# Patient Record
Sex: MALE | Race: BLACK OR AFRICAN AMERICAN | NOT HISPANIC OR LATINO | Employment: OTHER | URBAN - METROPOLITAN AREA
[De-identification: names, ages, dates, MRNs, and addresses within clinical notes are randomized per-mention and may not be internally consistent; named-entity substitution may affect disease eponyms.]

---

## 2020-06-16 ENCOUNTER — HOSPITAL ENCOUNTER (OUTPATIENT)
Dept: URGENT CARE | Facility: CLINIC | Age: 85
Discharge: HOME | End: 2020-06-16

## 2022-05-09 ENCOUNTER — TELEPHONE (OUTPATIENT)
Dept: UROLOGY | Facility: CLINIC | Age: 87
End: 2022-05-09

## 2022-05-09 NOTE — TELEPHONE ENCOUNTER
Provider: DR. MOSHER  Caller: MADI BASURTO  Relationship to Patient: CAREGIVER  Reason for Call:   PT HAD TO RESCHEDULE APPT FOR 5/9/22 AT 1:00 W/ DR. MOSHER. MADI BASURTO CALLED TO RESCHEDULE AND IS REQUESTING THAT PT STILL SEE DR. MOSHER. RESCHEDULED APPT FOR 5/18/22 AT 1:00 W/ DR. MOSHER.

## 2022-05-13 ENCOUNTER — APPOINTMENT (OUTPATIENT)
Dept: GENERAL RADIOLOGY | Facility: HOSPITAL | Age: 87
End: 2022-05-13

## 2022-05-13 ENCOUNTER — HOSPITAL ENCOUNTER (INPATIENT)
Facility: HOSPITAL | Age: 87
LOS: 3 days | Discharge: NURSING, REHAB OR LONG-TERM CARE | End: 2022-05-17
Attending: EMERGENCY MEDICINE

## 2022-05-13 DIAGNOSIS — R26.2 DIFFICULTY WALKING: ICD-10-CM

## 2022-05-13 DIAGNOSIS — K59.00 CONSTIPATION, UNSPECIFIED CONSTIPATION TYPE: ICD-10-CM

## 2022-05-13 DIAGNOSIS — N19 UREMIA: ICD-10-CM

## 2022-05-13 DIAGNOSIS — N17.9 ACUTE KIDNEY INJURY: Primary | ICD-10-CM

## 2022-05-13 DIAGNOSIS — Z78.9 DECREASED ACTIVITIES OF DAILY LIVING (ADL): ICD-10-CM

## 2022-05-13 DIAGNOSIS — R77.8 ELEVATED TROPONIN: ICD-10-CM

## 2022-05-13 DIAGNOSIS — R33.8 ACUTE URINARY RETENTION: ICD-10-CM

## 2022-05-13 LAB
ALBUMIN SERPL-MCNC: 4.4 G/DL (ref 3.5–5.2)
ALBUMIN/GLOB SERPL: 1 G/DL
ALP SERPL-CCNC: 89 U/L (ref 39–117)
ALT SERPL W P-5'-P-CCNC: 13 U/L (ref 1–41)
ANION GAP SERPL CALCULATED.3IONS-SCNC: 18.9 MMOL/L (ref 5–15)
AST SERPL-CCNC: 17 U/L (ref 1–40)
BASOPHILS # BLD AUTO: 0.02 10*3/MM3 (ref 0–0.2)
BASOPHILS NFR BLD AUTO: 0.3 % (ref 0–1.5)
BILIRUB SERPL-MCNC: 1.1 MG/DL (ref 0–1.2)
BILIRUB UR QL STRIP: NEGATIVE
BUN SERPL-MCNC: 112 MG/DL (ref 8–23)
BUN/CREAT SERPL: 15.5 (ref 7–25)
CALCIUM SPEC-SCNC: 9.5 MG/DL (ref 8.6–10.5)
CHLORIDE SERPL-SCNC: 98 MMOL/L (ref 98–107)
CLARITY UR: CLEAR
CO2 SERPL-SCNC: 19.1 MMOL/L (ref 22–29)
COLOR UR: YELLOW
CREAT SERPL-MCNC: 7.22 MG/DL (ref 0.76–1.27)
DEPRECATED RDW RBC AUTO: 48 FL (ref 37–54)
EGFRCR SERPLBLD CKD-EPI 2021: 6.8 ML/MIN/1.73
EOSINOPHIL # BLD AUTO: 0.07 10*3/MM3 (ref 0–0.4)
EOSINOPHIL NFR BLD AUTO: 1 % (ref 0.3–6.2)
ERYTHROCYTE [DISTWIDTH] IN BLOOD BY AUTOMATED COUNT: 14.3 % (ref 12.3–15.4)
GLOBULIN UR ELPH-MCNC: 4.3 GM/DL
GLUCOSE SERPL-MCNC: 110 MG/DL (ref 65–99)
GLUCOSE UR STRIP-MCNC: NEGATIVE MG/DL
HCT VFR BLD AUTO: 32.2 % (ref 37.5–51)
HGB BLD-MCNC: 10.9 G/DL (ref 13–17.7)
HGB UR QL STRIP.AUTO: NEGATIVE
HOLD SPECIMEN: NORMAL
HOLD SPECIMEN: NORMAL
IMM GRANULOCYTES # BLD AUTO: 0.03 10*3/MM3 (ref 0–0.05)
IMM GRANULOCYTES NFR BLD AUTO: 0.4 % (ref 0–0.5)
KETONES UR QL STRIP: NEGATIVE
LEUKOCYTE ESTERASE UR QL STRIP.AUTO: NEGATIVE
LIPASE SERPL-CCNC: 75 U/L (ref 13–60)
LYMPHOCYTES # BLD AUTO: 1.45 10*3/MM3 (ref 0.7–3.1)
LYMPHOCYTES NFR BLD AUTO: 21.6 % (ref 19.6–45.3)
MCH RBC QN AUTO: 31.1 PG (ref 26.6–33)
MCHC RBC AUTO-ENTMCNC: 33.9 G/DL (ref 31.5–35.7)
MCV RBC AUTO: 91.7 FL (ref 79–97)
MONOCYTES # BLD AUTO: 1.16 10*3/MM3 (ref 0.1–0.9)
MONOCYTES NFR BLD AUTO: 17.3 % (ref 5–12)
NEUTROPHILS NFR BLD AUTO: 3.97 10*3/MM3 (ref 1.7–7)
NEUTROPHILS NFR BLD AUTO: 59.4 % (ref 42.7–76)
NITRITE UR QL STRIP: NEGATIVE
NRBC BLD AUTO-RTO: 0 /100 WBC (ref 0–0.2)
PH UR STRIP.AUTO: 5.5 [PH] (ref 5–8)
PLATELET # BLD AUTO: 167 10*3/MM3 (ref 140–450)
PMV BLD AUTO: 8.6 FL (ref 6–12)
POTASSIUM SERPL-SCNC: 4.2 MMOL/L (ref 3.5–5.2)
PROT SERPL-MCNC: 8.7 G/DL (ref 6–8.5)
PROT UR QL STRIP: NEGATIVE
RBC # BLD AUTO: 3.51 10*6/MM3 (ref 4.14–5.8)
SODIUM SERPL-SCNC: 136 MMOL/L (ref 136–145)
SP GR UR STRIP: 1.01 (ref 1–1.03)
TROPONIN T SERPL-MCNC: 0.03 NG/ML (ref 0–0.03)
UROBILINOGEN UR QL STRIP: NORMAL
WBC NRBC COR # BLD: 6.7 10*3/MM3 (ref 3.4–10.8)
WHOLE BLOOD HOLD COAG: NORMAL
WHOLE BLOOD HOLD SPECIMEN: NORMAL

## 2022-05-13 PROCEDURE — 93005 ELECTROCARDIOGRAM TRACING: CPT

## 2022-05-13 PROCEDURE — 85025 COMPLETE CBC W/AUTO DIFF WBC: CPT

## 2022-05-13 PROCEDURE — 51702 INSERT TEMP BLADDER CATH: CPT

## 2022-05-13 PROCEDURE — 80053 COMPREHEN METABOLIC PANEL: CPT

## 2022-05-13 PROCEDURE — 74022 RADEX COMPL AQT ABD SERIES: CPT

## 2022-05-13 PROCEDURE — 84484 ASSAY OF TROPONIN QUANT: CPT

## 2022-05-13 PROCEDURE — 81003 URINALYSIS AUTO W/O SCOPE: CPT | Performed by: EMERGENCY MEDICINE

## 2022-05-13 PROCEDURE — 93010 ELECTROCARDIOGRAM REPORT: CPT | Performed by: INTERNAL MEDICINE

## 2022-05-13 PROCEDURE — 83690 ASSAY OF LIPASE: CPT

## 2022-05-13 PROCEDURE — 99285 EMERGENCY DEPT VISIT HI MDM: CPT

## 2022-05-13 PROCEDURE — 93005 ELECTROCARDIOGRAM TRACING: CPT | Performed by: EMERGENCY MEDICINE

## 2022-05-13 PROCEDURE — 36415 COLL VENOUS BLD VENIPUNCTURE: CPT

## 2022-05-13 RX ORDER — FUROSEMIDE 40 MG/1
40 TABLET ORAL DAILY
Status: ON HOLD | COMMUNITY
End: 2022-05-15

## 2022-05-13 RX ORDER — NITROGLYCERIN 80 MG/1
0.4 PATCH TRANSDERMAL SEE ADMIN INSTRUCTIONS
Status: ON HOLD | COMMUNITY
End: 2022-05-15

## 2022-05-13 RX ORDER — METOPROLOL SUCCINATE 50 MG/1
25 TABLET, EXTENDED RELEASE ORAL DAILY
COMMUNITY
Start: 2022-05-03

## 2022-05-13 RX ORDER — LISINOPRIL 10 MG/1
5 TABLET ORAL EVERY 24 HOURS
Status: ON HOLD | COMMUNITY
End: 2022-05-15

## 2022-05-13 RX ORDER — GABAPENTIN 100 MG/1
100 CAPSULE ORAL EVERY 8 HOURS SCHEDULED
Status: ON HOLD | COMMUNITY
End: 2022-05-15

## 2022-05-13 RX ORDER — FEXOFENADINE HCL 180 MG/1
180 TABLET ORAL DAILY
COMMUNITY
Start: 2022-05-03

## 2022-05-13 RX ORDER — SODIUM CHLORIDE 0.9 % (FLUSH) 0.9 %
10 SYRINGE (ML) INJECTION AS NEEDED
Status: DISCONTINUED | OUTPATIENT
Start: 2022-05-13 | End: 2022-05-17 | Stop reason: HOSPADM

## 2022-05-13 RX ORDER — FEXOFENADINE HCL 180 MG/1
180 TABLET ORAL EVERY 24 HOURS
COMMUNITY
End: 2022-05-13

## 2022-05-13 RX ORDER — ESOMEPRAZOLE MAGNESIUM 40 MG/1
40 CAPSULE, DELAYED RELEASE ORAL DAILY
Status: ON HOLD | COMMUNITY
End: 2022-05-15

## 2022-05-13 RX ORDER — CLOPIDOGREL BISULFATE 75 MG/1
75 TABLET ORAL EVERY 24 HOURS
COMMUNITY

## 2022-05-14 PROBLEM — N17.9 ACUTE KIDNEY INJURY: Status: ACTIVE | Noted: 2022-05-14

## 2022-05-14 LAB
ALBUMIN SERPL-MCNC: 3.7 G/DL (ref 3.5–5.2)
ALBUMIN/GLOB SERPL: 0.9 G/DL
ALP SERPL-CCNC: 78 U/L (ref 39–117)
ALT SERPL W P-5'-P-CCNC: 10 U/L (ref 1–41)
ANION GAP SERPL CALCULATED.3IONS-SCNC: 15.8 MMOL/L (ref 5–15)
APTT PPP: 37.3 SECONDS (ref 24.2–34.2)
AST SERPL-CCNC: 17 U/L (ref 1–40)
BASOPHILS # BLD AUTO: 0.03 10*3/MM3 (ref 0–0.2)
BASOPHILS NFR BLD AUTO: 0.5 % (ref 0–1.5)
BILIRUB SERPL-MCNC: 1 MG/DL (ref 0–1.2)
BUN SERPL-MCNC: 95 MG/DL (ref 8–23)
BUN/CREAT SERPL: 17.4 (ref 7–25)
CALCIUM SPEC-SCNC: 8.9 MG/DL (ref 8.6–10.5)
CHLORIDE SERPL-SCNC: 107 MMOL/L (ref 98–107)
CO2 SERPL-SCNC: 18.2 MMOL/L (ref 22–29)
CREAT SERPL-MCNC: 5.45 MG/DL (ref 0.76–1.27)
DEPRECATED RDW RBC AUTO: 47.6 FL (ref 37–54)
EGFRCR SERPLBLD CKD-EPI 2021: 9.6 ML/MIN/1.73
EOSINOPHIL # BLD AUTO: 0.09 10*3/MM3 (ref 0–0.4)
EOSINOPHIL NFR BLD AUTO: 1.6 % (ref 0.3–6.2)
ERYTHROCYTE [DISTWIDTH] IN BLOOD BY AUTOMATED COUNT: 14.1 % (ref 12.3–15.4)
GLOBULIN UR ELPH-MCNC: 3.9 GM/DL
GLUCOSE SERPL-MCNC: 94 MG/DL (ref 65–99)
HCT VFR BLD AUTO: 32.6 % (ref 37.5–51)
HGB BLD-MCNC: 10.8 G/DL (ref 13–17.7)
IMM GRANULOCYTES # BLD AUTO: 0.02 10*3/MM3 (ref 0–0.05)
IMM GRANULOCYTES NFR BLD AUTO: 0.4 % (ref 0–0.5)
INR PPP: 1.22 (ref 0.86–1.15)
LYMPHOCYTES # BLD AUTO: 1.21 10*3/MM3 (ref 0.7–3.1)
LYMPHOCYTES NFR BLD AUTO: 21.8 % (ref 19.6–45.3)
MAGNESIUM SERPL-MCNC: 2.5 MG/DL (ref 1.6–2.4)
MCH RBC QN AUTO: 30.9 PG (ref 26.6–33)
MCHC RBC AUTO-ENTMCNC: 33.1 G/DL (ref 31.5–35.7)
MCV RBC AUTO: 93.1 FL (ref 79–97)
MONOCYTES # BLD AUTO: 0.79 10*3/MM3 (ref 0.1–0.9)
MONOCYTES NFR BLD AUTO: 14.2 % (ref 5–12)
NEUTROPHILS NFR BLD AUTO: 3.42 10*3/MM3 (ref 1.7–7)
NEUTROPHILS NFR BLD AUTO: 61.5 % (ref 42.7–76)
NRBC BLD AUTO-RTO: 0 /100 WBC (ref 0–0.2)
PLATELET # BLD AUTO: 162 10*3/MM3 (ref 140–450)
PMV BLD AUTO: 8.6 FL (ref 6–12)
POTASSIUM SERPL-SCNC: 3.9 MMOL/L (ref 3.5–5.2)
PROT SERPL-MCNC: 7.6 G/DL (ref 6–8.5)
PROTHROMBIN TIME: 15.6 SECONDS (ref 11.8–14.9)
RBC # BLD AUTO: 3.5 10*6/MM3 (ref 4.14–5.8)
SODIUM SERPL-SCNC: 141 MMOL/L (ref 136–145)
WBC NRBC COR # BLD: 5.56 10*3/MM3 (ref 3.4–10.8)

## 2022-05-14 PROCEDURE — 94799 UNLISTED PULMONARY SVC/PX: CPT

## 2022-05-14 PROCEDURE — 93010 ELECTROCARDIOGRAM REPORT: CPT | Performed by: INTERNAL MEDICINE

## 2022-05-14 PROCEDURE — 83735 ASSAY OF MAGNESIUM: CPT | Performed by: EMERGENCY MEDICINE

## 2022-05-14 PROCEDURE — 85730 THROMBOPLASTIN TIME PARTIAL: CPT | Performed by: EMERGENCY MEDICINE

## 2022-05-14 PROCEDURE — 80053 COMPREHEN METABOLIC PANEL: CPT | Performed by: INTERNAL MEDICINE

## 2022-05-14 PROCEDURE — 85025 COMPLETE CBC W/AUTO DIFF WBC: CPT | Performed by: INTERNAL MEDICINE

## 2022-05-14 PROCEDURE — 84153 ASSAY OF PSA TOTAL: CPT | Performed by: INTERNAL MEDICINE

## 2022-05-14 PROCEDURE — 93005 ELECTROCARDIOGRAM TRACING: CPT | Performed by: INTERNAL MEDICINE

## 2022-05-14 PROCEDURE — 85610 PROTHROMBIN TIME: CPT | Performed by: EMERGENCY MEDICINE

## 2022-05-14 RX ORDER — ACETAMINOPHEN 325 MG/1
650 TABLET ORAL EVERY 4 HOURS PRN
Status: DISCONTINUED | OUTPATIENT
Start: 2022-05-14 | End: 2022-05-17 | Stop reason: HOSPADM

## 2022-05-14 RX ORDER — ASPIRIN 81 MG/1
81 TABLET, CHEWABLE ORAL DAILY
Status: DISCONTINUED | OUTPATIENT
Start: 2022-05-14 | End: 2022-05-17 | Stop reason: HOSPADM

## 2022-05-14 RX ORDER — SODIUM CHLORIDE 0.9 % (FLUSH) 0.9 %
10 SYRINGE (ML) INJECTION EVERY 12 HOURS SCHEDULED
Status: DISCONTINUED | OUTPATIENT
Start: 2022-05-14 | End: 2022-05-17 | Stop reason: HOSPADM

## 2022-05-14 RX ORDER — SODIUM CHLORIDE 9 MG/ML
100 INJECTION, SOLUTION INTRAVENOUS CONTINUOUS
Status: DISCONTINUED | OUTPATIENT
Start: 2022-05-14 | End: 2022-05-14

## 2022-05-14 RX ORDER — SODIUM CHLORIDE 9 MG/ML
125 INJECTION, SOLUTION INTRAVENOUS CONTINUOUS
Status: DISCONTINUED | OUTPATIENT
Start: 2022-05-14 | End: 2022-05-17

## 2022-05-14 RX ORDER — NITROGLYCERIN 80 MG/1
1 PATCH TRANSDERMAL DAILY
Status: DISCONTINUED | OUTPATIENT
Start: 2022-05-14 | End: 2022-05-17 | Stop reason: HOSPADM

## 2022-05-14 RX ORDER — CLOPIDOGREL BISULFATE 75 MG/1
75 TABLET ORAL EVERY 24 HOURS
Status: DISCONTINUED | OUTPATIENT
Start: 2022-05-14 | End: 2022-05-17 | Stop reason: HOSPADM

## 2022-05-14 RX ORDER — METOPROLOL SUCCINATE 50 MG/1
50 TABLET, EXTENDED RELEASE ORAL DAILY
Status: DISCONTINUED | OUTPATIENT
Start: 2022-05-14 | End: 2022-05-17 | Stop reason: HOSPADM

## 2022-05-14 RX ORDER — NITROGLYCERIN 0.4 MG/1
0.4 TABLET SUBLINGUAL
Status: DISCONTINUED | OUTPATIENT
Start: 2022-05-14 | End: 2022-05-17 | Stop reason: HOSPADM

## 2022-05-14 RX ORDER — GABAPENTIN 100 MG/1
100 CAPSULE ORAL EVERY 8 HOURS SCHEDULED
Status: DISCONTINUED | OUTPATIENT
Start: 2022-05-14 | End: 2022-05-17 | Stop reason: HOSPADM

## 2022-05-14 RX ORDER — SODIUM CHLORIDE 0.9 % (FLUSH) 0.9 %
10 SYRINGE (ML) INJECTION AS NEEDED
Status: DISCONTINUED | OUTPATIENT
Start: 2022-05-14 | End: 2022-05-17 | Stop reason: HOSPADM

## 2022-05-14 RX ORDER — LISINOPRIL 5 MG/1
5 TABLET ORAL EVERY 24 HOURS
Status: DISCONTINUED | OUTPATIENT
Start: 2022-05-14 | End: 2022-05-17 | Stop reason: HOSPADM

## 2022-05-14 RX ORDER — PANTOPRAZOLE SODIUM 40 MG/1
40 TABLET, DELAYED RELEASE ORAL EVERY MORNING
Status: DISCONTINUED | OUTPATIENT
Start: 2022-05-14 | End: 2022-05-17 | Stop reason: HOSPADM

## 2022-05-14 RX ORDER — CETIRIZINE HYDROCHLORIDE 10 MG/1
10 TABLET ORAL DAILY
Status: DISCONTINUED | OUTPATIENT
Start: 2022-05-14 | End: 2022-05-17 | Stop reason: HOSPADM

## 2022-05-14 RX ADMIN — Medication 10 ML: at 21:13

## 2022-05-14 RX ADMIN — SODIUM CHLORIDE 500 ML: 9 INJECTION, SOLUTION INTRAVENOUS at 00:46

## 2022-05-14 RX ADMIN — SODIUM CHLORIDE 125 ML/HR: 9 INJECTION, SOLUTION INTRAVENOUS at 09:18

## 2022-05-14 RX ADMIN — METOPROLOL SUCCINATE 50 MG: 50 TABLET, EXTENDED RELEASE ORAL at 11:24

## 2022-05-14 RX ADMIN — GABAPENTIN 100 MG: 100 CAPSULE ORAL at 14:08

## 2022-05-14 RX ADMIN — SODIUM CHLORIDE 125 ML/HR: 9 INJECTION, SOLUTION INTRAVENOUS at 01:38

## 2022-05-14 RX ADMIN — CLOPIDOGREL BISULFATE 75 MG: 75 TABLET ORAL at 11:24

## 2022-05-14 RX ADMIN — CETIRIZINE HYDROCHLORIDE 10 MG: 10 TABLET, FILM COATED ORAL at 11:24

## 2022-05-14 RX ADMIN — Medication 10 ML: at 11:26

## 2022-05-14 RX ADMIN — ASPIRIN 81 MG CHEWABLE TABLET 81 MG: 81 TABLET CHEWABLE at 11:24

## 2022-05-14 RX ADMIN — LISINOPRIL 5 MG: 5 TABLET ORAL at 11:24

## 2022-05-14 RX ADMIN — PANTOPRAZOLE SODIUM 40 MG: 40 TABLET, DELAYED RELEASE ORAL at 11:24

## 2022-05-14 RX ADMIN — GABAPENTIN 100 MG: 100 CAPSULE ORAL at 21:13

## 2022-05-14 NOTE — H&P
[facility]   HISTORY AND PHYSICAL    Patient Name: Selvin Ferguson  : 1935  MRN: [id number]  Primary Care Physician:  Provider, No Known  Date of admission: 2022    Subjective   Subjective     Chief Complaint: Acute kidney injury    HPI:    Selvin Ferguson is a 86 y.o. male presented to emergency room with abdomen pain and swelling.  He has decreased appetite and decreased intake for 7 days and decreased urine output and decrease in bowel movements.     Review of Systems   Review of Systems   Constitutional: Positive for fatigue. Activity change: in bed.   HENT: Negative.    Eyes: Negative.    Respiratory: Negative for shortness of breath.    Cardiovascular: Positive for leg swelling.   Gastrointestinal: Positive for abdominal distention and abdominal pain.   Endocrine: Negative.    Genitourinary: Positive for decreased urine volume.   Musculoskeletal: Negative.    Skin: Negative.    Allergic/Immunologic: Negative.    Neurological: Negative.    Hematological: Negative.    Psychiatric/Behavioral: Negative.        Personal History     Past Medical History:   Diagnosis Date   • Dementia (HCC)    • Diabetes mellitus (HCC)    • Hyperlipidemia    • Hypertension    • Renal disorder        History reviewed. No pertinent surgical history.    Family History: family history is not on file. Otherwise pertinent FHx was reviewed and not pertinent to current issue.    Social History:  reports that he has never smoked. He has never used smokeless tobacco. He reports previous alcohol use. He reports that he does not use drugs.    Home Medications:  Aspirin, clopidogrel, esomeprazole, fexofenadine, furosemide, gabapentin, lisinopril, metoprolol succinate XL, and nitroglycerin      Allergies:  No Known Allergies    Objective   Objective     Vitals:   Temp:  [98 °F (36.7 °C)-98.6 °F (37 °C)] 98.1 °F (36.7 °C)  Heart Rate:  [78-85] 78  Resp:  [16-18] 16  BP: (134-148)/(54-78) 148/78  Physical  Exam    Constitutional: Awake, alert   Eyes: PERRLA, sclerae anicteric, no conjunctival injection   HENT: NCAT, mucous membranes moist   Neck: Supple, no thyromegaly, no lymphadenopathy, trachea midline   Respiratory: Clear to auscultation bilaterally, nonlabored respirations    Cardiovascular: RRR, no murmurs, rubs, or gallops, palpable pedal pulses bilaterally   Gastrointestinal: Positive bowel sounds, soft, nontender, nondistended   Musculoskeletal: No bilateral ankle edema, no clubbing or cyanosis to extremities   Psychiatric: Appropriate affect, cooperative   Neurologic: Oriented x 3, strength symmetric in all extremities, Cranial Nerves grossly intact to confrontation, speech clear   Skin: No rashes     Result Review    Result Review:  I have personally reviewed the results from the time of this admission to 5/14/2022 01:21 EDT and agree with these findings:  [x]  Laboratory  []  Microbiology  []  Radiology  []  EKG/Telemetry   []  Cardiology/Vascular   []  Pathology  []  Old records  []  Other:  Most notable findings include: Very elevated BUN/creatinine    Assessment & Plan   Assessment / Plan     Brief Patient Summary:  Selvin Ferguson is a 86 y.o. male who had abdominal x-ray showing large amount of stool burden    Active Hospital Problems:  Active Hospital Problems    Diagnosis    • Acute kidney injury (HCC)      Plan:   BUN/creatinine very high   Abdomen x-ray showing large stool burden  Admitted to monitored floor  Started on IV fluids   DVT prophylaxis:  No DVT prophylaxis order currently exists.    CODE STATUS:    Level Of Support Discussed With: Patient  Code Status (Patient has no pulse and is not breathing): CPR (Attempt to Resuscitate)  Medical Interventions (Patient has pulse or is breathing): Full Support    Admission Status:  I believe this patient meets in patient status.    Electronically signed by Ynes Kc MD, 05/14/22, 1:21 AM EDT.

## 2022-05-14 NOTE — PROGRESS NOTES
[facility]     Progress Note    Patient Name: Selvin Ferguson  : 1935  MRN: [id number]  Primary Care Physician:  Provider, No Known  Date of admission: 2022    Subjective   Subjective     Chief Complaint: CARMEL, constipation    HPI:  Patient Reports he feels like he had have a bowel movement now.  Agustin is putting out urine    Review of Systems   Review of Systems   Constitutional: Positive for fatigue. Activity change: in bed.   HENT: Negative.    Eyes: Negative.    Respiratory: Negative.    Cardiovascular: Negative.    Gastrointestinal: Positive for abdominal distention.   Endocrine: Negative.    Genitourinary: Negative.    Musculoskeletal: Negative.    Skin: Negative.    Allergic/Immunologic: Negative.    Neurological: Positive for weakness.   Hematological: Negative.    Psychiatric/Behavioral: Negative.        Objective   Objective     Vitals:   Temp:  [97.7 °F (36.5 °C)-98.6 °F (37 °C)] 97.7 °F (36.5 °C)  Heart Rate:  [73-90] 90  Resp:  [16-18] 18  BP: (117-148)/(54-78) 117/55  Physical Exam    Constitutional: Awake, alert   Eyes: PERRLA, sclerae anicteric, no conjunctival injection   HENT: NCAT, mucous membranes moist   Neck: Supple, no thyromegaly, no lymphadenopathy, trachea midline   Respiratory: Clear to auscultation bilaterally, nonlabored respirations    Cardiovascular: RRR, no murmurs, rubs, or gallops, palpable pedal pulses bilaterally   Gastrointestinal: Positive bowel sounds, soft, nontender, nondistended   Musculoskeletal: No bilateral ankle edema, no clubbing or cyanosis to extremities   Psychiatric: Appropriate affect, cooperative   Neurologic: Oriented x 3, strength symmetric in all extremities, Cranial Nerves grossly intact to confrontation, speech clear   Skin: No rashes     Result Review    Result Review:  I have personally reviewed the results from the time of this admission to 2022 11:58 EDT and agree with these findings:  [x]  Laboratory  []  Microbiology  []   Radiology  []  EKG/Telemetry   []  Cardiology/Vascular   []  Pathology  []  Old records  []  Other:  Most notable findings include: BUN/creatinine improving with IV fluids  Assessment & Plan   Assessment / Plan     Brief Patient Summary:  Selvin Ferguson is a 86 y.o. male who has not been eating or drinking much and came in severely dehydrated    Active Hospital Problems:  Active Hospital Problems    Diagnosis    • Acute kidney injury (HCC)      Plan:   Continue IV fluids labs in a.m.    DVT prophylaxis:  No DVT prophylaxis order currently exists.    CODE STATUS:   Level Of Support Discussed With: Patient  Code Status (Patient has no pulse and is not breathing): CPR (Attempt to Resuscitate)  Medical Interventions (Patient has pulse or is breathing): Full Support      Electronically signed by Ynes Kc MD, 05/14/22, 11:58 AM EDT.

## 2022-05-14 NOTE — ED PROVIDER NOTES
Time: 10:39 PM EDT  Arrived by: private car, escorted by significant other  Chief Complaint: ABDOMINAL PAIN  History provided by: Pt and Significant Other  History is limited by: N/A     History of Present Illness:  Patient is a 86 y.o. year old male that presents to the emergency department with ABDOMINAL PAIN and swelling in his feet and legs. His significant other reports that the pt has had severely decreased fluid intake and decreased appetite for the past 8 days, as well as decreased urine output and a decrease in bowel movements. The pt complains that his abdominal pain is moderate but constant and that his stomach is distended. His significant other states that the abdominal pain has increased gradually as has the abdominal distension since his symptoms initially started.   He also complains of swelling in his legs and feet that has been gradually worsening. Pt has a rasping cough that has been constant for the past 2 weeks but denies any SOB or emesis. He does not report having done anything that improved or worsened his symptoms, apart from his abdominal pain worsening with movement.     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     Pt denies any hx of cancer or a blood disorder.    Pt is not on dialysis currently.    HPI    Similar Symptoms Previously: Pt reports that his cough has been present for 2 weeks and his significant other claims that he has been exhibiting decreased fluid intake and appetite as well as decreased urine output for the past 8 days.   Recently seen: Not recently seen in this ED      Patient Care Team  Primary Care Provider: Kayla Renee APRN    Past Medical History:     No Known Allergies  Past Medical History:   Diagnosis Date   • Dementia (HCC)    • Diabetes mellitus (HCC)    • Hyperlipidemia    • Hypertension    • Renal disorder      History reviewed. No pertinent surgical history.  History reviewed. No pertinent family history.    Home  "Medications:  Prior to Admission medications    Not on File        Social History:   Social History     Tobacco Use   • Smoking status: Never Smoker   • Smokeless tobacco: Never Used   Substance Use Topics   • Alcohol use: Not Currently   • Drug use: Never         Review of Systems:  Review of Systems   Constitutional: Positive for appetite change. Negative for chills, diaphoresis and fever.   HENT: Negative for congestion, postnasal drip, rhinorrhea and sore throat.    Eyes: Negative for photophobia.   Respiratory: Positive for cough (cough started 2 weeks ago). Negative for chest tightness and shortness of breath.    Cardiovascular: Positive for leg swelling. Negative for chest pain and palpitations.   Gastrointestinal: Positive for abdominal distention, abdominal pain and constipation. Negative for diarrhea, nausea and vomiting.   Genitourinary: Positive for decreased urine volume and difficulty urinating. Negative for dysuria, hematuria and urgency.   Musculoskeletal: Negative for neck pain and neck stiffness.   Skin: Negative for pallor and rash.   Neurological: Negative for dizziness, syncope, weakness, numbness and headaches.   Hematological: Negative for adenopathy. Does not bruise/bleed easily.   Psychiatric/Behavioral: Negative.         Physical Exam:  /48 (BP Location: Left arm, Patient Position: Lying)   Pulse 76   Temp 98.6 °F (37 °C) (Oral)   Resp 18   Ht 180.3 cm (70.98\")   Wt 80.6 kg (177 lb 11.1 oz)   SpO2 100%   BMI 24.79 kg/m²     Physical Exam  Vitals and nursing note reviewed.   Constitutional:       General: He is not in acute distress.     Appearance: Normal appearance. He is not ill-appearing, toxic-appearing or diaphoretic.   HENT:      Head: Normocephalic and atraumatic.      Mouth/Throat:      Mouth: Mucous membranes are moist.   Eyes:      Pupils: Pupils are equal, round, and reactive to light.   Cardiovascular:      Rate and Rhythm: Normal rate and regular rhythm.      " Pulses: Normal pulses.           Carotid pulses are 2+ on the right side and 2+ on the left side.       Radial pulses are 2+ on the right side and 2+ on the left side.        Femoral pulses are 2+ on the right side and 2+ on the left side.       Popliteal pulses are 2+ on the right side and 2+ on the left side.        Dorsalis pedis pulses are 2+ on the right side and 2+ on the left side.        Posterior tibial pulses are 2+ on the right side and 2+ on the left side.      Heart sounds: Normal heart sounds. No murmur heard.  Pulmonary:      Effort: Pulmonary effort is normal. No accessory muscle usage, respiratory distress or retractions.      Breath sounds: Examination of the right-lower field reveals rales. Examination of the left-lower field reveals rales. Decreased breath sounds and rales present. No wheezing or rhonchi.   Chest:      Comments: No prominent Aortic Pulsation  Abdominal:      General: There is distension.      Palpations: Abdomen is soft. There is no mass.      Tenderness: There is no abdominal tenderness. There is no right CVA tenderness, left CVA tenderness, guarding or rebound.      Comments: No rigidity   Musculoskeletal:         General: No swelling, tenderness or deformity.      Cervical back: Neck supple. No tenderness.      Right lower leg: Edema (feet, ankles, lower legs) present.      Left lower leg: Edema (feet, ankles, lower legs) present.   Skin:     General: Skin is warm and dry.      Capillary Refill: Capillary refill takes less than 2 seconds.      Coloration: Skin is not jaundiced or pale.      Findings: No erythema.      Comments: Pt has a thoracotomy scar mid chest   Neurological:      General: No focal deficit present.      Mental Status: He is alert and oriented to person, place, and time. Mental status is at baseline.      Sensory: No sensory deficit.      Motor: No weakness.   Psychiatric:         Mood and Affect: Mood normal.         Behavior: Behavior normal.                 Medications in the Emergency Department:  Medications   sodium chloride 0.9 % bolus 500 mL (0 mL Intravenous Stopped 5/14/22 0133)        Labs  Lab Results (last 24 hours)     Procedure Component Value Units Date/Time    Basic Metabolic Panel [[id number]]  (Abnormal) Collected: 05/19/22 0400    Specimen: Blood Updated: 05/19/22 0502     Glucose 149 mg/dL      BUN 22 mg/dL      Creatinine 2.15 mg/dL      Sodium 145 mmol/L      Potassium 3.3 mmol/L      Chloride 110 mmol/L      CO2 23.8 mmol/L      Calcium 8.4 mg/dL      BUN/Creatinine Ratio 10.2     Anion Gap 11.2 mmol/L      eGFR 29.3 mL/min/1.73      Comment: National Kidney Foundation and American Society of Nephrology (ASN) Task Force recommended calculation based on the Chronic Kidney Disease Epidemiology Collaboration (CKD-EPI) equation refit without adjustment for race.       Narrative:      GFR Normal >60  Chronic Kidney Disease <60  Kidney Failure <15      Magnesium [[id number]]  (Normal) Collected: 05/19/22 0400    Specimen: Blood Updated: 05/19/22 0516     Magnesium 1.7 mg/dL            Imaging:  No Radiology Exams Resulted Within Past 24 Hours    Procedures:  Procedures    Progress  ED Course as of 05/20/22 0150   Fri May 13, 2022   2303 EKG:    Rhythm: Sinus rhythm  Rate: 84  LVH by criteria in aVL  Intervals: Normal SC and QT interval  T-wave: Nonspecific isolated T wave inversion III, nonspecific T wave inversion in aVF  ST Segment: J-point elevation in nonspecific ST segment in V2, V3, no pathological ST elevation or ST depression    EKG Comparison: The QRS and ST morphology are probably unchanged from EKG performed June 16,020    Interpreted by me   [SD]   2348 Agustin catheter was placed and approximately 2 L was achieved from the bladder [SD]      ED Course User Index  [SD] Piyush Luis DO                            Medical Decision Making:  MDM  Number of Diagnoses or Management Options  Acute kidney injury (HCC)  Elevated  troponin  Uremia  Diagnosis management comments:     A Agustin catheter was placed.  2 L of urine was obtained from the bladder.  The patient's abdominal distention was significantly improved.  The abdomen was reexamined.  The patient had no abdominal tenderness.  X-ray does also show mistreat possible constipation.  The patient will be given a soapsuds enema.       Amount and/or Complexity of Data Reviewed  Clinical lab tests: reviewed  Tests in the radiology section of CPT®: reviewed  Tests in the medicine section of CPT®: reviewed  Decide to obtain previous medical records or to obtain history from someone other than the patient: yes  Discuss the patient with other providers: yes (Discussed case with Dr. Mcmahon.  She will admit the patient to the hospital due to the acute kidney injury and acute urinary retention.  The patient will undergo further evaluation acute kidney injury and urinary retention upon admission)                 Final diagnoses:   Acute kidney injury (HCC)   Uremia   Elevated troponin   Constipation, unspecified constipation type   Acute urinary retention        Disposition:  ED Disposition     ED Disposition   Decision to Admit    Condition   --    Comment   Level of Care: Telemetry [5]   Diagnosis: Acute kidney injury (HCC) [[id number]]   Admitting Physician: BRODY MCMAHON [[id number]]   Attending Physician: BRODY MCMAHON [[id number]]   Isolate for COVID?: No [0]   Certification: I Certify That Inpatient Hospital Services Are Medically Necessary For Greater Than 2 Midnights               Documentation assistance provided by Lanette Bardales acting as scribe for Piyush Luis DO. Information recorded by the scribe was done at my direction and has been verified and validated by me.               Lanette Bardales  05/13/22 2963       Piyush Luis DO  05/20/22 0151

## 2022-05-14 NOTE — CONSULTS
"Nutrition Services    Patient Name: Selvin Ferguson  YOB: 1935  MRN: [id number]  Admission date: 5/13/2022      CLINICAL NUTRITION ASSESSMENT      Reason for Assessment  MST score 2+     H&P:    Past Medical History:   Diagnosis Date   • Dementia (HCC)    • Diabetes mellitus (HCC)    • Hyperlipidemia    • Hypertension    • Renal disorder         Current Problems:   Active Hospital Problems    Diagnosis    • Acute kidney injury (HCC)         Nutrition/Diet History         Narrative     RD following patient for unsure of weight loss at time of admission. Patient is currently drowsy/not oriented. Patient is currently NPO. No previous weight history on file.        Anthropometrics        Current Height, Weight Height: 180.3 cm (70.98\")  Weight: 88.8 kg (195 lb 12.3 oz)   Current BMI Body mass index is 27.32 kg/m².       Weight Hx  Wt Readings from Last 30 Encounters:   05/14/22 0209 88.8 kg (195 lb 12.3 oz)   05/13/22 2303 88.8 kg (195 lb 12.3 oz)            Wt Change Observation No previous weight hx     Estimated/Assessed Needs       Energy Requirements    EST Needs (kcal/day) 4955-5143 kcal/d       Protein Requirements    EST Daily Needs (g/day)  gm/d       Fluid Requirements     Estimated Needs (mL/day) 8176-8869 ml/d     Labs/Medications         Pertinent Labs Reviewed.   Results from last 7 days   Lab Units 05/13/22  1719   SODIUM mmol/L 136   POTASSIUM mmol/L 4.2   CHLORIDE mmol/L 98   CO2 mmol/L 19.1*   BUN mg/dL 112*   CREATININE mg/dL 7.22*   CALCIUM mg/dL 9.5   BILIRUBIN mg/dL 1.1   ALK PHOS U/L 89   ALT (SGPT) U/L 13   AST (SGOT) U/L 17   GLUCOSE mg/dL 110*     Results from last 7 days   Lab Units 05/14/22  0026 05/13/22  1719   MAGNESIUM mg/dL 2.5*  --    HEMOGLOBIN g/dL  --  10.9*   HEMATOCRIT %  --  32.2*     No results found for: COVID19  No results found for: HGBA1C      Pertinent Medications Reviewed.     Current Nutrition Orders & Evaluation of Intake       Oral Nutrition   "   Current PO Diet NPO Diet NPO Type: Strict NPO   Supplement No active supplement orders       Nutrition Diagnosis         Nutrition Dx Problem 1 Inadequate nutrient intake related to inadequate oral Intake as evidenced by NPO       Nutrition Intervention         May need to consider SLP evaluation prior to diet advancement.   Advance diet as medically able  RD will follow up to assess diet advancement and PO intake     Monitor/Evaluation        Monitor Per protocol, Pertinent labs, Weight, Skin status, GI status, Swallow function, Diet advancement       Nutrition Discharge Plan         To be determined       Electronically signed by:  Era Lozano RD  05/14/22 08:11 EDT

## 2022-05-14 NOTE — PLAN OF CARE
Goal Outcome Evaluation:  Plan of Care Reviewed With: patient        Progress: no change  Outcome Evaluation: Pt admitted tonight with CARMEL. NS infusing at 125/ml. Soap suds enema given for fecal impaction without results. Turning q2h. Pt drowsy and not completely oriented. Agustin catheter in place due to retention and possible obstruction. Mild edema noted in lower legs. Wife to bring in medication list to finish admission.

## 2022-05-15 LAB
ALBUMIN SERPL-MCNC: 3.6 G/DL (ref 3.5–5.2)
ALBUMIN/GLOB SERPL: 0.9 G/DL
ALP SERPL-CCNC: 77 U/L (ref 39–117)
ALT SERPL W P-5'-P-CCNC: 10 U/L (ref 1–41)
ANION GAP SERPL CALCULATED.3IONS-SCNC: 11.8 MMOL/L (ref 5–15)
AST SERPL-CCNC: 16 U/L (ref 1–40)
BASOPHILS # BLD AUTO: 0.04 10*3/MM3 (ref 0–0.2)
BASOPHILS NFR BLD AUTO: 0.6 % (ref 0–1.5)
BILIRUB SERPL-MCNC: 0.9 MG/DL (ref 0–1.2)
BUN SERPL-MCNC: 64 MG/DL (ref 8–23)
BUN/CREAT SERPL: 18.9 (ref 7–25)
CALCIUM SPEC-SCNC: 8.9 MG/DL (ref 8.6–10.5)
CHLORIDE SERPL-SCNC: 111 MMOL/L (ref 98–107)
CO2 SERPL-SCNC: 19.2 MMOL/L (ref 22–29)
CREAT SERPL-MCNC: 3.39 MG/DL (ref 0.76–1.27)
DEPRECATED RDW RBC AUTO: 49.5 FL (ref 37–54)
EGFRCR SERPLBLD CKD-EPI 2021: 16.9 ML/MIN/1.73
EOSINOPHIL # BLD AUTO: 0.12 10*3/MM3 (ref 0–0.4)
EOSINOPHIL NFR BLD AUTO: 1.8 % (ref 0.3–6.2)
ERYTHROCYTE [DISTWIDTH] IN BLOOD BY AUTOMATED COUNT: 14.1 % (ref 12.3–15.4)
GLOBULIN UR ELPH-MCNC: 4 GM/DL
GLUCOSE SERPL-MCNC: 175 MG/DL (ref 65–99)
HCT VFR BLD AUTO: 30.2 % (ref 37.5–51)
HGB BLD-MCNC: 9.9 G/DL (ref 13–17.7)
IMM GRANULOCYTES # BLD AUTO: 0.02 10*3/MM3 (ref 0–0.05)
IMM GRANULOCYTES NFR BLD AUTO: 0.3 % (ref 0–0.5)
LYMPHOCYTES # BLD AUTO: 1.74 10*3/MM3 (ref 0.7–3.1)
LYMPHOCYTES NFR BLD AUTO: 25.8 % (ref 19.6–45.3)
MCH RBC QN AUTO: 31.1 PG (ref 26.6–33)
MCHC RBC AUTO-ENTMCNC: 32.8 G/DL (ref 31.5–35.7)
MCV RBC AUTO: 95 FL (ref 79–97)
MONOCYTES # BLD AUTO: 0.86 10*3/MM3 (ref 0.1–0.9)
MONOCYTES NFR BLD AUTO: 12.7 % (ref 5–12)
NEUTROPHILS NFR BLD AUTO: 3.97 10*3/MM3 (ref 1.7–7)
NEUTROPHILS NFR BLD AUTO: 58.8 % (ref 42.7–76)
NRBC BLD AUTO-RTO: 0 /100 WBC (ref 0–0.2)
PLATELET # BLD AUTO: 178 10*3/MM3 (ref 140–450)
PMV BLD AUTO: 9.6 FL (ref 6–12)
POTASSIUM SERPL-SCNC: 3.6 MMOL/L (ref 3.5–5.2)
PROT SERPL-MCNC: 7.6 G/DL (ref 6–8.5)
PSA SERPL-MCNC: 3.09 NG/ML (ref 0–4)
RBC # BLD AUTO: 3.18 10*6/MM3 (ref 4.14–5.8)
RBC MORPH BLD: NORMAL
SMALL PLATELETS BLD QL SMEAR: ADEQUATE
SODIUM SERPL-SCNC: 142 MMOL/L (ref 136–145)
WBC MORPH BLD: NORMAL
WBC NRBC COR # BLD: 6.75 10*3/MM3 (ref 3.4–10.8)

## 2022-05-15 PROCEDURE — 80053 COMPREHEN METABOLIC PANEL: CPT | Performed by: INTERNAL MEDICINE

## 2022-05-15 PROCEDURE — 86334 IMMUNOFIX E-PHORESIS SERUM: CPT | Performed by: INTERNAL MEDICINE

## 2022-05-15 PROCEDURE — 85007 BL SMEAR W/DIFF WBC COUNT: CPT | Performed by: INTERNAL MEDICINE

## 2022-05-15 PROCEDURE — 94799 UNLISTED PULMONARY SVC/PX: CPT

## 2022-05-15 PROCEDURE — 84165 PROTEIN E-PHORESIS SERUM: CPT | Performed by: INTERNAL MEDICINE

## 2022-05-15 PROCEDURE — 82784 ASSAY IGA/IGD/IGG/IGM EACH: CPT | Performed by: INTERNAL MEDICINE

## 2022-05-15 PROCEDURE — 85025 COMPLETE CBC W/AUTO DIFF WBC: CPT | Performed by: INTERNAL MEDICINE

## 2022-05-15 RX ORDER — LISINOPRIL 5 MG/1
5 TABLET ORAL DAILY
COMMUNITY
Start: 2022-05-03 | End: 2022-05-17 | Stop reason: HOSPADM

## 2022-05-15 RX ORDER — GLIPIZIDE 2.5 MG/1
2.5 TABLET, EXTENDED RELEASE ORAL DAILY
COMMUNITY

## 2022-05-15 RX ORDER — FLUTICASONE PROPIONATE 50 MCG
2 SPRAY, SUSPENSION (ML) NASAL DAILY PRN
COMMUNITY

## 2022-05-15 RX ORDER — POLYETHYLENE GLYCOL 3350 17 G/17G
17 POWDER, FOR SOLUTION ORAL DAILY
Status: DISCONTINUED | OUTPATIENT
Start: 2022-05-15 | End: 2022-05-17 | Stop reason: HOSPADM

## 2022-05-15 RX ORDER — ROSUVASTATIN CALCIUM 40 MG/1
40 TABLET, COATED ORAL DAILY
COMMUNITY
End: 2022-05-25

## 2022-05-15 RX ORDER — PANTOPRAZOLE SODIUM 40 MG/1
40 TABLET, DELAYED RELEASE ORAL EVERY 24 HOURS
COMMUNITY

## 2022-05-15 RX ORDER — POTASSIUM CHLORIDE 20 MEQ/1
20 TABLET, EXTENDED RELEASE ORAL DAILY
COMMUNITY
Start: 2022-05-03

## 2022-05-15 RX ORDER — NITROGLYCERIN 0.4 MG/1
0.4 TABLET SUBLINGUAL
COMMUNITY

## 2022-05-15 RX ORDER — GABAPENTIN 300 MG/1
300 CAPSULE ORAL 2 TIMES DAILY
COMMUNITY
Start: 2022-05-03

## 2022-05-15 RX ORDER — PYRAZINAMIDE 500 MG/1
1 TABLET ORAL EVERY 8 HOURS PRN
COMMUNITY

## 2022-05-15 RX ADMIN — METOPROLOL SUCCINATE 50 MG: 50 TABLET, EXTENDED RELEASE ORAL at 08:49

## 2022-05-15 RX ADMIN — Medication 10 ML: at 08:50

## 2022-05-15 RX ADMIN — ASPIRIN 81 MG CHEWABLE TABLET 81 MG: 81 TABLET CHEWABLE at 08:49

## 2022-05-15 RX ADMIN — SODIUM CHLORIDE 125 ML/HR: 9 INJECTION, SOLUTION INTRAVENOUS at 01:02

## 2022-05-15 RX ADMIN — Medication 10 ML: at 21:38

## 2022-05-15 RX ADMIN — PANTOPRAZOLE SODIUM 40 MG: 40 TABLET, DELAYED RELEASE ORAL at 06:01

## 2022-05-15 RX ADMIN — GABAPENTIN 100 MG: 100 CAPSULE ORAL at 06:01

## 2022-05-15 RX ADMIN — SODIUM CHLORIDE 125 ML/HR: 9 INJECTION, SOLUTION INTRAVENOUS at 20:38

## 2022-05-15 RX ADMIN — CLOPIDOGREL BISULFATE 75 MG: 75 TABLET ORAL at 10:34

## 2022-05-15 RX ADMIN — CETIRIZINE HYDROCHLORIDE 10 MG: 10 TABLET, FILM COATED ORAL at 08:49

## 2022-05-15 RX ADMIN — SODIUM CHLORIDE 125 ML/HR: 9 INJECTION, SOLUTION INTRAVENOUS at 08:50

## 2022-05-15 RX ADMIN — GABAPENTIN 100 MG: 100 CAPSULE ORAL at 21:38

## 2022-05-15 RX ADMIN — GABAPENTIN 100 MG: 100 CAPSULE ORAL at 15:44

## 2022-05-15 RX ADMIN — POLYETHYLENE GLYCOL 3350 17 G: 17 POWDER, FOR SOLUTION ORAL at 08:49

## 2022-05-15 RX ADMIN — LISINOPRIL 5 MG: 5 TABLET ORAL at 10:34

## 2022-05-15 NOTE — PLAN OF CARE
Goal Outcome Evaluation:  Plan of Care Reviewed With: patient        Progress: improving     Family meeting arranged. Case management on care team. Pt ambulated with a walker and x1 assist to bathroom several times throughout shift.

## 2022-05-15 NOTE — PROGRESS NOTES
[facility]     Progress Note    Patient Name: Selvin Ferguson  : 1935  MRN: [id number]  Primary Care Physician:  Provider, No Known  Date of admission: 2022    Subjective   Subjective     Chief Complaint: CARMEL    HPI:  Patient Reports he is doing well and would like to walk to the bathroom to have a BM    Review of Systems   Review of Systems   Constitutional: Activity change: able to walk    HENT: Negative.    Eyes: Negative.    Respiratory: Negative.    Cardiovascular: Negative.    Gastrointestinal: Positive for constipation.   Endocrine: Negative.    Genitourinary: Negative.    Musculoskeletal: Negative.    Skin: Negative.    Allergic/Immunologic: Negative.    Neurological: Positive for weakness.   Hematological: Negative.    Psychiatric/Behavioral: Negative.        Objective   Objective     Vitals:   Temp:  [97.5 °F (36.4 °C)-98.3 °F (36.8 °C)] 98.3 °F (36.8 °C)  Heart Rate:  [73-88] 73  Resp:  [18-19] 18  BP: (110-138)/(50-65) 124/60  Physical Exam    Constitutional: Awake, alert   Eyes: PERRLA, sclerae anicteric, no conjunctival injection   HENT: NCAT, mucous membranes moist   Neck: Supple, no thyromegaly, no lymphadenopathy, trachea midline   Respiratory: Clear to auscultation bilaterally, nonlabored respirations    Cardiovascular: RRR, no murmurs, rubs, or gallops, palpable pedal pulses bilaterally   Gastrointestinal: Positive bowel sounds, soft, nontender, nondistended   Musculoskeletal: No bilateral ankle edema, no clubbing or cyanosis to extremities   Psychiatric: Appropriate affect, cooperative   Neurologic: Oriented x 3, strength symmetric in all extremities, Cranial Nerves grossly intact to confrontation, speech clear   Skin: No rashes     Result Review    Result Review:  I have personally reviewed the results from the time of this admission to 5/15/2022 10:56 EDT and agree with these findings:  []  Laboratory  []  Microbiology  []  Radiology  []  EKG/Telemetry   []   Cardiology/Vascular   []  Pathology  []  Old records  []  Other:  Most notable findings include: Renal function continues to improve BUN 64 creatinine 3.39 today    Assessment & Plan   Assessment / Plan     Brief Patient Summary:  Selvin Ferguson is a 86 y.o. male who has not been eating and drinking at home and came in very dehydrated    Active Hospital Problems:  Active Hospital Problems    Diagnosis    • Acute kidney injury (HCC)      Plan:   Continue IV fluids labs in a.m.    DVT prophylaxis:  No DVT prophylaxis order currently exists.    CODE STATUS:   Level Of Support Discussed With: Patient  Code Status (Patient has no pulse and is not breathing): CPR (Attempt to Resuscitate)  Medical Interventions (Patient has pulse or is breathing): Full Support      Electronically signed by Ynes Kc MD, 05/15/22, 10:56 AM EDT.

## 2022-05-15 NOTE — CASE MANAGEMENT/SOCIAL WORK
"Discharge Planning Assessment  [facility]     Patient Name: Selvin Ferguson  MRN: [id number]  Today's Date: 5/15/2022    Admit Date: 5/13/2022     Discharge Needs Assessment     Row Name 05/15/22 1509       Living Environment    People in Home alone    Current Living Arrangements home    Duration at Residence owns house, lives there since 1980    Primary Care Provided by self    Provides Primary Care For no one    Family Caregiver if Needed child(meera), adult    Family Caregiver Names both children are available if needed,  Kendra, patient's girlfriend, is also available if needed.    Quality of Family Relationships helpful;involved;supportive    Able to Return to Prior Arrangements yes       Resource/Environmental Concerns    Resource/Environmental Concerns none    Transportation Concerns none  patient drives a car       Transition Planning    Patient/Family Anticipates Transition to home  patient declines inpatient rehab.    Transportation Anticipated family or friend will provide       Discharge Needs Assessment    Readmission Within the Last 30 Days no previous admission in last 30 days    Equipment Currently Used at Home glucometer;walker, standard  has walker but does not need to use.    Concerns to be Addressed discharge planning    Anticipated Changes Related to Illness none    Equipment Needed After Discharge none               Discharge Plan     Row Name 05/15/22 4317       Plan    Plan Assessment done at bedside with patient and Kendra Cleveland.  CM role explained and discharge planning discussed. Face sheet, PCP and Pharmacy verified and updated. Patient PCP was updated to CHAD Renee NP at [facility]. Patient alert and oriented and answering questions coherently. Patient is resting in bed with a chester catheter in place. Patient's SO, Kendra Cleveland who is also Healthcare Surrogate and POA was in room and contradicting patient's statements and stated that \"patient is confused and does not know what he is talking " "about.\" Ms. Cleveland stated that she wanted to leave the room during the assessment stating \"she did not want to get upset when listening to the patient's answers.\" Assessment continued with the patient only. Patient states lives alone in his house and is able to take care of his basic needs. Patient states he normally has his cell phone but Ms. Cleveland has a hold of it currently. All demographic information was updated per patient statement. Patient is an insulin dependent diabetic and states has a glucometer at home. Patient states he takes his own medication. Patient states has a good relationship with his children and they will help him if he asks. Patient has [employer] Benefits at 10% connection. Ms. Cleveland returned to the room. CM requested that we all go over the information together. Ms. Cleveland proceeded to tell the patient what he needs to do and unable to continue reviewing the assessment with Ms. Cleveland. Ms. Cleveland did state that she works six hours a week for the patient through [employer]. Per patient's nurse both the son and daughter came to visit the patient in the hospital. Per the patient's nurse she spoke with patient's niece, Santa Ferguson ([phone]) who lives in [place] and stated she can have patient live with her if needed. She currently takes care of patient's sister. Ms. Cleveland would like patient to go to rehab. Patient has been in [place] in the past. Also agreeable to [facility]. Entered order for PT/OT for assessment. SW notified of above.              Continued Care and Services - Admitted Since 5/13/2022    Coordination has not been started for this encounter.          Demographic Summary     Row Name 05/15/22 1501       General Information    Admission Type inpatient    Arrived From home;physician office - external    Referral Source admission list    Reason for Consult discharge planning    Preferred Language English       Contact Information    Permission Granted to Share Info With " family/designee;power of  for healthcare               Functional Status     Row Name 05/15/22 1502       Functional Status    Usual Activity Tolerance good    Current Activity Tolerance good       Functional Status, IADL    Medications independent;assistive equipment  patient sets up own medication in packets and takes himself    Meal Preparation independent    Housekeeping independent    Laundry independent    Shopping independent    IADL Comments lives alone, reitred cook, completely independent       Mental Status    General Appearance WDL WDL       Mental Status Summary    Recent Changes in Mental Status/Cognitive Functioning no changes       Employment/    Employment Status , previous service;retired    Employment/ Comments [employer] Benefits - 10% connects               Psychosocial    No documentation.                Abuse/Neglect    No documentation.                Legal    No documentation.                Substance Abuse    No documentation.                Patient Forms    No documentation.                   Frances Buckley

## 2022-05-15 NOTE — PLAN OF CARE
Goal Outcome Evaluation:  Plan of Care Reviewed With: patient        Progress: no change  Outcome Evaluation: No acute changes through the night. VSS. Pt attempted to have BM on BSC 3 times but no results. Pushing fluids. Still awaiting medication list for admission finish.

## 2022-05-16 ENCOUNTER — APPOINTMENT (OUTPATIENT)
Dept: ULTRASOUND IMAGING | Facility: HOSPITAL | Age: 87
End: 2022-05-16

## 2022-05-16 LAB
ALBUMIN SERPL-MCNC: 3.3 G/DL (ref 3.5–5.2)
ALBUMIN/GLOB SERPL: 1 G/DL
ALP SERPL-CCNC: 65 U/L (ref 39–117)
ALT SERPL W P-5'-P-CCNC: 8 U/L (ref 1–41)
ANION GAP SERPL CALCULATED.3IONS-SCNC: 11.8 MMOL/L (ref 5–15)
AST SERPL-CCNC: 13 U/L (ref 1–40)
BASOPHILS # BLD AUTO: 0.03 10*3/MM3 (ref 0–0.2)
BASOPHILS NFR BLD AUTO: 0.5 % (ref 0–1.5)
BILIRUB SERPL-MCNC: 0.7 MG/DL (ref 0–1.2)
BUN SERPL-MCNC: 46 MG/DL (ref 8–23)
BUN/CREAT SERPL: 17 (ref 7–25)
CALCIUM SPEC-SCNC: 8.4 MG/DL (ref 8.6–10.5)
CHLORIDE SERPL-SCNC: 114 MMOL/L (ref 98–107)
CO2 SERPL-SCNC: 19.2 MMOL/L (ref 22–29)
CREAT SERPL-MCNC: 2.71 MG/DL (ref 0.76–1.27)
DEPRECATED RDW RBC AUTO: 47.9 FL (ref 37–54)
EGFRCR SERPLBLD CKD-EPI 2021: 22.2 ML/MIN/1.73
EOSINOPHIL # BLD AUTO: 0.13 10*3/MM3 (ref 0–0.4)
EOSINOPHIL NFR BLD AUTO: 2.3 % (ref 0.3–6.2)
ERYTHROCYTE [DISTWIDTH] IN BLOOD BY AUTOMATED COUNT: 14 % (ref 12.3–15.4)
GLOBULIN UR ELPH-MCNC: 3.4 GM/DL
GLUCOSE SERPL-MCNC: 124 MG/DL (ref 65–99)
HCT VFR BLD AUTO: 27.6 % (ref 37.5–51)
HGB BLD-MCNC: 9 G/DL (ref 13–17.7)
HOLD SPECIMEN: NORMAL
IMM GRANULOCYTES # BLD AUTO: 0.03 10*3/MM3 (ref 0–0.05)
IMM GRANULOCYTES NFR BLD AUTO: 0.5 % (ref 0–0.5)
LYMPHOCYTES # BLD AUTO: 1.47 10*3/MM3 (ref 0.7–3.1)
LYMPHOCYTES NFR BLD AUTO: 26.4 % (ref 19.6–45.3)
MCH RBC QN AUTO: 30.4 PG (ref 26.6–33)
MCHC RBC AUTO-ENTMCNC: 32.6 G/DL (ref 31.5–35.7)
MCV RBC AUTO: 93.2 FL (ref 79–97)
MONOCYTES # BLD AUTO: 0.87 10*3/MM3 (ref 0.1–0.9)
MONOCYTES NFR BLD AUTO: 15.6 % (ref 5–12)
NEUTROPHILS NFR BLD AUTO: 3.03 10*3/MM3 (ref 1.7–7)
NEUTROPHILS NFR BLD AUTO: 54.7 % (ref 42.7–76)
NRBC BLD AUTO-RTO: 0 /100 WBC (ref 0–0.2)
PLATELET # BLD AUTO: 155 10*3/MM3 (ref 140–450)
PMV BLD AUTO: 9.2 FL (ref 6–12)
POTASSIUM SERPL-SCNC: 3.5 MMOL/L (ref 3.5–5.2)
PROT SERPL-MCNC: 6.7 G/DL (ref 6–8.5)
RBC # BLD AUTO: 2.96 10*6/MM3 (ref 4.14–5.8)
SODIUM SERPL-SCNC: 145 MMOL/L (ref 136–145)
WBC NRBC COR # BLD: 5.56 10*3/MM3 (ref 3.4–10.8)

## 2022-05-16 PROCEDURE — 85025 COMPLETE CBC W/AUTO DIFF WBC: CPT | Performed by: INTERNAL MEDICINE

## 2022-05-16 PROCEDURE — 80053 COMPREHEN METABOLIC PANEL: CPT | Performed by: INTERNAL MEDICINE

## 2022-05-16 PROCEDURE — 76775 US EXAM ABDO BACK WALL LIM: CPT

## 2022-05-16 PROCEDURE — 94799 UNLISTED PULMONARY SVC/PX: CPT

## 2022-05-16 PROCEDURE — 97165 OT EVAL LOW COMPLEX 30 MIN: CPT

## 2022-05-16 PROCEDURE — 97161 PT EVAL LOW COMPLEX 20 MIN: CPT

## 2022-05-16 RX ADMIN — METOPROLOL SUCCINATE 50 MG: 50 TABLET, EXTENDED RELEASE ORAL at 09:46

## 2022-05-16 RX ADMIN — POLYETHYLENE GLYCOL 3350 17 G: 17 POWDER, FOR SOLUTION ORAL at 09:46

## 2022-05-16 RX ADMIN — NITROGLYCERIN 1 PATCH: 0.4 PATCH TRANSDERMAL at 09:46

## 2022-05-16 RX ADMIN — GABAPENTIN 100 MG: 100 CAPSULE ORAL at 06:14

## 2022-05-16 RX ADMIN — Medication 10 ML: at 21:00

## 2022-05-16 RX ADMIN — PANTOPRAZOLE SODIUM 40 MG: 40 TABLET, DELAYED RELEASE ORAL at 06:14

## 2022-05-16 RX ADMIN — SODIUM CHLORIDE 125 ML/HR: 9 INJECTION, SOLUTION INTRAVENOUS at 21:04

## 2022-05-16 RX ADMIN — Medication 10 ML: at 09:48

## 2022-05-16 RX ADMIN — CLOPIDOGREL BISULFATE 75 MG: 75 TABLET ORAL at 09:46

## 2022-05-16 RX ADMIN — LISINOPRIL 5 MG: 5 TABLET ORAL at 09:46

## 2022-05-16 RX ADMIN — CETIRIZINE HYDROCHLORIDE 10 MG: 10 TABLET, FILM COATED ORAL at 09:46

## 2022-05-16 RX ADMIN — ASPIRIN 81 MG CHEWABLE TABLET 81 MG: 81 TABLET CHEWABLE at 09:46

## 2022-05-16 RX ADMIN — GABAPENTIN 100 MG: 100 CAPSULE ORAL at 14:25

## 2022-05-16 RX ADMIN — GABAPENTIN 100 MG: 100 CAPSULE ORAL at 21:00

## 2022-05-16 NOTE — PLAN OF CARE
SRIKANTH overnight. VSS. Patient safety maintained. ID band on, bed in low position, wheels locked, call light within reach. Hourly rounding.     Problem: Adult Inpatient Plan of Care  Goal: Absence of Hospital-Acquired Illness or Injury  Intervention: Identify and Manage Fall Risk  Recent Flowsheet Documentation  Taken 5/16/2022 0500 by Kaylynn Foley RN  Safety Promotion/Fall Prevention:   safety round/check completed   clutter free environment maintained  Taken 5/16/2022 0400 by Kaylynn Foley RN  Safety Promotion/Fall Prevention:   safety round/check completed   clutter free environment maintained  Taken 5/16/2022 0300 by Kaylynn Foley RN  Safety Promotion/Fall Prevention:   safety round/check completed   clutter free environment maintained   assistive device/personal items within reach  Taken 5/16/2022 0200 by Kaylynn Foley RN  Safety Promotion/Fall Prevention:   safety round/check completed   clutter free environment maintained   room organization consistent  Taken 5/16/2022 0000 by Kaylynn Foley RN  Safety Promotion/Fall Prevention:   safety round/check completed   assistive device/personal items within reach   clutter free environment maintained  Taken 5/15/2022 2200 by Kaylynn Foley RN  Safety Promotion/Fall Prevention:   safety round/check completed   clutter free environment maintained  Taken 5/15/2022 2030 by Kaylynn Foley RN  Safety Promotion/Fall Prevention:   assistive device/personal items within reach   lighting adjusted   safety round/check completed  Intervention: Prevent Skin Injury  Recent Flowsheet Documentation  Taken 5/16/2022 0200 by Kaylynn Foley RN  Body Position: position changed independently  Taken 5/16/2022 0000 by Kaylynn Foley RN  Body Position: position changed independently  Taken 5/15/2022 2200 by Kaylynn Foley RN  Body Position: position changed independently  Taken 5/15/2022 2030 by Kaylynn Foley RN  Body Position: position changed  independently  Skin Protection:   adhesive use limited   incontinence pads utilized  Intervention: Prevent and Manage VTE (Venous Thromboembolism) Risk  Recent Flowsheet Documentation  Taken 5/15/2022 2030 by Kaylynn Foley RN  Range of Motion: active ROM (range of motion) encouraged  Goal: Optimal Comfort and Wellbeing  Intervention: Provide Person-Centered Care  Recent Flowsheet Documentation  Taken 5/15/2022 2030 by Kaylynn Foley RN  Trust Relationship/Rapport:   care explained   questions encouraged   emotional support provided     Problem: Adult Inpatient Plan of Care  Goal: Absence of Hospital-Acquired Illness or Injury  Intervention: Prevent Skin Injury  Recent Flowsheet Documentation  Taken 5/16/2022 0200 by Kaylynn Foley RN  Body Position: position changed independently  Taken 5/16/2022 0000 by Kaylynn Foley RN  Body Position: position changed independently  Taken 5/15/2022 2200 by Kaylynn Foley RN  Body Position: position changed independently  Taken 5/15/2022 2030 by Kaylynn Foley RN  Body Position: position changed independently  Skin Protection:   adhesive use limited   incontinence pads utilized     Problem: Fall Injury Risk  Goal: Absence of Fall and Fall-Related Injury  Intervention: Identify and Manage Contributors  Recent Flowsheet Documentation  Taken 5/15/2022 2030 by Kaylynn oFley RN  Medication Review/Management: medications reviewed  Intervention: Promote Injury-Free Environment  Recent Flowsheet Documentation  Taken 5/16/2022 0500 by Kaylynn Foley RN  Safety Promotion/Fall Prevention:   safety round/check completed   clutter free environment maintained  Taken 5/16/2022 0400 by Kaylynn Foley RN  Safety Promotion/Fall Prevention:   safety round/check completed   clutter free environment maintained  Taken 5/16/2022 0300 by Kaylynn Foley RN  Safety Promotion/Fall Prevention:   safety round/check completed   clutter free environment maintained    assistive device/personal items within reach  Taken 5/16/2022 0200 by Kaylynn Foley, RN  Safety Promotion/Fall Prevention:   safety round/check completed   clutter free environment maintained   room organization consistent  Taken 5/16/2022 0000 by Kaylynn Foley RN  Safety Promotion/Fall Prevention:   safety round/check completed   assistive device/personal items within reach   clutter free environment maintained  Taken 5/15/2022 2200 by Kaylynn Foley RN  Safety Promotion/Fall Prevention:   safety round/check completed   clutter free environment maintained  Taken 5/15/2022 2030 by Kaylynn Foley, RN  Safety Promotion/Fall Prevention:   assistive device/personal items within reach   lighting adjusted   safety round/check completed     Problem: Hypertension Comorbidity  Goal: Blood Pressure in Desired Range  Intervention: Maintain Blood Pressure Management  Recent Flowsheet Documentation  Taken 5/15/2022 2030 by Kaylynn Foley, RN  Medication Review/Management: medications reviewed   Goal Outcome Evaluation:

## 2022-05-16 NOTE — THERAPY EVALUATION
Patient Name: Selvin Ferguson  : 1935    MRN: [id number]                              Today's Date: 2022       Admit Date: 2022    Visit Dx:     ICD-10-CM ICD-9-CM   1. Acute kidney injury (HCC)  N17.9 584.9   2. Uremia  N19 586   3. Elevated troponin  R77.8 790.6   4. Constipation, unspecified constipation type  K59.00 564.00   5. Acute urinary retention  R33.8 788.29   6. Decreased activities of daily living (ADL)  Z78.9 V49.89     Patient Active Problem List   Diagnosis   • Acute kidney injury (HCC)     Past Medical History:   Diagnosis Date   • Dementia (HCC)    • Diabetes mellitus (HCC)    • Hyperlipidemia    • Hypertension    • Renal disorder      History reviewed. No pertinent surgical history.   General Information     Row Name 22 1609          OT Time and Intention    Document Type evaluation  -ES     Mode of Treatment individual therapy;occupational therapy  -ES     Row Name 22 1609          General Information    Patient Profile Reviewed yes  -ES     Prior Level of Function independent:  Patient reports independence with B and IADLs at baseline.  No device for functional mobility, has rolling walker if needed.  Walk-in shower, standing shower completion.  Grooming stands at sink.  No home O2 use.  -ES     Existing Precautions/Restrictions fall  -ES     Row Name 22 1609          Occupational Profile    Reason for Services/Referral (Occupational Profile) Patient is 86 yr old male admitted to [facility] on 2022 with reports of abdominal pain and swelling, decreased appetite and dehydration. OT evaluation and treatment ordered d/t recent decline in ADLs/transfer ability. No previous OT services for current condition.  -ES     Row Name 22 1609          Living Environment    People in Home alone  -ES     Row Name 22 1609          Home Main Entrance    Number of Stairs, Main Entrance none  -ES     Row Name 22 1609          Stairs Within  Home, Primary    Number of Stairs, Within Home, Primary none  -ES     Row Name 05/16/22 1609          Cognition    Orientation Status (Cognition) oriented to;person;place  Patient is pleasant and cooperative. Patient oriented to person and place, provided reorientation for time and situation.  -     Row Name 05/16/22 1609          Safety Issues, Functional Mobility    Impairments Affecting Function (Mobility) balance;strength;endurance/activity tolerance;shortness of breath  -ES           User Key  (r) = Recorded By, (t) = Taken By, (c) = Cosigned By    Initials Name Provider Type    ES Martita Lee OT Occupational Therapist                 Mobility/ADL's     Row Name 05/16/22 1611          Bed Mobility    Comment, (Bed Mobility) Patient met seated in recliner at therapy arrival to room  -     Row Name 05/16/22 1611          Transfers    Transfers sit-stand transfer;toilet transfer  -ES     Sit-Stand Post (Transfers) contact guard;1 person assist  -ES     [place] Level (Toilet Transfer) moderate assist (50% patient effort);1 person assist;verbal cues  -ES     Assistive Device (Toilet Transfer) grab bars/safety frame;walker, front-wheeled  -ES     Row Name 05/16/22 1611          Sit-Stand Transfer    Assistive Device (Sit-Stand Transfers) walker, front-wheeled  -ES     Row Name 05/16/22 1611          Toilet Transfer    Type (Toilet Transfer) sit-stand;stand-sit  -     Row Name 05/16/22 1611          Functional Mobility    Functional Mobility- Ind. Level contact guard assist  -ES     Functional Mobility- Device walker, front-wheeled  -ES     Functional Mobility- Comment Patient performs functional mobility from recliner to bathroom for toileting task and returns to recliner. Patient CGA with mobility with use of rolling walker  -ES     Row Name 05/16/22 1611          Activities of Daily Living    BADL Assessment/Intervention bathing;upper body dressing;lower body  dressing;grooming;feeding;toileting  -ES     Row Name 05/16/22 1611          Bathing Assessment/Intervention    [place] Level (Bathing) bathing skills;minimum assist (75% patient effort)  -ES     Row Name 05/16/22 1611          Upper Body Dressing Assessment/Training    [place] Level (Upper Body Dressing) upper body dressing skills;set up  -ES     Row Name 05/16/22 1611          Lower Body Dressing Assessment/Training    [place] Level (Lower Body Dressing) lower body dressing skills;minimum assist (75% patient effort)  -ES     Row Name 05/16/22 1611          Grooming Assessment/Training    [place] Level (Grooming) grooming skills;set up  -ES     Row Name 05/16/22 1611          Self-Feeding Assessment/Training    [place] Level (Feeding) feeding skills;set up  -ES     [facility] Name 05/16/22 1611          Toileting Assessment/Training    [place] Level (Toileting) toileting skills;contact guard assist  -ES           User Key  (r) = Recorded By, (t) = Taken By, (c) = Cosigned By    Initials Name Provider Type    ES Martita Lee OT Occupational Therapist               Obj/Interventions     [facility] Name 05/16/22 1612          Sensory Assessment (Somatosensory)    Sensory Assessment (Somatosensory) bilateral UE  -ES     Bilateral UE Sensory Assessment general sensation;intact  -ES     [facility] Name 05/16/22 1612          Vision Assessment/Intervention    Visual Impairment/Limitations WFL;corrective lenses full-time  -ES     [facility] Name 05/16/22 1612          Range of Motion Comprehensive    General Range of Motion no range of motion deficits identified;bilateral upper extremity ROM WNL  -ES     [facility] Name 05/16/22 1612          Strength Comprehensive (MMT)    Comment, General Manual Muscle Testing (MMT) Assessment bilateral upper extremities assessed 4/5  -ES     [facility] Name 05/16/22 1612          Motor Skills    Motor Skills coordination;functional endurance  -ES     Coordination WFL;bilateral;upper extremity   -ES     Functional Endurance fair. Patient demonstrates shortness of breath with mobility to return to recliner. Decreased tolerance noted with mobility and transfers  -ES     Row Name 05/16/22 1612          Balance    Balance Assessment sitting dynamic balance;standing dynamic balance  -ES     Dynamic Sitting Balance supervision  -ES     Position, Sitting Balance unsupported  -ES     Dynamic Standing Balance contact guard  -ES     Position/Device Used, Standing Balance supported;walker, front-wheeled  -ES           User Key  (r) = Recorded By, (t) = Taken By, (c) = Cosigned By    Initials Name Provider Type    ES Martita Lee, OT Occupational Therapist               Goals/Plan     Row Name 05/16/22 1614          Transfer Goal 1 (OT)    Activity/Assistive Device (Transfer Goal 1, OT) transfers, all  -ES     [place] Level/Cues Needed (Transfer Goal 1, OT) modified independence  -ES     Time Frame (Transfer Goal 1, OT) long term goal (LTG);10 days  -ES     Row Name 05/16/22 1614          Bathing Goal 1 (OT)    Activity/Device (Bathing Goal 1, OT) bathing skills, all  -ES     [place] Level/Cues Needed (Bathing Goal 1, OT) modified independence  -ES     Time Frame (Bathing Goal 1, OT) long term goal (LTG);10 days  -ES     Row Name 05/16/22 1614          Dressing Goal 1 (OT)    Activity/Device (Dressing Goal 1, OT) dressing skills, all  -ES     [place]/Cues Needed (Dressing Goal 1, OT) independent  -ES     Time Frame (Dressing Goal 1, OT) long term goal (LTG);10 days  -ES     Row Name 05/16/22 1614          Toileting Goal 1 (OT)    Activity/Device (Toileting Goal 1, OT) toileting skills, all  -ES     [place] Level/Cues Needed (Toileting Goal 1, OT) modified independence  -ES     Time Frame (Toileting Goal 1, OT) long term goal (LTG);10 days  -ES     Row Name 05/16/22 1614          Grooming Goal 1 (OT)    Activity/Device (Grooming Goal 1, OT) grooming skills, all  -ES     [place] (Grooming Goal  1, OT) independent  -ES     Time Frame (Grooming Goal 1, OT) long term goal (LTG);10 days  -ES     Row Name 05/16/22 1614          Self-Feeding Goal 1 (OT)    Activity/Device (Self-Feeding Goal 1, OT) self-feeding skills, all  -ES     [place] Level/Cues Needed (Self-Feeding Goal 1, OT) independent  -ES     Time Frame (Self-Feeding Goal 1, OT) 10 days  -ES     Row Name 05/16/22 1614          Problem Specific Goal 1 (OT)    Problem Specific Goal 1 (OT) Patient will tolerate 5 min dynamic standing task requiring zero rest breaks for increased activity tolerance required for independent ADL routine completion at time of discharge  -ES     Time Frame (Problem Specific Goal 1, OT) long term goal (LTG);10 days  -ES     Row Name 05/16/22 1614          Therapy Assessment/Plan (OT)    Planned Therapy Interventions (OT) activity tolerance training;BADL retraining;functional balance retraining;occupation/activity based interventions;patient/caregiver education/training;strengthening exercise;transfer/mobility retraining  -ES           User Key  (r) = Recorded By, (t) = Taken By, (c) = Cosigned By    Initials Name Provider Type    ES Martita Lee, OT Occupational Therapist               Clinical Impression     Row Name 05/16/22 1613          Plan of Care Review    Plan of Care Reviewed With patient;family  -ES     Progress no change  initial evaluation encounter  -ES     Outcome Evaluation Patient has experienced decline in function from baseline status, presenting w/ deficits related to activity tolerance, functional balance, functional transfer, self care management and functional mobility that impede patient independence with activities of daily living. Patient would benefit from skilled OT intervention to maxamize patient safety, prevent further debility and promote return to optimal level of independence.  -ES     Row Name 05/16/22 1613          Therapy Assessment/Plan (OT)    Rehab Potential (OT) good, to achieve  stated therapy goals  -ES     Criteria for Skilled Therapeutic Interventions Met (OT) yes;meets criteria;skilled treatment is necessary  -ES     Therapy Frequency (OT) 5 times/wk  -ES     Row Name 05/16/22 1613          Therapy Plan Review/Discharge Plan (OT)    Equipment Needs Upon Discharge (OT) walker, rolling  -ES     Anticipated Discharge Disposition (OT) home with home health;home with 24/7 care  -ES           User Key  (r) = Recorded By, (t) = Taken By, (c) = Cosigned By    Initials Name Provider Type    Martita Garcia OT Occupational Therapist               Outcome Measures     Row Name 05/16/22 1615          How much help from another is currently needed...    Putting on and taking off regular lower body clothing? 3  -ES     Bathing (including washing, rinsing, and drying) 3  -ES     Toileting (which includes using toilet bed pan or urinal) 3  -ES     Putting on and taking off regular upper body clothing 4  -ES     Taking care of personal grooming (such as brushing teeth) 4  -ES     Eating meals 4  -ES     AM-PAC 6 Clicks Score (OT) 21  -ES     Row Name 05/16/22 1615          Functional Assessment    Outcome Measure Options AM-PAC 6 Clicks Daily Activity (OT);Optimal Instrument  -ES     Row Name 05/16/22 1615          Optimal Instrument    Optimal Instrument Optimal - 3  -ES     Bending/Stooping 2  -ES     Standing 2  -ES     Reaching 1  -ES     From the list, choose the 3 activities you would most like to be able to do without any difficulty Bending/stooping;Standing;Reaching  -ES     Total Score Optimal - 3 5  -ES           User Key  (r) = Recorded By, (t) = Taken By, (c) = Cosigned By    Initials Name Provider Type    Martita Garcia OT Occupational Therapist                Occupational Therapy Education                 Title: PT OT SLP Therapies (Done)     Topic: Occupational Therapy (Done)     Point: ADL training (Done)     Description:   Instruct learner(s) on proper safety adaptation and  remediation techniques during self care or transfers.   Instruct in proper use of assistive devices.              Learning Progress Summary           Patient Acceptance, E,TB, VU by  at 5/16/2022 1616                   Point: Home exercise program (Done)     Description:   Instruct learner(s) on appropriate technique for monitoring, assisting and/or progressing therapeutic exercises/activities.              Learning Progress Summary           Patient Acceptance, E,TB, VU by  at 5/16/2022 1616                   Point: Precautions (Done)     Description:   Instruct learner(s) on prescribed precautions during self-care and functional transfers.              Learning Progress Summary           Patient Acceptance, E,TB, VU by  at 5/16/2022 1616                   Point: Body mechanics (Done)     Description:   Instruct learner(s) on proper positioning and spine alignment during self-care, functional mobility activities and/or exercises.              Learning Progress Summary           Patient Acceptance, E,TB, VU by  at 5/16/2022 1616                               User Key     Initials Effective Dates Name Provider Type Discipline     09/13/21 -  Martita Lee OT Occupational Therapist OT              OT Recommendation and Plan  Planned Therapy Interventions (OT): activity tolerance training, BADL retraining, functional balance retraining, occupation/activity based interventions, patient/caregiver education/training, strengthening exercise, transfer/mobility retraining  Therapy Frequency (OT): 5 times/wk  Plan of Care Review  Plan of Care Reviewed With: patient, family  Progress: no change (initial evaluation encounter)  Outcome Evaluation: Patient has experienced decline in function from baseline status, presenting w/ deficits related to activity tolerance, functional balance, functional transfer, self care management and functional mobility that impede patient independence with activities of daily living.  Patient would benefit from skilled OT intervention to maxamize patient safety, prevent further debility and promote return to optimal level of independence.     Time Calculation:    Time Calculation- OT     Row Name 05/16/22 1616             Time Calculation- OT    OT Received On 05/16/22  -ES      OT Goal Re-Cert Due Date 05/25/22  -ES              Untimed Charges    OT Eval/Re-eval Minutes 32  -ES              Total Minutes    Untimed Charges Total Minutes 32  -ES       Total Minutes 32  -ES            User Key  (r) = Recorded By, (t) = Taken By, (c) = Cosigned By    Initials Name Provider Type    ES Martita Lee OT Occupational Therapist              Therapy Charges for Today     Code Description Service Date Service Provider Modifiers Qty    82168448389 HC OT EVAL LOW COMPLEXITY 3 5/16/2022 Martita Lee OT GO 1               Martita Lee OT  5/16/2022

## 2022-05-16 NOTE — PLAN OF CARE
Goal Outcome Evaluation:  Plan of Care Reviewed With: patient, family        Progress: no change (initial evaluation encounter)  Outcome Evaluation: Patient has experienced decline in function from baseline status, presenting w/ deficits related to activity tolerance, functional balance, functional transfer, self care management and functional mobility that impede patient independence with activities of daily living. Patient would benefit from skilled OT intervention to maxamize patient safety, prevent further debility and promote return to optimal level of independence.

## 2022-05-16 NOTE — THERAPY EVALUATION
Acute Care - Physical Therapy Initial Evaluation   [place]     Patient Name: Selvin Ferguson  : 1935  MRN: [id number]  Today's Date: 2022      Visit Dx:     ICD-10-CM ICD-9-CM   1. Acute kidney injury (HCC)  N17.9 584.9   2. Uremia  N19 586   3. Elevated troponin  R77.8 790.6   4. Constipation, unspecified constipation type  K59.00 564.00   5. Acute urinary retention  R33.8 788.29   6. Decreased activities of daily living (ADL)  Z78.9 V49.89   7. Difficulty walking  R26.2 719.7     Patient Active Problem List   Diagnosis   • Acute kidney injury (HCC)     Past Medical History:   Diagnosis Date   • Dementia (HCC)    • Diabetes mellitus (HCC)    • Hyperlipidemia    • Hypertension    • Renal disorder      History reviewed. No pertinent surgical history.  PT Assessment (last 12 hours)     PT Evaluation and Treatment     Row Name 22 1618          Physical Therapy Time and Intention    Document Type evaluation  -AV     Mode of Treatment individual therapy;physical therapy  -AV     Row Name 22 1618          General Information    Patient Profile Reviewed yes  -AV     Prior Level of Function independent:;all household mobility;gait;transfer;ADL's  Reports family stops by daily and can assist if needed. Ambulated without an assistive device. No home O2.  -AV     Equipment Currently Used at Home none  -AV     Existing Precautions/Restrictions fall  -AV     Row Name 22 1618          Living Environment    Current Living Arrangements home  -AV     People in Home alone  -AV     Row Name 22 1618          Range of Motion (ROM)    Range of Motion bilateral lower extremities;ROM is WFL  -AV     Row Name 22 1618          Strength (Manual Muscle Testing)    Strength (Manual Muscle Testing) bilateral lower extremities;strength is WFL  -AV     Row Name 22 1618          Bed Mobility    Comment, (Bed Mobility) Patient seated upright in recliner upon therapist entry.  -AV     Row Name  05/16/22 1618          Transfers    Transfers sit-stand transfer;toilet transfer  -AV     Sit-Stand [place] (Transfers) contact guard;1 person assist  -AV     [place] Level (Toilet Transfer) standby assist  -AV     Assistive Device (Toilet Transfer) grab bars/safety frame;walker, front-wheeled  -AV     Row Name 05/16/22 1618          Sit-Stand Transfer    Assistive Device (Sit-Stand Transfers) walker, front-wheeled  -AV     Row Name 05/16/22 1618          Toilet Transfer    Type (Toilet Transfer) sit-stand;stand-sit  -AV     Row Name 05/16/22 1618          Gait/Stairs (Locomotion)    Gait/Stairs Locomotion gait/ambulation independence;gait/ambulation assistive device;distance ambulated  -AV     [place] Level (Gait) contact guard  -AV     Assistive Device (Gait) walker, front-wheeled  -AV     Distance in Feet (Gait) 25  x2  -AV     Pattern (Gait) step-to  -AV     Row Name 05/16/22 1618          Safety Issues, Functional Mobility    Impairments Affecting Function (Mobility) balance;endurance/activity tolerance;shortness of breath  -AV     Row Name 05/16/22 1618          Balance    Balance Assessment standing dynamic balance  -AV     Dynamic Standing Balance contact guard  -AV     Position/Device Used, Standing Balance supported;walker, front-wheeled  -AV     Row Name 05/16/22 1618          Plan of Care Review    Plan of Care Reviewed With patient  -AV     Progress no change  -AV     Outcome Evaluation Patient presents with deficits in balance, endurance, transfers, and ambulation. Patient will benefit from skilled PT services to address these mobility deficits and decrease risk of falls.  -AV     Row Name 05/16/22 1618          Therapy Assessment/Plan (PT)    Rehab Potential (PT) good, to achieve stated therapy goals  -AV     Criteria for Skilled Interventions Met (PT) yes;meets criteria  -AV     Therapy Frequency (PT) daily  -AV     Problem List (PT) problems related to;balance;mobility  -AV      Activity Limitations Related to Problem List (PT) unable to ambulate safely;unable to transfer safely  -AV     Row Name 05/16/22 1618          PT Evaluation Complexity    History, PT Evaluation Complexity no personal factors and/or comorbidities  -AV     Examination of Body Systems (PT Eval Complexity) total of 4 or more elements  -AV     Clinical Presentation (PT Evaluation Complexity) stable  -AV     Clinical Decision Making (PT Evaluation Complexity) low complexity  -AV     Overall Complexity (PT Evaluation Complexity) low complexity  -AV     Row Name 05/16/22 1618          Therapy Plan Review/Discharge Plan (PT)    Therapy Plan Review (PT) evaluation/treatment results reviewed;patient  -AV     Row Name 05/16/22 1618          Physical Therapy Goals    Transfer Goal Selection (PT) transfer, PT goal 1  -AV     Gait Training Goal Selection (PT) gait training, PT goal 1  -AV     Row Name 05/16/22 1618          Transfer Goal 1 (PT)    Activity/Assistive Device (Transfer Goal 1, PT) transfers, all;walker, rolling  -AV     [place] Level/Cues Needed (Transfer Goal 1, PT) modified independence  -AV     Time Frame (Transfer Goal 1, PT) 10 days  -AV     Row Name 05/16/22 1618          Gait Training Goal 1 (PT)    Activity/Assistive Device (Gait Training Goal 1, PT) gait (walking locomotion);assistive device use;walker, rolling  -AV     [place] Level (Gait Training Goal 1, PT) modified independence  -AV     Distance (Gait Training Goal 1, PT) 200  -AV     Time Frame (Gait Training Goal 1, PT) 10 days  -AV           User Key  (r) = Recorded By, (t) = Taken By, (c) = Cosigned By    Initials Name Provider Type    Toribio Lang, PT Physical Therapist                Physical Therapy Education                 Title: PT OT SLP Therapies (In Progress)     Topic: Physical Therapy (In Progress)     Point: Mobility training (Done)     Learning Progress Summary           Patient Acceptance, E,TB, VU by AV at 5/16/2022  1623                   Point: Home exercise program (Not Started)     Learner Progress:  Not documented in this visit.          Point: Body mechanics (Done)     Learning Progress Summary           Patient Acceptance, E,TB, VU by AV at 5/16/2022 1623                   Point: Precautions (Done)     Learning Progress Summary           Patient Acceptance, E,TB, VU by AV at 5/16/2022 1623                               User Key     Initials Effective Dates Name Provider Type Discipline    AV 06/11/21 -  Toribio Brown, PT Physical Therapist PT              PT Recommendation and Plan  Anticipated Discharge Disposition (PT): home with home health  Planned Therapy Interventions (PT): balance training, bed mobility training, gait training, neuromuscular re-education, strengthening, transfer training  Therapy Frequency (PT): daily  Plan of Care Reviewed With: patient  Progress: no change  Outcome Evaluation: Patient presents with deficits in balance, endurance, transfers, and ambulation. Patient will benefit from skilled PT services to address these mobility deficits and decrease risk of falls.   Outcome Measures     Row Name 05/16/22 1622             How much help from another person do you currently need...    Turning from your back to your side while in flat bed without using bedrails? 3  -AV      Moving from lying on back to sitting on the side of a flat bed without bedrails? 3  -AV      Moving to and from a bed to a chair (including a wheelchair)? 3  -AV      Standing up from a chair using your arms (e.g., wheelchair, bedside chair)? 3  -AV      Climbing 3-5 steps with a railing? 3  -AV      To walk in hospital room? 3  -AV      AM-PAC 6 Clicks Score (PT) 18  -AV              Functional Assessment    Outcome Measure Options AM-PAC 6 Clicks Basic Mobility (PT)  -AV            User Key  (r) = Recorded By, (t) = Taken By, (c) = Cosigned By    Initials Name Provider Type    AV Toribio Brown, PT Physical Therapist                  Time Calculation:    PT Charges     Row Name 05/16/22 1622             Time Calculation    PT Received On 05/16/22  -AV      PT Goal Re-Cert Due Date 05/25/22  -AV              Untimed Charges    PT Eval/Re-eval Minutes 35  -AV              Total Minutes    Untimed Charges Total Minutes 35  -AV       Total Minutes 35  -AV            User Key  (r) = Recorded By, (t) = Taken By, (c) = Cosigned By    Initials Name Provider Type    AV Toribio Brown, PT Physical Therapist              Therapy Charges for Today     Code Description Service Date Service Provider Modifiers Qty    75281962714 HC PT EVAL LOW COMPLEXITY 3 5/16/2022 Toribio Brown, PT GP 1          PT G-Codes  Outcome Measure Options: AM-PAC 6 Clicks Basic Mobility (PT)  AM-PAC 6 Clicks Score (PT): 18  AM-PAC 6 Clicks Score (OT): 21    Toribio Brown PT  5/16/2022

## 2022-05-16 NOTE — PROGRESS NOTES
[facility]     Progress Note    Patient Name: Selvin Ferguson  : 1935  MRN: [id number]  Primary Care Physician:  Kayla Renee, CAYETANO  Date of admission: 2022      Subjective   Brief summary.  Chart reviewed patient admitted with acute renal failure      HPI:  Awake and alert.  No complaints except for fatigue.  No chest pain, no shortness of breath    Review of Systems     Making good urine  No nausea vomiting or diarrhea  No chest pain  No abdominal pain      Objective     Vitals:   Temp:  [97.2 °F (36.2 °C)-98.6 °F (37 °C)] 98.1 °F (36.7 °C)  Heart Rate:  [71-80] 71  Resp:  [18] 18  BP: (121-149)/(54-61) 138/54    Physical Exam :     Elderly male not in acute distress  Heart regular  Lungs clear  Abdomen soft nontender      Result Review:  I have personally reviewed the results from the time of this admission to 2022 19:41 EDT and agree with these findings:  [x]  Laboratory  []  Microbiology  []  Radiology  []  EKG/Telemetry   []  Cardiology/Vascular   []  Pathology  []  Old records  []  Other:           Assessment / Plan       Active Hospital Problems:  Active Hospital Problems    Diagnosis    • Acute kidney injury (HCC)        Plan:   Improving.  Continue fluids.  Check ultrasound kidneys.  Check a PSA.  Increase activity       DVT prophylaxis:  No DVT prophylaxis order currently exists.    CODE STATUS:   Level Of Support Discussed With: Patient  Code Status (Patient has no pulse and is not breathing): CPR (Attempt to Resuscitate)  Medical Interventions (Patient has pulse or is breathing): Full Support            Electronically signed by Ed Smith MD, 22, 7:41 PM EDT.

## 2022-05-16 NOTE — PLAN OF CARE
Goal Outcome Evaluation:  Plan of Care Reviewed With: patient, durable power of            Outcome Evaluation: Patient ambulating to bathroom with walker and standby assist. Agustin present, adequate UOP, patient reports large bowel movement and relief of abdominal discomfort. Family at bedside today with questions about POC, family was informed that per POA all questions should be directed to her. POA updated on POC. VSS, no s/s of acute distress noted, call light within reach.

## 2022-05-16 NOTE — CONSULTS
"Nutrition Services    Patient Name: Selvin Ferguson  YOB: 1935  MRN: [id number]  Admission date: 5/13/2022      CLINICAL NUTRITION ASSESSMENT      Reason for Assessment  MST score 2+     H&P:    Past Medical History:   Diagnosis Date   • Dementia (HCC)    • Diabetes mellitus (HCC)    • Hyperlipidemia    • Hypertension    • Renal disorder         Current Problems:   Active Hospital Problems    Diagnosis    • Acute kidney injury (HCC)         Nutrition/Diet History         Narrative     RD f/u for diet advancement. Pt began regular, renal diet on 5/14. Consuming % of all meals with current diet. Last BM documented to have been on 5/5 (admit 5/13), pt received soap suds enema on 5/14 without result. Remains on IVF. BLE mild edema noted. Weight obtained on 5/15 show loss of 18 lbs x 1 day - questionable accuracy however noted to be taken on standing scale vs bed scale used for previous weights which may contribute to difference in weights. RD visited during lunch meal, observed good intake of lunch, pt reported being able to have BM earlier. Pt falling asleep during assessment but able to endorse natural teeth with no biting/chewing issues and denies swallowing difficulty. Pt also reported UBW of ~200 which is consistent with admit weights. CTM per protocol.       Anthropometrics        Current Height, Weight Height: 180.3 cm (70.98\")  Weight: 80.6 kg (177 lb 11.1 oz)   Current BMI Body mass index is 24.79 kg/m².       Weight Hx  Wt Readings from Last 30 Encounters:   05/15/22 0257 80.6 kg (177 lb 11.1 oz)   05/14/22 0209 88.8 kg (195 lb 12.3 oz)   05/13/22 2303 88.8 kg (195 lb 12.3 oz)            Wt Change Observation No previous weight hx, questionable accuracy with most recent weight     Estimated/Assessed Needs       Energy Requirements    EST Needs (kcal/day) 2778-3047 kcal/d       Protein Requirements    EST Daily Needs (g/day)  gm/d       Fluid Requirements     Estimated Needs " (mL/day) 5310-9545 ml/d     Labs/Medications         Pertinent Labs Reviewed.   Results from last 7 days   Lab Units 05/16/22  0612 05/15/22  1144 05/14/22  1108   SODIUM mmol/L 145 142 141   POTASSIUM mmol/L 3.5 3.6 3.9   CHLORIDE mmol/L 114* 111* 107   CO2 mmol/L 19.2* 19.2* 18.2*   BUN mg/dL 46* 64* 95*   CREATININE mg/dL 2.71* 3.39* 5.45*   CALCIUM mg/dL 8.4* 8.9 8.9   BILIRUBIN mg/dL 0.7 0.9 1.0   ALK PHOS U/L 65 77 78   ALT (SGPT) U/L 8 10 10   AST (SGOT) U/L 13 16 17   GLUCOSE mg/dL 124* 175* 94     Results from last 7 days   Lab Units 05/16/22  0612 05/14/22  1108 05/14/22  0026   MAGNESIUM mg/dL  --   --  2.5*   HEMOGLOBIN g/dL 9.0*   < >  --    HEMATOCRIT % 27.6*   < >  --     < > = values in this interval not displayed.     No results found for: COVID19  No results found for: HGBA1C      Pertinent Medications Reviewed.     Current Nutrition Orders & Evaluation of Intake       Oral Nutrition     Current PO Diet Diet Regular; Renal   Supplement No active supplement orders       Nutrition Diagnosis         Nutrition Dx Problem 1 Inadequate nutrient intake related to inadequate oral Intake as evidenced by report of minimal PO intake.       Nutrition Intervention              Monitor/Evaluation        Monitor Per protocol, Pertinent labs, Weight, Skin status, GI status       Nutrition Discharge Plan         To be determined       Electronically signed by:  MIRIAM NORRIS RD  05/16/22 10:03 EDT

## 2022-05-17 VITALS
TEMPERATURE: 98.6 F | DIASTOLIC BLOOD PRESSURE: 48 MMHG | OXYGEN SATURATION: 100 % | HEIGHT: 71 IN | WEIGHT: 177.69 LBS | SYSTOLIC BLOOD PRESSURE: 120 MMHG | HEART RATE: 76 BPM | BODY MASS INDEX: 24.88 KG/M2 | RESPIRATION RATE: 18 BRPM

## 2022-05-17 PROBLEM — R33.8 ACUTE URINARY RETENTION: Status: ACTIVE | Noted: 2022-05-17

## 2022-05-17 PROBLEM — N13.30 BILATERAL HYDRONEPHROSIS: Status: ACTIVE | Noted: 2022-05-17

## 2022-05-17 PROBLEM — N17.9 ACUTE KIDNEY INJURY (HCC): Status: RESOLVED | Noted: 2022-05-14 | Resolved: 2022-05-17

## 2022-05-17 PROBLEM — R33.8 ACUTE URINARY RETENTION: Status: RESOLVED | Noted: 2022-05-17 | Resolved: 2022-05-17

## 2022-05-17 PROBLEM — E11.9 DM (DIABETES MELLITUS): Chronic | Status: ACTIVE | Noted: 2022-05-17

## 2022-05-17 PROBLEM — N19 UREMIA: Status: ACTIVE | Noted: 2022-05-17

## 2022-05-17 LAB
ALBUMIN SERPL ELPH-MCNC: 3 G/DL (ref 2.9–4.4)
ALBUMIN SERPL-MCNC: 2.9 G/DL (ref 3.5–5.2)
ALBUMIN/GLOB SERPL: 0.8 G/DL
ALBUMIN/GLOB SERPL: 0.9 {RATIO} (ref 0.7–1.7)
ALP SERPL-CCNC: 62 U/L (ref 39–117)
ALPHA1 GLOB SERPL ELPH-MCNC: 0.3 G/DL (ref 0–0.4)
ALPHA2 GLOB SERPL ELPH-MCNC: 0.9 G/DL (ref 0.4–1)
ALT SERPL W P-5'-P-CCNC: 9 U/L (ref 1–41)
ANION GAP SERPL CALCULATED.3IONS-SCNC: 11.7 MMOL/L (ref 5–15)
AST SERPL-CCNC: 14 U/L (ref 1–40)
B-GLOBULIN SERPL ELPH-MCNC: 1 G/DL (ref 0.7–1.3)
BASOPHILS # BLD AUTO: 0.03 10*3/MM3 (ref 0–0.2)
BASOPHILS NFR BLD AUTO: 0.5 % (ref 0–1.5)
BILIRUB SERPL-MCNC: 0.7 MG/DL (ref 0–1.2)
BUN SERPL-MCNC: 30 MG/DL (ref 8–23)
BUN/CREAT SERPL: 13.8 (ref 7–25)
CALCIUM SPEC-SCNC: 8.1 MG/DL (ref 8.6–10.5)
CHLORIDE SERPL-SCNC: 114 MMOL/L (ref 98–107)
CO2 SERPL-SCNC: 20.3 MMOL/L (ref 22–29)
CREAT SERPL-MCNC: 2.18 MG/DL (ref 0.76–1.27)
DEPRECATED RDW RBC AUTO: 47.3 FL (ref 37–54)
EGFRCR SERPLBLD CKD-EPI 2021: 28.8 ML/MIN/1.73
EOSINOPHIL # BLD AUTO: 0.17 10*3/MM3 (ref 0–0.4)
EOSINOPHIL NFR BLD AUTO: 2.8 % (ref 0.3–6.2)
ERYTHROCYTE [DISTWIDTH] IN BLOOD BY AUTOMATED COUNT: 14 % (ref 12.3–15.4)
GAMMA GLOB SERPL ELPH-MCNC: 1.4 G/DL (ref 0.4–1.8)
GLOBULIN SER-MCNC: 3.7 G/DL (ref 2.2–3.9)
GLOBULIN UR ELPH-MCNC: 3.6 GM/DL
GLUCOSE SERPL-MCNC: 135 MG/DL (ref 65–99)
HCT VFR BLD AUTO: 28.8 % (ref 37.5–51)
HGB BLD-MCNC: 9.5 G/DL (ref 13–17.7)
IGA SERPL-MCNC: 435 MG/DL (ref 61–437)
IGG SERPL-MCNC: 1435 MG/DL (ref 603–1613)
IGM SERPL-MCNC: 27 MG/DL (ref 15–143)
IMM GRANULOCYTES # BLD AUTO: 0.01 10*3/MM3 (ref 0–0.05)
IMM GRANULOCYTES NFR BLD AUTO: 0.2 % (ref 0–0.5)
INTERPRETATION SERPL IEP-IMP: NORMAL
LABORATORY COMMENT REPORT: NORMAL
LYMPHOCYTES # BLD AUTO: 1.77 10*3/MM3 (ref 0.7–3.1)
LYMPHOCYTES NFR BLD AUTO: 28.9 % (ref 19.6–45.3)
M PROTEIN SERPL ELPH-MCNC: NORMAL G/DL
MCH RBC QN AUTO: 30.9 PG (ref 26.6–33)
MCHC RBC AUTO-ENTMCNC: 33 G/DL (ref 31.5–35.7)
MCV RBC AUTO: 93.8 FL (ref 79–97)
MONOCYTES # BLD AUTO: 0.91 10*3/MM3 (ref 0.1–0.9)
MONOCYTES NFR BLD AUTO: 14.8 % (ref 5–12)
NEUTROPHILS NFR BLD AUTO: 3.24 10*3/MM3 (ref 1.7–7)
NEUTROPHILS NFR BLD AUTO: 52.8 % (ref 42.7–76)
NRBC BLD AUTO-RTO: 0 /100 WBC (ref 0–0.2)
PLATELET # BLD AUTO: 153 10*3/MM3 (ref 140–450)
PMV BLD AUTO: 9.3 FL (ref 6–12)
POTASSIUM SERPL-SCNC: 3.5 MMOL/L (ref 3.5–5.2)
PROT SERPL-MCNC: 6.5 G/DL (ref 6–8.5)
PROT SERPL-MCNC: 6.7 G/DL (ref 6–8.5)
QT INTERVAL: 367 MS
QT INTERVAL: 374 MS
RBC # BLD AUTO: 3.07 10*6/MM3 (ref 4.14–5.8)
SODIUM SERPL-SCNC: 146 MMOL/L (ref 136–145)
WBC NRBC COR # BLD: 6.13 10*3/MM3 (ref 3.4–10.8)

## 2022-05-17 PROCEDURE — 84154 ASSAY OF PSA FREE: CPT | Performed by: INTERNAL MEDICINE

## 2022-05-17 PROCEDURE — 85025 COMPLETE CBC W/AUTO DIFF WBC: CPT | Performed by: INTERNAL MEDICINE

## 2022-05-17 PROCEDURE — 84153 ASSAY OF PSA TOTAL: CPT | Performed by: INTERNAL MEDICINE

## 2022-05-17 PROCEDURE — 80053 COMPREHEN METABOLIC PANEL: CPT | Performed by: INTERNAL MEDICINE

## 2022-05-17 RX ORDER — TAMSULOSIN HYDROCHLORIDE 0.4 MG/1
1 CAPSULE ORAL NIGHTLY
Qty: 30 CAPSULE | Refills: 0 | Status: SHIPPED | OUTPATIENT
Start: 2022-05-17 | End: 2022-06-16

## 2022-05-17 RX ADMIN — PANTOPRAZOLE SODIUM 40 MG: 40 TABLET, DELAYED RELEASE ORAL at 05:39

## 2022-05-17 RX ADMIN — CETIRIZINE HYDROCHLORIDE 10 MG: 10 TABLET, FILM COATED ORAL at 09:58

## 2022-05-17 RX ADMIN — GABAPENTIN 100 MG: 100 CAPSULE ORAL at 05:40

## 2022-05-17 RX ADMIN — NITROGLYCERIN 1 PATCH: 0.4 PATCH TRANSDERMAL at 09:58

## 2022-05-17 RX ADMIN — CLOPIDOGREL BISULFATE 75 MG: 75 TABLET ORAL at 09:58

## 2022-05-17 RX ADMIN — LISINOPRIL 5 MG: 5 TABLET ORAL at 09:58

## 2022-05-17 RX ADMIN — Medication 10 ML: at 09:59

## 2022-05-17 RX ADMIN — ASPIRIN 81 MG CHEWABLE TABLET 81 MG: 81 TABLET CHEWABLE at 09:58

## 2022-05-17 RX ADMIN — GABAPENTIN 100 MG: 100 CAPSULE ORAL at 13:38

## 2022-05-17 RX ADMIN — METOPROLOL SUCCINATE 50 MG: 50 TABLET, EXTENDED RELEASE ORAL at 09:58

## 2022-05-17 NOTE — PLAN OF CARE
Goal Outcome Evaluation:  Plan of Care Reviewed With: durable power of         Progress: improving  Outcome Evaluation: Agustin catheter still present, patient has adequate UOP, catheter care complete this shift. Patient ambulating with walker and standby assist. VSS, call light within reach, no acute events during shift.

## 2022-05-17 NOTE — PLAN OF CARE
Goal Outcome Evaluation:  Plan of Care Reviewed With: patient        Progress: no change  Outcome Evaluation: No acute changes overnight, pt has rested well but does attempt to get out of bed when he feels urge to urinate and forgets that a chester catheter is in place, will continue to monitor

## 2022-05-17 NOTE — DISCHARGE SUMMARY
[facility]         DISCHARGE SUMMARY    Patient Name: Selvin Ferguson  : 1935  MRN: [id number]    Date of Admission: 2022  Date of Discharge:2022  Primary Care Physician: Kayla Renee APRN    Consults     Date and Time Order Name Status Description    2022 12:52 AM General MD Inpatient Consult            Presenting Problem:   Uremia [N19]  Elevated troponin [R77.8]  Acute kidney injury (HCC) [N17.9]  Acute urinary retention [R33.8]  Constipation, unspecified constipation type [K59.00]    Active and Resolved Hospital Problems:  Active Hospital Problems    Diagnosis POA   • Uremia [N19] Yes   • Bilateral hydronephrosis [N13.30] Yes   • DM (diabetes mellitus) (HCC) [E11.9] Yes      Resolved Hospital Problems    Diagnosis POA   • Acute urinary retention [R33.8] Unknown   • Acute kidney injury (HCC) [N17.9] Yes         Hospital Course     Hospital Course:  Selvin Ferguson is a 86 y.o. male Admitted to hospital for generalized weakness, abdominal pain and decreased urine output.  Work-up in the ER revealed uremia and acute kidney injury.  Patient is on diuretics and ACE inhibitor's.  Patient was started on fluids and admitted to medical service.    Please see H&P for details.    Patient was seen by me yesterday morning for follow-up.  Patient was admitted by Dr. SHELDON Kc.  Fluids were continued.  Patient improved with these measures he was awake alert and oriented he was feeling stronger.  He was able to get up and walk on his own to the bathroom. Patient had indwelling Agustin catheter, ultrasound of the kidneys showed bilateral mild hydronephrosis suggestive of outlet obstruction.  We will continue with Agustin catheter and start Flomax and train him out of Agustin . His ultrasound of the kidneys showed bilateral mild hydronephrosis I believe it is related to prostate enlargement and outlet obstruction.  We will start patient on Flomaxn the next couple of weeks. Today his  BUN/creatinine is back to baseline.  In fact they are better than baseline.  Patient also feels stronger. .    Patient's initial lab work showed mild elevation in troponin most likely related to renal failure.  Patient asymptomatic without any chest pain repeat labs stable.  Patient wants to go home.  We discussed with patient's family/son and patient agreeable to go to inpatient rehab.  We will discharge him to [facility].  Kidney functions and potassium needs to be monitored closely.  Patient will be discharged to nursing home today        DISCHARGE Follow Up Recommendations for labs and diagnostics:   Discharged to [facility]  PT and OT  Monitor labs and blood pressure    Day of Discharge     Vital Signs:  Temp:  [98.2 °F (36.8 °C)-99.3 °F (37.4 °C)] 98.6 °F (37 °C)  Heart Rate:  [75-80] 76  Resp:  [18] 18  BP: (112-132)/(48-78) 120/48    Physical Exam:    Elderly male not in acute distress.  Neck supple  Heart regular  Lungs clear  Abdomen soft nontender.   Ext no edema  Pertinent  and/or Most Recent Results     LAB RESULTS:      Lab 05/17/22  0457 05/16/22  0612 05/15/22  1144 05/14/22  1108 05/14/22  0026 05/13/22  1719   WBC 6.13 5.56 6.75 5.56  --  6.70   HEMOGLOBIN 9.5* 9.0* 9.9* 10.8*  --  10.9*   HEMATOCRIT 28.8* 27.6* 30.2* 32.6*  --  32.2*   PLATELETS 153 155 178 162  --  167   NEUTROS ABS 3.24 3.03 3.97 3.42  --  3.97   IMMATURE GRANS (ABS) 0.01 0.03 0.02 0.02  --  0.03   LYMPHS ABS 1.77 1.47 1.74 1.21  --  1.45   MONOS ABS 0.91* 0.87 0.86 0.79  --  1.16*   EOS ABS 0.17 0.13 0.12 0.09  --  0.07   MCV 93.8 93.2 95.0 93.1  --  91.7   PROTIME  --   --   --   --  15.6*  --    APTT  --   --   --   --  37.3*  --          Lab 05/17/22  0457 05/16/22  0612 05/15/22  1144 05/14/22  1108 05/14/22  0026 05/13/22  1719   SODIUM 146* 145 142 141  --  136   POTASSIUM 3.5 3.5 3.6 3.9  --  4.2   CHLORIDE 114* 114* 111* 107  --  98   CO2 20.3* 19.2* 19.2* 18.2*  --  19.1*    ANION GAP 11.7 11.8 11.8 15.8*  --  18.9*   BUN 30* 46* 64* 95*  --  112*   CREATININE 2.18* 2.71* 3.39* 5.45*  --  7.22*   EGFR 28.8* 22.2* 16.9* 9.6*  --  6.8*   GLUCOSE 135* 124* 175* 94  --  110*   CALCIUM 8.1* 8.4* 8.9 8.9  --  9.5   MAGNESIUM  --   --   --   --  2.5*  --          Lab 05/17/22  0457 05/16/22  0612 05/15/22  1144 05/14/22  1108 05/13/22  1719   TOTAL PROTEIN 6.5 6.7 7.6 7.6 8.7*   ALBUMIN 2.90* 3.30* 3.60  3.0 3.70 4.40   GLOBULIN 3.6 3.4 4.0 3.9 4.3   ALT (SGPT) 9 8 10 10 13   AST (SGOT) 14 13 16 17 17   BILIRUBIN 0.7 0.7 0.9 1.0 1.1   ALK PHOS 62 65 77 78 89   LIPASE  --   --   --   --  75*         Lab 05/14/22  0026 05/13/22  1719   TROPONIN T  --  0.033*   PROTIME 15.6*  --    INR 1.22*  --                  Brief Urine Lab Results  (Last result in the past 365 days)      Color   Clarity   Blood   Leuk Est   Nitrite   Protein   CREAT   Urine HCG        05/13/22 2337 Yellow   Clear   Negative   Negative   Negative   Negative               Microbiology Results (last 10 days)     ** No results found for the last 240 hours. **          PROCEDURES:    [unfilled]    XR Abdomen 2+ VW with Chest 1 VW    Result Date: 5/14/2022  Impression:    1. Moderate to large amount of formed stool is suspected within the rectosigmoid colon.  There may be fecal stasis as with constipation.  Fecal impaction is possible.   2. No mechanical bowel obstruction is suspected.  No pneumoperitoneum.   3. There are low lung volumes and mild bibasilar subsegmental atelectasis.   4. Probably no acute infiltrate.    COMMENT:  Part of this note is an electronic transcription of spoken language to printed text. The electronic translation/transcription may permit erroneous, or at times, nonsensical (or even sensical) words or phrases to be inadvertently transcribed or omitted; this  has reviewed the note for such errors (as well as additional errors); however, some may still exist.  MARISSA WHITE JR, MD        Electronically Signed and Approved By: MARISSA WHITE JR, MD on 5/14/2022 at 0:02              US Renal Bilateral    Result Date: 5/17/2022  Impression:   1. There is new moderate bilateral hydronephrosis. 2. Simple bilateral kidney cysts. 3. The left kidney is smaller than the right significantly and appears echogenic, which could correspond to chronic medical renal disease.      SUSAN MYERS MD       Electronically Signed and Approved By: SUSAN MYERS MD on 5/17/2022 at 0:16                           Labs Pending at Discharge:  Pending Labs     Order Current Status    PSA Total+% Free (Serial) In process            Discharge Details        Discharge Medications      New Medications      Instructions Start Date   tamsulosin 0.4 MG capsule 24 hr capsule  Commonly known as: FLOMAX   0.4 mg, Oral, Nightly         Continue These Medications      Instructions Start Date   acetaminophen-codeine 300-30 MG per tablet  Commonly known as: TYLENOL with CODEINE #3   1 tablet, Oral, Every 8 Hours PRN      Aspirin 81 MG capsule   81 mg, Oral, Every 24 Hours      clopidogrel 75 MG tablet  Commonly known as: PLAVIX   75 mg, Oral, Every 24 Hours      fexofenadine 180 MG tablet  Commonly known as: ALLEGRA   180 mg, Oral, Daily      fluticasone 50 MCG/ACT nasal spray  Commonly known as: FLONASE   2 sprays, Each Nare, Daily PRN      gabapentin 300 MG capsule  Commonly known as: NEURONTIN   300 mg, Oral, 2 Times Daily      glipizide 2.5 MG 24 hr tablet  Commonly known as: GLUCOTROL XL   2.5 mg, Oral, Daily      metoprolol succinate XL 50 MG 24 hr tablet  Commonly known as: TOPROL-XL   50 mg, Oral, Daily      nitroglycerin 0.4 MG SL tablet  Commonly known as: NITROSTAT   0.4 mg, Oral, Every 5 Minutes PRN      pantoprazole 40 MG EC tablet  Commonly known as: PROTONIX   40 mg, Oral, Every 24 Hours      potassium chloride 20 MEQ CR tablet  Commonly known as: K-DUR,KLOR-CON   20 mEq, Oral, Daily      rosuvastatin 40 MG tablet  Commonly  known as: CRESTOR   40 mg, Oral, Daily         Stop These Medications    lisinopril 5 MG tablet  Commonly known as: PRINIVIL,ZESTRIL            No Known Allergies      Discharge Disposition:     [facility]    Diet:  Heart healthy, cardiac and renal diet    Discharge Activity:     Activity Instructions     Activity as Tolerated              Future Appointments   Date Time Provider Department Center   5/25/2022 10:00 AM Anette Wu MD [facility]       Additional Instructions for the Follow-ups that You Need to Schedule     Discharge Follow-up with PCP   As directed       Currently Documented PCP:    Kayla Renee APRN    PCP Phone Number:    [phone]     Follow Up Details: Next week         Discharge Follow-up with Specified Provider: Dr Ed Smith in 2-3 weeks   As directed      To: Dr Ed Smith in 2-3 weeks               Time spent on Discharge including face to face service:  33 minutes.            I have dictated this note utilizing Dragon Dictation.             Please note that portions of this note were completed with a voice recognition program.             Part of this note may be an electronic transcription/translation of spoken language to printed text         using the Dragon Dictation System.       Electronically signed by Ed Smith MD, 05/17/22, 4:51 PM EDT.

## 2022-05-17 NOTE — PLAN OF CARE
Goal Outcome Evaluation:  Plan of Care Reviewed With: patient        Progress: improving  Outcome Evaluation: Agustin catheter still present, patient has adequate UOP, catheter care complete this shift. Patient ambulating with walker and standby assist. VSS, call light within reach, no acute events during shift.

## 2022-05-18 LAB
PSA FREE MFR SERPL: 23.6 %
PSA FREE SERPL-MCNC: 0.59 NG/ML
PSA SERPL-MCNC: 2.5 NG/ML (ref 0–4)

## 2022-05-25 ENCOUNTER — OFFICE VISIT (OUTPATIENT)
Dept: UROLOGY | Facility: CLINIC | Age: 87
End: 2022-05-25

## 2022-05-25 VITALS
TEMPERATURE: 98.4 F | DIASTOLIC BLOOD PRESSURE: 67 MMHG | SYSTOLIC BLOOD PRESSURE: 145 MMHG | HEIGHT: 71 IN | HEART RATE: 83 BPM | WEIGHT: 196 LBS | BODY MASS INDEX: 27.44 KG/M2

## 2022-05-25 DIAGNOSIS — N40.1 BPH WITH OBSTRUCTION/LOWER URINARY TRACT SYMPTOMS: ICD-10-CM

## 2022-05-25 DIAGNOSIS — R33.9 URINARY RETENTION: Primary | ICD-10-CM

## 2022-05-25 DIAGNOSIS — N13.8 BPH WITH OBSTRUCTION/LOWER URINARY TRACT SYMPTOMS: ICD-10-CM

## 2022-05-25 LAB
BACTERIA UR QL AUTO: ABNORMAL /HPF
BILIRUB BLD-MCNC: NEGATIVE MG/DL
CLARITY, POC: CLEAR
COLOR UR: YELLOW
EPI CELLS #/AREA URNS HPF: 0 /[HPF]
EXPIRATION DATE: ABNORMAL
GLUCOSE UR STRIP-MCNC: NEGATIVE MG/DL
KETONES UR QL: NEGATIVE
LEUKOCYTE EST, POC: ABNORMAL
Lab: ABNORMAL
NITRITE UR-MCNC: NEGATIVE MG/ML
PH UR: 7 [PH] (ref 5–8)
PROT UR STRIP-MCNC: ABNORMAL MG/DL
RBC # UR STRIP: ABNORMAL /HPF
RBC # UR STRIP: ABNORMAL /UL
RENAL EPITHELIAL, POC: 0
SP GR UR: 1.02 (ref 1–1.03)
UNIDENT CRYS URNS QL MICRO: ABNORMAL /HPF
UROBILINOGEN UR QL: ABNORMAL
WBC # UR STRIP: ABNORMAL /HPF

## 2022-05-25 PROCEDURE — 81003 URINALYSIS AUTO W/O SCOPE: CPT | Performed by: UROLOGY

## 2022-05-25 PROCEDURE — 51702 INSERT TEMP BLADDER CATH: CPT | Performed by: UROLOGY

## 2022-05-25 PROCEDURE — 87077 CULTURE AEROBIC IDENTIFY: CPT | Performed by: UROLOGY

## 2022-05-25 PROCEDURE — 87086 URINE CULTURE/COLONY COUNT: CPT | Performed by: UROLOGY

## 2022-05-25 PROCEDURE — 99203 OFFICE O/P NEW LOW 30 MIN: CPT | Performed by: UROLOGY

## 2022-05-25 PROCEDURE — 87186 SC STD MICRODIL/AGAR DIL: CPT | Performed by: UROLOGY

## 2022-05-25 RX ORDER — ATORVASTATIN CALCIUM 40 MG/1
40 TABLET, FILM COATED ORAL DAILY
COMMUNITY

## 2022-05-25 NOTE — PROGRESS NOTES
"Chief Complaint  Urinary Retention    BPH    Subjective Patient is in no acute distress        Selvin Ferguson presents to [facility] UROLOGY  History of Present Illness    86-year-old -American male has developed dementia for last 2 to 3 years.  Was doing fairly well till about 8 May he stopped eating and drinking and was incontinent at home all the time.  Patient became incoherent and was seen in emergency room and was found to have uremia, bilateral hydronephrosis and overflow incontinence.  Patient has a catheter and right now his and rehab center.  Patient has improved and his creatinine has come to baseline and according to the notes it was like 2.1.    At this time patient is feeling cold and weak.  Does have weakness of lower extremities.  According to the wife he got some antibiotics yesterday for UTI    Objective Patient is weak and is feeling cold  Vital Signs:   /67   Pulse 83   Temp 98.4 °F (36.9 °C)   Ht 180.3 cm (71\")   Wt 88.9 kg (196 lb)   BMI 27.34 kg/m²     No Known Allergies   Past medical history:  has a past medical history of Dementia (HCC), Diabetes mellitus (HCC), Hyperlipidemia, Hypertension, and Renal disorder.   Past surgical history:  has no past surgical history on file.  Personal history: Family history is unknown by patient.  Social history:  reports that he has never smoked. He has never used smokeless tobacco. He reports previous alcohol use. He reports that he does not use drugs.    Review of Systems    Please read past medical and surgical history.    Physical Exam  Constitutional:       General: He is not in acute distress.     Appearance: Normal appearance. He is normal weight. He is not ill-appearing or toxic-appearing.   HENT:      Head: Normocephalic and atraumatic.      Ears:      Comments: No loss of hearing  Cardiovascular:      Rate and Rhythm: Normal rate and regular rhythm.      Pulses: Normal pulses.      Heart sounds: Normal " heart sounds. No murmur heard.  Pulmonary:      Effort: Pulmonary effort is normal.      Breath sounds: Rales present. No rhonchi.      Comments: Base of right lung  Abdominal:      General: Abdomen is flat.      Palpations: Abdomen is soft.      Tenderness: There is no abdominal tenderness. There is no right CVA tenderness or left CVA tenderness.      Hernia: A hernia is present.      Comments: Left inguinal hernia   Genitourinary:     Comments: Normal penis.    Patient has a Agustin catheter in place.    Both testicles and scrotum is normal    Prostate gland is just about 25 g and benign      Musculoskeletal:         General: No swelling. Normal range of motion.      Cervical back: Normal range of motion and neck supple. No rigidity or tenderness.      Right lower leg: No edema.      Left lower leg: No edema.   Lymphadenopathy:      Cervical: No cervical adenopathy.   Skin:     General: Skin is warm.      Coloration: Skin is not jaundiced.   Neurological:      General: No focal deficit present.      Mental Status: He is alert and oriented to person, place, and time.      Motor: Weakness present.      Gait: Gait abnormal.   Psychiatric:         Mood and Affect: Mood normal.         Behavior: Behavior normal.         Thought Content: Thought content normal.         Judgment: Judgment normal.        Result Review :                 Assessment and Plan    Diagnoses and all orders for this visit:    1. Urinary retention (Primary)    2. BPH with obstruction/lower urinary tract symptoms      Urine does not show any nitrites.  I did catheter removal trial in the office and he did not empty his urinary bladder.  I cannot see ultrasound of bladder showing trabeculated bladder and the median lobe sticking in the bladder.  I reinserted the Agustin catheter using 16 Upper sorbian in the urinary bladder and will bring him back after next week for cystoscopy to see if he can do UroLift for the patient.  Brief Urine Lab Results  (Last result  in the past 365 days)      Color   Clarity   Blood   Leuk Est   Nitrite   Protein   CREAT   Urine HCG        05/22/22 1400 Yellow   Turbid   Moderate (2+)   Large (3+)   Negative   100 mg/dL (2+)                  Follow Up   No follow-ups on file.  Patient was given instructions and counseling regarding his condition or for health maintenance advice. Please see specific information pulled into the AVS if appropriate.     Anette Wu MD

## 2022-05-27 DIAGNOSIS — R33.9 URINARY RETENTION: Primary | ICD-10-CM

## 2022-05-27 LAB — BACTERIA SPEC AEROBE CULT: ABNORMAL

## 2022-05-27 RX ORDER — SULFAMETHOXAZOLE AND TRIMETHOPRIM 800; 160 MG/1; MG/1
1 TABLET ORAL 2 TIMES DAILY
Qty: 10 TABLET | Refills: 0 | Status: SHIPPED | OUTPATIENT
Start: 2022-05-27

## 2022-06-08 ENCOUNTER — PROCEDURE VISIT (OUTPATIENT)
Dept: UROLOGY | Facility: CLINIC | Age: 87
End: 2022-06-08

## 2022-06-08 DIAGNOSIS — N13.8 BPH WITH OBSTRUCTION/LOWER URINARY TRACT SYMPTOMS: ICD-10-CM

## 2022-06-08 DIAGNOSIS — N40.1 BPH WITH OBSTRUCTION/LOWER URINARY TRACT SYMPTOMS: ICD-10-CM

## 2022-06-08 DIAGNOSIS — N13.30 BILATERAL HYDRONEPHROSIS: ICD-10-CM

## 2022-06-08 DIAGNOSIS — E11.65 TYPE 2 DIABETES MELLITUS WITH HYPERGLYCEMIA, WITHOUT LONG-TERM CURRENT USE OF INSULIN: ICD-10-CM

## 2022-06-08 DIAGNOSIS — R33.9 URINARY RETENTION: Primary | ICD-10-CM

## 2022-06-08 PROCEDURE — 52000 CYSTOURETHROSCOPY: CPT | Performed by: UROLOGY

## 2022-06-08 NOTE — PROGRESS NOTES
Cystoscopy    Date/Time: 6/8/2022 9:06 AM  Performed by: Anette Wu MD  Authorized by: Anette Wu MD   Preparation: Patient was prepped and draped in the usual sterile fashion.  Local anesthesia used: yes    Anesthesia:  Local anesthesia used: yes  Local Anesthetic: topical anesthetic  Anesthetic total: 12 mL    Sedation:  Patient sedated: no        Indication.  Urinary retention.  Patient has a Agustin catheter.    Patient has diabetes mellitus.    Patient was placed in lithotomy position.  Thorough scrubbing of lower abdomen and external genitalia was performed with Hibiclens.  18 Olympus flexible cystoscope was inserted into the urethra, which was normal.  Prostate gland is wide open except small amount of tissue right at the apex.  Bladder is extremely trabeculated with multiple diverticuli.  There is no bladder tumor present in both ureteral orifices are normal.  I do not see any bladder tumors.   Flexible cystoscope was removed and patient tolerated the procedure very well.    Try to do uroflow on the patient but patient could not urinate.  Patient has 174 mL of urine in the urinary bladder.    I reinserted the Agustin catheter and will attached to a leg bag.  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  If patient has detrusor function I can open him up with UroLift.  I will see him after urodynamic testings which will tell me if there is any detrusor function or not.

## 2022-06-24 ENCOUNTER — OFFICE VISIT (OUTPATIENT)
Dept: UROLOGY | Facility: CLINIC | Age: 87
End: 2022-06-24

## 2022-06-24 VITALS
DIASTOLIC BLOOD PRESSURE: 55 MMHG | HEIGHT: 71 IN | TEMPERATURE: 98 F | WEIGHT: 196 LBS | SYSTOLIC BLOOD PRESSURE: 132 MMHG | BODY MASS INDEX: 27.44 KG/M2 | HEART RATE: 70 BPM

## 2022-06-24 DIAGNOSIS — R33.9 URINARY RETENTION: Primary | ICD-10-CM

## 2022-06-24 DIAGNOSIS — N31.9 NEUROGENIC BLADDER: ICD-10-CM

## 2022-06-24 LAB
BILIRUB BLD-MCNC: NEGATIVE MG/DL
CLARITY, POC: CLEAR
COLOR UR: YELLOW
GLUCOSE UR STRIP-MCNC: NEGATIVE MG/DL
KETONES UR QL: NEGATIVE
LEUKOCYTE EST, POC: ABNORMAL
NITRITE UR-MCNC: POSITIVE MG/ML
PH UR: 6 [PH] (ref 5–8)
PROT UR STRIP-MCNC: ABNORMAL MG/DL
RBC # UR STRIP: ABNORMAL /UL
SP GR UR: 1.02 (ref 1–1.03)
UROBILINOGEN UR QL: NORMAL

## 2022-06-24 PROCEDURE — 87186 SC STD MICRODIL/AGAR DIL: CPT | Performed by: UROLOGY

## 2022-06-24 PROCEDURE — 99212 OFFICE O/P EST SF 10 MIN: CPT | Performed by: UROLOGY

## 2022-06-24 PROCEDURE — 87086 URINE CULTURE/COLONY COUNT: CPT | Performed by: UROLOGY

## 2022-06-24 PROCEDURE — 87077 CULTURE AEROBIC IDENTIFY: CPT | Performed by: UROLOGY

## 2022-06-24 PROCEDURE — 81002 URINALYSIS NONAUTO W/O SCOPE: CPT | Performed by: UROLOGY

## 2022-06-24 NOTE — PROGRESS NOTES
"Chief Complaint  Urinary Retention    Has a Agustin catheter    Subjective  No acute distress        Selvin Ferguson presents to [facility] UROLOGY  History of Present Illness    Patient is in urinary retention and has a Agustin catheter which is open and draining in his clothes.  Patient does not want to leavea bag.  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    Objective No acute distress  Vital Signs:   /55   Pulse 70   Temp 98 °F (36.7 °C)   Ht 180.3 cm (71\")   Wt 88.9 kg (196 lb)   BMI 27.34 kg/m²     No Known Allergies   Past medical history:  has a past medical history of Dementia (HCC), Diabetes mellitus (HCC), Hyperlipidemia, Hypertension, and Renal disorder.   Past surgical history:  has no past surgical history on file.  Personal history: Family history is unknown by patient.  Social history:  reports that he has never smoked. He has never used smokeless tobacco. He reports previous alcohol use. He reports that he does not use drugs.    Review of Systems    No change from before    Physical Exam  Constitutional:       General: He is not in acute distress.     Appearance: Normal appearance. He is normal weight. He is not ill-appearing or toxic-appearing.   HENT:      Head: Normocephalic and atraumatic.   Abdominal:      Palpations: There is no mass.      Tenderness: There is no abdominal tenderness. There is no right CVA tenderness or left CVA tenderness.   Genitourinary:     Penis: Normal.       Testes: Normal.      Comments: Patient has a Agustin catheter which is draining in his pants  Skin:     General: Skin is warm.      Coloration: Skin is not jaundiced.   Neurological:      General: No focal deficit present.      Mental Status: He is alert and oriented to person, place, and time.      Motor: No weakness.      Gait: Gait normal.   Psychiatric:         Mood and Affect: Mood normal.         Behavior: Behavior " normal.         Thought Content: Thought content normal.         Judgment: Judgment normal.        Result Review :                 Assessment and Plan    Diagnoses and all orders for this visit:    1. Urinary retention (Primary)  -     POC Urinalysis Dipstick    2. Neurogenic bladder      I have removed his Agustin catheter and more than likely he will go into retention again we will probably have to be admitted in the hospital may be sending him to nursing home.  Patient does have dementia and does not cooperate with his significant other.  We will recheck him in 6 months time  Brief Urine Lab Results  (Last result in the past 365 days)      Color   Clarity   Blood   Leuk Est   Nitrite   Protein   CREAT   Urine HCG        06/24/22 1619 Yellow   Clear   Trace   Small (1+)   Positive   Trace                  Follow Up   No follow-ups on file.  Patient was given instructions and counseling regarding his condition or for health maintenance advice. Please see specific information pulled into the AVS if appropriate.     Anette Wu MD

## 2022-06-26 LAB — BACTERIA SPEC AEROBE CULT: ABNORMAL

## 2022-06-27 DIAGNOSIS — N30.00 ACUTE CYSTITIS WITHOUT HEMATURIA: Primary | ICD-10-CM

## 2022-06-27 RX ORDER — NITROFURANTOIN 25; 75 MG/1; MG/1
100 CAPSULE ORAL 2 TIMES DAILY
Qty: 14 CAPSULE | Refills: 0 | Status: SHIPPED | OUTPATIENT
Start: 2022-06-27

## 2022-12-09 ENCOUNTER — TELEPHONE (OUTPATIENT)
Dept: UROLOGY | Facility: CLINIC | Age: 87
End: 2022-12-09

## 2022-12-09 NOTE — TELEPHONE ENCOUNTER
PT ALEXIS'D HIS UDS THAT DR MOSHER SCHEDULED FOR HIM, CALLED AND SPOKE TO SAE AT HIS OFFICE AND LET HER KNOW THAT PT JUDD/SOFIE

## 2023-01-03 ENCOUNTER — OFFICE VISIT (OUTPATIENT)
Dept: UROLOGY | Facility: CLINIC | Age: 88
End: 2023-01-03
Payer: MEDICARE

## 2023-01-03 DIAGNOSIS — Z53.21 PATIENT LEFT WITHOUT BEING SEEN: Primary | ICD-10-CM

## 2023-02-09 NOTE — PROGRESS NOTES
The patient left the office before care was provided and did not complete the visit PT WAS FEELING UNWELL AT THE TIME AND CHOSE NOT TO BE SEEN AND TO COME BACK AT A LATER DATE..

## 2023-09-18 ENCOUNTER — HOSPITAL ENCOUNTER (INPATIENT)
Facility: HOSPITAL | Age: 88
LOS: 5 days | Discharge: HOME HEALTH | DRG: 811 | End: 2023-09-23
Attending: EMERGENCY MEDICINE | Admitting: FAMILY MEDICINE
Payer: MEDICARE

## 2023-09-18 ENCOUNTER — APPOINTMENT (OUTPATIENT)
Dept: GENERAL RADIOLOGY | Facility: HOSPITAL | Age: 88
DRG: 811 | End: 2023-09-18
Payer: MEDICARE

## 2023-09-18 DIAGNOSIS — D50.8 OTHER IRON DEFICIENCY ANEMIA: ICD-10-CM

## 2023-09-18 DIAGNOSIS — K21.9 GASTROESOPHAGEAL REFLUX DISEASE WITHOUT ESOPHAGITIS: ICD-10-CM

## 2023-09-18 DIAGNOSIS — Z78.9 DECREASED ACTIVITIES OF DAILY LIVING (ADL): ICD-10-CM

## 2023-09-18 DIAGNOSIS — R26.2 DIFFICULTY IN WALKING: ICD-10-CM

## 2023-09-18 DIAGNOSIS — D64.9 SYMPTOMATIC ANEMIA: Primary | ICD-10-CM

## 2023-09-18 DIAGNOSIS — N18.4 CHRONIC KIDNEY DISEASE, STAGE IV (SEVERE): ICD-10-CM

## 2023-09-18 LAB
ABO GROUP BLD: NORMAL
ALBUMIN SERPL-MCNC: 4.4 G/DL (ref 3.5–5.2)
ALBUMIN/GLOB SERPL: 1.6 G/DL
ALP SERPL-CCNC: 88 U/L (ref 39–117)
ALT SERPL W P-5'-P-CCNC: 13 U/L (ref 1–41)
ANION GAP SERPL CALCULATED.3IONS-SCNC: 13.3 MMOL/L (ref 5–15)
APTT PPP: 37.2 SECONDS (ref 24.2–34.2)
AST SERPL-CCNC: 14 U/L (ref 1–40)
BASOPHILS # BLD AUTO: 0.08 10*3/MM3 (ref 0–0.2)
BASOPHILS NFR BLD AUTO: 1.3 % (ref 0–1.5)
BILIRUB SERPL-MCNC: 0.9 MG/DL (ref 0–1.2)
BLD GP AB SCN SERPL QL: NEGATIVE
BUN SERPL-MCNC: 52 MG/DL (ref 8–23)
BUN/CREAT SERPL: 21.6 (ref 7–25)
C3 FRG RBC-MCNC: NORMAL
CALCIUM SPEC-SCNC: 9 MG/DL (ref 8.6–10.5)
CHLORIDE SERPL-SCNC: 109 MMOL/L (ref 98–107)
CO2 SERPL-SCNC: 17.7 MMOL/L (ref 22–29)
CREAT SERPL-MCNC: 2.41 MG/DL (ref 0.76–1.27)
D-LACTATE SERPL-SCNC: 2 MMOL/L (ref 0.5–2)
DEPRECATED RDW RBC AUTO: 89 FL (ref 37–54)
EGFRCR SERPLBLD CKD-EPI 2021: 25.3 ML/MIN/1.73
EOSINOPHIL # BLD AUTO: 0.07 10*3/MM3 (ref 0–0.4)
EOSINOPHIL NFR BLD AUTO: 1.1 % (ref 0.3–6.2)
ERYTHROCYTE [DISTWIDTH] IN BLOOD BY AUTOMATED COUNT: 24.2 % (ref 12.3–15.4)
GLOBULIN UR ELPH-MCNC: 2.8 GM/DL
GLUCOSE SERPL-MCNC: 234 MG/DL (ref 65–99)
HCT VFR BLD AUTO: 17.4 % (ref 37.5–51)
HEMOCCULT STL QL IA: NEGATIVE
HGB BLD-MCNC: 5.7 G/DL (ref 13–17.7)
HOLD SPECIMEN: NORMAL
HOLD SPECIMEN: NORMAL
HYPOCHROMIA BLD QL: NORMAL
IMM GRANULOCYTES # BLD AUTO: 0.05 10*3/MM3 (ref 0–0.05)
IMM GRANULOCYTES NFR BLD AUTO: 0.8 % (ref 0–0.5)
INR PPP: 1.36 (ref 0.86–1.15)
LARGE PLATELETS: NORMAL
LYMPHOCYTES # BLD AUTO: 1.46 10*3/MM3 (ref 0.7–3.1)
LYMPHOCYTES NFR BLD AUTO: 23.6 % (ref 19.6–45.3)
MACROCYTES BLD QL SMEAR: NORMAL
MCH RBC QN AUTO: 33.5 PG (ref 26.6–33)
MCHC RBC AUTO-ENTMCNC: 32.8 G/DL (ref 31.5–35.7)
MCV RBC AUTO: 102.4 FL (ref 79–97)
MICROCYTES BLD QL: NORMAL
MONOCYTES # BLD AUTO: 0.35 10*3/MM3 (ref 0.1–0.9)
MONOCYTES NFR BLD AUTO: 5.7 % (ref 5–12)
NEUTROPHILS NFR BLD AUTO: 4.18 10*3/MM3 (ref 1.7–7)
NEUTROPHILS NFR BLD AUTO: 67.5 % (ref 42.7–76)
NRBC BLD AUTO-RTO: 0.3 /100 WBC (ref 0–0.2)
NT-PROBNP SERPL-MCNC: 8695 PG/ML (ref 0–1800)
OVALOCYTES BLD QL SMEAR: NORMAL
PLATELET # BLD AUTO: 105 10*3/MM3 (ref 140–450)
PMV BLD AUTO: 11.5 FL (ref 6–12)
POLYCHROMASIA BLD QL SMEAR: NORMAL
POTASSIUM SERPL-SCNC: 4.8 MMOL/L (ref 3.5–5.2)
PROT SERPL-MCNC: 7.2 G/DL (ref 6–8.5)
PROTHROMBIN TIME: 16.8 SECONDS (ref 11.8–14.9)
RBC # BLD AUTO: 1.7 10*6/MM3 (ref 4.14–5.8)
RH BLD: POSITIVE
SMALL PLATELETS BLD QL SMEAR: NORMAL
SODIUM SERPL-SCNC: 140 MMOL/L (ref 136–145)
T&S EXPIRATION DATE: NORMAL
TROPONIN T SERPL HS-MCNC: 29 NG/L
TSH SERPL DL<=0.05 MIU/L-ACNC: 1.65 UIU/ML (ref 0.27–4.2)
WBC MORPH BLD: NORMAL
WBC NRBC COR # BLD: 6.19 10*3/MM3 (ref 3.4–10.8)
WHOLE BLOOD HOLD COAG: NORMAL
WHOLE BLOOD HOLD SPECIMEN: NORMAL

## 2023-09-18 PROCEDURE — 83880 ASSAY OF NATRIURETIC PEPTIDE: CPT | Performed by: EMERGENCY MEDICINE

## 2023-09-18 PROCEDURE — 84484 ASSAY OF TROPONIN QUANT: CPT | Performed by: EMERGENCY MEDICINE

## 2023-09-18 PROCEDURE — 81003 URINALYSIS AUTO W/O SCOPE: CPT | Performed by: EMERGENCY MEDICINE

## 2023-09-18 PROCEDURE — 85025 COMPLETE CBC W/AUTO DIFF WBC: CPT | Performed by: EMERGENCY MEDICINE

## 2023-09-18 PROCEDURE — 85730 THROMBOPLASTIN TIME PARTIAL: CPT | Performed by: EMERGENCY MEDICINE

## 2023-09-18 PROCEDURE — 71045 X-RAY EXAM CHEST 1 VIEW: CPT

## 2023-09-18 PROCEDURE — 84443 ASSAY THYROID STIM HORMONE: CPT | Performed by: EMERGENCY MEDICINE

## 2023-09-18 PROCEDURE — 86850 RBC ANTIBODY SCREEN: CPT | Performed by: EMERGENCY MEDICINE

## 2023-09-18 PROCEDURE — 86901 BLOOD TYPING SEROLOGIC RH(D): CPT | Performed by: EMERGENCY MEDICINE

## 2023-09-18 PROCEDURE — 83615 LACTATE (LD) (LDH) ENZYME: CPT | Performed by: INTERNAL MEDICINE

## 2023-09-18 PROCEDURE — 82274 ASSAY TEST FOR BLOOD FECAL: CPT | Performed by: EMERGENCY MEDICINE

## 2023-09-18 PROCEDURE — 99285 EMERGENCY DEPT VISIT HI MDM: CPT

## 2023-09-18 PROCEDURE — 85007 BL SMEAR W/DIFF WBC COUNT: CPT | Performed by: EMERGENCY MEDICINE

## 2023-09-18 PROCEDURE — 36415 COLL VENOUS BLD VENIPUNCTURE: CPT

## 2023-09-18 PROCEDURE — 85610 PROTHROMBIN TIME: CPT | Performed by: EMERGENCY MEDICINE

## 2023-09-18 PROCEDURE — 93010 ELECTROCARDIOGRAM REPORT: CPT | Performed by: INTERNAL MEDICINE

## 2023-09-18 PROCEDURE — 80053 COMPREHEN METABOLIC PANEL: CPT | Performed by: EMERGENCY MEDICINE

## 2023-09-18 PROCEDURE — 93005 ELECTROCARDIOGRAM TRACING: CPT | Performed by: EMERGENCY MEDICINE

## 2023-09-18 PROCEDURE — 86900 BLOOD TYPING SEROLOGIC ABO: CPT | Performed by: EMERGENCY MEDICINE

## 2023-09-18 PROCEDURE — 93005 ELECTROCARDIOGRAM TRACING: CPT

## 2023-09-18 PROCEDURE — 83605 ASSAY OF LACTIC ACID: CPT | Performed by: EMERGENCY MEDICINE

## 2023-09-18 RX ORDER — SODIUM CHLORIDE 0.9 % (FLUSH) 0.9 %
10 SYRINGE (ML) INJECTION AS NEEDED
Status: DISCONTINUED | OUTPATIENT
Start: 2023-09-18 | End: 2023-09-23 | Stop reason: HOSPADM

## 2023-09-19 PROBLEM — Z86.59 HISTORY OF DEMENTIA: Status: ACTIVE | Noted: 2023-09-19

## 2023-09-19 PROBLEM — E11.9 TYPE 2 DIABETES MELLITUS: Status: ACTIVE | Noted: 2022-05-17

## 2023-09-19 PROBLEM — N18.1 CKD (CHRONIC KIDNEY DISEASE) STAGE 1, GFR 90 ML/MIN OR GREATER: Status: RESOLVED | Noted: 2023-09-19 | Resolved: 2023-09-19

## 2023-09-19 PROBLEM — I10 ESSENTIAL HYPERTENSION: Status: ACTIVE | Noted: 2023-09-19

## 2023-09-19 PROBLEM — N18.4 CHRONIC KIDNEY DISEASE, STAGE IV (SEVERE): Status: ACTIVE | Noted: 2023-09-19

## 2023-09-19 PROBLEM — I25.10 CAD (CORONARY ARTERY DISEASE): Status: ACTIVE | Noted: 2023-09-19

## 2023-09-19 PROBLEM — N18.1 CKD (CHRONIC KIDNEY DISEASE) STAGE 1, GFR 90 ML/MIN OR GREATER: Status: ACTIVE | Noted: 2023-09-19

## 2023-09-19 LAB
ABO GROUP BLD: NORMAL
ANION GAP SERPL CALCULATED.3IONS-SCNC: 15.1 MMOL/L (ref 5–15)
ANISOCYTOSIS BLD QL: NORMAL
BASOPHILS # BLD AUTO: 0.1 10*3/MM3 (ref 0–0.2)
BASOPHILS NFR BLD AUTO: 1.4 % (ref 0–1.5)
BILIRUB UR QL STRIP: NEGATIVE
BUN SERPL-MCNC: 45 MG/DL (ref 8–23)
BUN/CREAT SERPL: 21 (ref 7–25)
BURR CELLS BLD QL SMEAR: NORMAL
C3 FRG RBC-MCNC: NORMAL
CALCIUM SPEC-SCNC: 9 MG/DL (ref 8.6–10.5)
CHLORIDE SERPL-SCNC: 108 MMOL/L (ref 98–107)
CLARITY UR: CLEAR
CO2 SERPL-SCNC: 14.9 MMOL/L (ref 22–29)
COLOR UR: YELLOW
CREAT SERPL-MCNC: 2.14 MG/DL (ref 0.76–1.27)
DEPRECATED RDW RBC AUTO: 74.2 FL (ref 37–54)
EGFRCR SERPLBLD CKD-EPI 2021: 29.2 ML/MIN/1.73
ELLIPTOCYTES BLD QL SMEAR: NORMAL
EOSINOPHIL # BLD AUTO: 0.08 10*3/MM3 (ref 0–0.4)
EOSINOPHIL NFR BLD AUTO: 1.1 % (ref 0.3–6.2)
ERYTHROCYTE [DISTWIDTH] IN BLOOD BY AUTOMATED COUNT: 21.2 % (ref 12.3–15.4)
FERRITIN SERPL-MCNC: 835.6 NG/ML (ref 30–400)
FOLATE SERPL-MCNC: 13.3 NG/ML (ref 4.78–24.2)
GEN 5 2HR TROPONIN T REFLEX: 28 NG/L
GLUCOSE BLDC GLUCOMTR-MCNC: 135 MG/DL (ref 70–99)
GLUCOSE BLDC GLUCOMTR-MCNC: 162 MG/DL (ref 70–99)
GLUCOSE BLDC GLUCOMTR-MCNC: 163 MG/DL (ref 70–99)
GLUCOSE BLDC GLUCOMTR-MCNC: 176 MG/DL (ref 70–99)
GLUCOSE SERPL-MCNC: 189 MG/DL (ref 65–99)
GLUCOSE UR STRIP-MCNC: NEGATIVE MG/DL
HAPTOGLOB SERPL-MCNC: 143 MG/DL (ref 30–200)
HCT VFR BLD AUTO: 28.7 % (ref 37.5–51)
HEMOCCULT STL QL IA: NEGATIVE
HGB BLD-MCNC: 8.9 G/DL (ref 13–17.7)
HGB UR QL STRIP.AUTO: NEGATIVE
HYPOCHROMIA BLD QL: NORMAL
IMM GRANULOCYTES # BLD AUTO: 0.15 10*3/MM3 (ref 0–0.05)
IMM GRANULOCYTES NFR BLD AUTO: 2 % (ref 0–0.5)
IRON 24H UR-MRATE: 221 MCG/DL (ref 59–158)
IRON SATN MFR SERPL: 84 % (ref 20–50)
KETONES UR QL STRIP: NEGATIVE
LARGE PLATELETS: NORMAL
LDH SERPL-CCNC: 183 U/L (ref 135–225)
LEUKOCYTE ESTERASE UR QL STRIP.AUTO: NEGATIVE
LYMPHOCYTES # BLD AUTO: 1.34 10*3/MM3 (ref 0.7–3.1)
LYMPHOCYTES NFR BLD AUTO: 18.3 % (ref 19.6–45.3)
MACROCYTES BLD QL SMEAR: NORMAL
MCH RBC QN AUTO: 30.7 PG (ref 26.6–33)
MCHC RBC AUTO-ENTMCNC: 31 G/DL (ref 31.5–35.7)
MCV RBC AUTO: 99 FL (ref 79–97)
MICROCYTES BLD QL: NORMAL
MONOCYTES # BLD AUTO: 0.31 10*3/MM3 (ref 0.1–0.9)
MONOCYTES NFR BLD AUTO: 4.2 % (ref 5–12)
NEUTROPHILS NFR BLD AUTO: 5.35 10*3/MM3 (ref 1.7–7)
NEUTROPHILS NFR BLD AUTO: 73 % (ref 42.7–76)
NITRITE UR QL STRIP: NEGATIVE
NRBC BLD AUTO-RTO: 0.8 /100 WBC (ref 0–0.2)
PH UR STRIP.AUTO: <=5 [PH] (ref 5–8)
PLATELET # BLD AUTO: 92 10*3/MM3 (ref 140–450)
PMV BLD AUTO: 11.1 FL (ref 6–12)
POIKILOCYTOSIS BLD QL SMEAR: NORMAL
POLYCHROMASIA BLD QL SMEAR: NORMAL
POTASSIUM SERPL-SCNC: 4.3 MMOL/L (ref 3.5–5.2)
PROT UR QL STRIP: ABNORMAL
RBC # BLD AUTO: 2.9 10*6/MM3 (ref 4.14–5.8)
RETICS # AUTO: 0.05 10*6/MM3 (ref 0.02–0.13)
RETICS/RBC NFR AUTO: 1.58 % (ref 0.7–1.9)
RH BLD: POSITIVE
SMALL PLATELETS BLD QL SMEAR: NORMAL
SODIUM SERPL-SCNC: 138 MMOL/L (ref 136–145)
SP GR UR STRIP: 1.02 (ref 1–1.03)
TIBC SERPL-MCNC: 264 MCG/DL (ref 298–536)
TRANSFERRIN SERPL-MCNC: 177 MG/DL (ref 200–360)
TROPONIN T DELTA: -1 NG/L
UROBILINOGEN UR QL STRIP: ABNORMAL
VIT B12 BLD-MCNC: >2000 PG/ML (ref 211–946)
WBC MORPH BLD: NORMAL
WBC NRBC COR # BLD: 7.33 10*3/MM3 (ref 3.4–10.8)

## 2023-09-19 PROCEDURE — 80048 BASIC METABOLIC PNL TOTAL CA: CPT | Performed by: FAMILY MEDICINE

## 2023-09-19 PROCEDURE — 85025 COMPLETE CBC W/AUTO DIFF WBC: CPT | Performed by: FAMILY MEDICINE

## 2023-09-19 PROCEDURE — 84155 ASSAY OF PROTEIN SERUM: CPT | Performed by: INTERNAL MEDICINE

## 2023-09-19 PROCEDURE — 85007 BL SMEAR W/DIFF WBC COUNT: CPT | Performed by: FAMILY MEDICINE

## 2023-09-19 PROCEDURE — 36430 TRANSFUSION BLD/BLD COMPNT: CPT

## 2023-09-19 PROCEDURE — 82948 REAGENT STRIP/BLOOD GLUCOSE: CPT

## 2023-09-19 PROCEDURE — 83540 ASSAY OF IRON: CPT | Performed by: FAMILY MEDICINE

## 2023-09-19 PROCEDURE — 82607 VITAMIN B-12: CPT | Performed by: FAMILY MEDICINE

## 2023-09-19 PROCEDURE — 83010 ASSAY OF HAPTOGLOBIN QUANT: CPT | Performed by: INTERNAL MEDICINE

## 2023-09-19 PROCEDURE — 84466 ASSAY OF TRANSFERRIN: CPT | Performed by: FAMILY MEDICINE

## 2023-09-19 PROCEDURE — 25010000002 FUROSEMIDE PER 20 MG: Performed by: FAMILY MEDICINE

## 2023-09-19 PROCEDURE — 82784 ASSAY IGA/IGD/IGG/IGM EACH: CPT | Performed by: INTERNAL MEDICINE

## 2023-09-19 PROCEDURE — 99223 1ST HOSP IP/OBS HIGH 75: CPT | Performed by: FAMILY MEDICINE

## 2023-09-19 PROCEDURE — 86901 BLOOD TYPING SEROLOGIC RH(D): CPT

## 2023-09-19 PROCEDURE — 88271 CYTOGENETICS DNA PROBE: CPT | Performed by: INTERNAL MEDICINE

## 2023-09-19 PROCEDURE — 84165 PROTEIN E-PHORESIS SERUM: CPT | Performed by: INTERNAL MEDICINE

## 2023-09-19 PROCEDURE — 88275 CYTOGENETICS 100-300: CPT

## 2023-09-19 PROCEDURE — 82746 ASSAY OF FOLIC ACID SERUM: CPT | Performed by: FAMILY MEDICINE

## 2023-09-19 PROCEDURE — 86900 BLOOD TYPING SEROLOGIC ABO: CPT

## 2023-09-19 PROCEDURE — P9016 RBC LEUKOCYTES REDUCED: HCPCS

## 2023-09-19 PROCEDURE — 88185 FLOWCYTOMETRY/TC ADD-ON: CPT | Performed by: INTERNAL MEDICINE

## 2023-09-19 PROCEDURE — 36415 COLL VENOUS BLD VENIPUNCTURE: CPT | Performed by: FAMILY MEDICINE

## 2023-09-19 PROCEDURE — 88184 FLOWCYTOMETRY/ TC 1 MARKER: CPT

## 2023-09-19 PROCEDURE — 86334 IMMUNOFIX E-PHORESIS SERUM: CPT | Performed by: INTERNAL MEDICINE

## 2023-09-19 PROCEDURE — 63710000001 INSULIN LISPRO (HUMAN) PER 5 UNITS: Performed by: FAMILY MEDICINE

## 2023-09-19 PROCEDURE — 86923 COMPATIBILITY TEST ELECTRIC: CPT

## 2023-09-19 PROCEDURE — 82274 ASSAY TEST FOR BLOOD FECAL: CPT | Performed by: INTERNAL MEDICINE

## 2023-09-19 PROCEDURE — 82728 ASSAY OF FERRITIN: CPT | Performed by: FAMILY MEDICINE

## 2023-09-19 PROCEDURE — 85045 AUTOMATED RETICULOCYTE COUNT: CPT | Performed by: FAMILY MEDICINE

## 2023-09-19 RX ORDER — ROSUVASTATIN CALCIUM 40 MG/1
40 TABLET, COATED ORAL DAILY
COMMUNITY

## 2023-09-19 RX ORDER — BISACODYL 10 MG
10 SUPPOSITORY, RECTAL RECTAL DAILY PRN
Status: DISCONTINUED | OUTPATIENT
Start: 2023-09-19 | End: 2023-09-23 | Stop reason: HOSPADM

## 2023-09-19 RX ORDER — SODIUM CHLORIDE 9 MG/ML
40 INJECTION, SOLUTION INTRAVENOUS AS NEEDED
Status: DISCONTINUED | OUTPATIENT
Start: 2023-09-19 | End: 2023-09-23 | Stop reason: HOSPADM

## 2023-09-19 RX ORDER — FAMOTIDINE 20 MG/1
20 TABLET, FILM COATED ORAL
Status: DISCONTINUED | OUTPATIENT
Start: 2023-09-19 | End: 2023-09-20

## 2023-09-19 RX ORDER — TAMSULOSIN HYDROCHLORIDE 0.4 MG/1
0.4 CAPSULE ORAL DAILY
Status: DISCONTINUED | OUTPATIENT
Start: 2023-09-19 | End: 2023-09-23 | Stop reason: HOSPADM

## 2023-09-19 RX ORDER — SODIUM BICARBONATE 650 MG/1
1300 TABLET ORAL 2 TIMES DAILY
Status: DISCONTINUED | OUTPATIENT
Start: 2023-09-19 | End: 2023-09-23 | Stop reason: HOSPADM

## 2023-09-19 RX ORDER — AMLODIPINE BESYLATE 10 MG/1
10 TABLET ORAL DAILY
COMMUNITY

## 2023-09-19 RX ORDER — HYDRALAZINE HYDROCHLORIDE 20 MG/ML
10 INJECTION INTRAMUSCULAR; INTRAVENOUS EVERY 6 HOURS PRN
Status: DISCONTINUED | OUTPATIENT
Start: 2023-09-19 | End: 2023-09-23 | Stop reason: HOSPADM

## 2023-09-19 RX ORDER — INSULIN LISPRO 100 [IU]/ML
2-7 INJECTION, SOLUTION INTRAVENOUS; SUBCUTANEOUS
Status: DISCONTINUED | OUTPATIENT
Start: 2023-09-19 | End: 2023-09-23 | Stop reason: HOSPADM

## 2023-09-19 RX ORDER — OINTMENT BASE NO.104
1 OINTMENT (GRAM) TOPICAL 2 TIMES DAILY PRN
COMMUNITY

## 2023-09-19 RX ORDER — ONDANSETRON 2 MG/ML
4 INJECTION INTRAMUSCULAR; INTRAVENOUS EVERY 6 HOURS PRN
Status: DISCONTINUED | OUTPATIENT
Start: 2023-09-19 | End: 2023-09-23 | Stop reason: HOSPADM

## 2023-09-19 RX ORDER — AMOXICILLIN 250 MG
2 CAPSULE ORAL 2 TIMES DAILY
Status: DISCONTINUED | OUTPATIENT
Start: 2023-09-19 | End: 2023-09-23 | Stop reason: HOSPADM

## 2023-09-19 RX ORDER — TRAZODONE HYDROCHLORIDE 50 MG/1
25 TABLET ORAL NIGHTLY
COMMUNITY

## 2023-09-19 RX ORDER — DEXTROSE MONOHYDRATE 25 G/50ML
25 INJECTION, SOLUTION INTRAVENOUS
Status: DISCONTINUED | OUTPATIENT
Start: 2023-09-19 | End: 2023-09-23 | Stop reason: HOSPADM

## 2023-09-19 RX ORDER — POLYETHYLENE GLYCOL 3350 17 G/17G
17 POWDER, FOR SOLUTION ORAL DAILY PRN
Status: DISCONTINUED | OUTPATIENT
Start: 2023-09-19 | End: 2023-09-23 | Stop reason: HOSPADM

## 2023-09-19 RX ORDER — SODIUM CHLORIDE 0.9 % (FLUSH) 0.9 %
10 SYRINGE (ML) INJECTION AS NEEDED
Status: DISCONTINUED | OUTPATIENT
Start: 2023-09-19 | End: 2023-09-23 | Stop reason: HOSPADM

## 2023-09-19 RX ORDER — BISACODYL 5 MG/1
5 TABLET, DELAYED RELEASE ORAL DAILY PRN
Status: DISCONTINUED | OUTPATIENT
Start: 2023-09-19 | End: 2023-09-23 | Stop reason: HOSPADM

## 2023-09-19 RX ORDER — NICOTINE POLACRILEX 4 MG
15 LOZENGE BUCCAL
Status: DISCONTINUED | OUTPATIENT
Start: 2023-09-19 | End: 2023-09-23 | Stop reason: HOSPADM

## 2023-09-19 RX ORDER — FUROSEMIDE 10 MG/ML
40 INJECTION INTRAMUSCULAR; INTRAVENOUS ONCE
Status: COMPLETED | OUTPATIENT
Start: 2023-09-19 | End: 2023-09-19

## 2023-09-19 RX ORDER — LIDOCAINE 50 MG/G
1 PATCH TOPICAL DAILY PRN
COMMUNITY

## 2023-09-19 RX ORDER — METOPROLOL SUCCINATE 100 MG/1
50 TABLET, EXTENDED RELEASE ORAL DAILY
COMMUNITY
End: 2023-09-23 | Stop reason: HOSPADM

## 2023-09-19 RX ORDER — METOPROLOL SUCCINATE 25 MG/1
25 TABLET, EXTENDED RELEASE ORAL DAILY
Status: DISCONTINUED | OUTPATIENT
Start: 2023-09-19 | End: 2023-09-23 | Stop reason: HOSPADM

## 2023-09-19 RX ORDER — SODIUM CHLORIDE 0.9 % (FLUSH) 0.9 %
10 SYRINGE (ML) INJECTION EVERY 12 HOURS SCHEDULED
Status: DISCONTINUED | OUTPATIENT
Start: 2023-09-19 | End: 2023-09-23 | Stop reason: HOSPADM

## 2023-09-19 RX ORDER — LISINOPRIL 10 MG/1
5 TABLET ORAL DAILY
COMMUNITY

## 2023-09-19 RX ADMIN — INSULIN LISPRO 2 UNITS: 100 INJECTION, SOLUTION INTRAVENOUS; SUBCUTANEOUS at 21:30

## 2023-09-19 RX ADMIN — INSULIN LISPRO 2 UNITS: 100 INJECTION, SOLUTION INTRAVENOUS; SUBCUTANEOUS at 11:43

## 2023-09-19 RX ADMIN — TAMSULOSIN HYDROCHLORIDE 0.4 MG: 0.4 CAPSULE ORAL at 11:39

## 2023-09-19 RX ADMIN — FAMOTIDINE 20 MG: 20 TABLET ORAL at 17:50

## 2023-09-19 RX ADMIN — INSULIN LISPRO 2 UNITS: 100 INJECTION, SOLUTION INTRAVENOUS; SUBCUTANEOUS at 08:15

## 2023-09-19 RX ADMIN — Medication 10 ML: at 02:24

## 2023-09-19 RX ADMIN — SENNOSIDES AND DOCUSATE SODIUM 2 TABLET: 50; 8.6 TABLET ORAL at 02:49

## 2023-09-19 RX ADMIN — FUROSEMIDE 40 MG: 10 INJECTION, SOLUTION INTRAMUSCULAR; INTRAVENOUS at 08:53

## 2023-09-19 RX ADMIN — SODIUM BICARBONATE 650 MG TABLET 1300 MG: at 21:30

## 2023-09-19 NOTE — CONSULTS
[facility]   Consult Note    Patient Name: Selvin Ferguson  : 1935  MRN: [id number]  Primary Care Physician:  Kayla Renee APRN  Date of admission: 2023    Subjective   Subjective     Reason for Consult: Anemia and thrombocytopenia    HPI: Patient states that he does not remember when he came to the hospital and why he came to the hospital most likely his lady friend brought him because he was probably weak he could not remember the details of his old history either he does not recall if he had any loss of weight loss of appetite but at the moment he said he does not feel like eating he has been a smoker but he has never been an alcoholic    Selvin Ferguson is a 87 y.o. male patient states that he has been just feeling weak otherwise no other symptoms he has been already transfused he came with a hemoglobin of 5.7 platelet counts are low at 92,000 at this time possible to splenomegaly and cirrhosis will have to be ruled out    Review of Systems   All systems were reviewed and negative except for: Reviewed    Personal History     Past Medical History:   Diagnosis Date   • Dementia    • Diabetes mellitus    • Hyperlipidemia    • Hypertension    • Renal disorder        Past Surgical History:   Procedure Laterality Date   • CATARACT EXTRACTION     • COLONOSCOPY     • CORONARY ARTERY BYPASS GRAFT     • CYSTOSCOPY     • PROSTATE SURGERY         Family History: Family history is unknown by patient. Otherwise pertinent FHx was reviewed and not pertinent to current issue.    Social History:  reports that he quit smoking about 10 years ago. His smoking use included cigarettes. He smoked an average of 1 pack per day. He has never used smokeless tobacco. He reports that he does not currently use alcohol. He reports that he does not use drugs.    Home Medications:  Darbepoetin New, amLODIPine, aspirin, clopidogrel, fexofenadine, fluticasone, hydrophilic ointment, lidocaine, lisinopril, metoprolol  succinate XL, nitroglycerin, pantoprazole, rosuvastatin, and traZODone      Allergies:  No Known Allergies    Objective   Objective     Vitals:   Temp:  [97.3 °F (36.3 °C)-98.6 °F (37 °C)] 98.5 °F (36.9 °C)  Heart Rate:  [64-75] 75  Resp:  [18-21] 18  BP: (104-161)/(42-62) 161/62  Physical Exam    Constitutional: Awake, alert   Eyes: PERRLA, sclerae anicteric, no conjunctival injection   HENT: NCAT, mucous membranes moist   Neck: Supple, no thyromegaly, no lymphadenopathy, trachea midline   Respiratory: Clear to auscultation bilaterally, nonlabored respirations    Cardiovascular: RRR, no murmurs, rubs, or gallops, palpable pedal pulses bilaterally   Gastrointestinal: Positive bowel sounds, soft, nontender, nondistended   Musculoskeletal: No bilateral ankle edema, no clubbing or cyanosis to extremities   Psychiatric: Appropriate affect, cooperative   Neurologic: Oriented x 3, strength symmetric in all extremities, Cranial Nerves grossly intact to confrontation, speech clear   Skin: No rashes   Patient is alert oriented knows what is going on however and his memory does not seem to be all right looks like that he has had a diagnosis of some early dementia  Result Review    Result Review:  I have personally reviewed the results from the time of this admission to 9/19/2023 17:51 EDT and agree with these findings:  [x]  Laboratory anemia and thrombocytopenia  []  Microbiology  []  Radiology  []  EKG/Telemetry   []  Cardiology/Vascular   []  Pathology  []  Old records  []  Other:  Most notable findings include: Reviewed    Assessment & Plan   Assessment / Plan     Brief Patient Summary:  Selvin Ferguson is a 87 y.o. male who thrombocytopenia possibility of splenomegaly will have to be ruled out    Active Hospital Problems:  Active Hospital Problems    Diagnosis    • **Symptomatic anemia    • CKD (chronic kidney disease) stage 1, GFR 90 ml/min or greater    • Essential hypertension    • CAD (coronary artery disease)     • History of dementia    • Type 2 diabetes mellitus        Plan:   We will get a CT scan of the chest abdomen leukemia lymphoma flow and myelodysplastic syndrome FISH thrombocytopenia elevated MCV and rest of the lab work appears to be stable possibility of myelodysplastic syndrome will have to be ruled out        Electronically signed by Tristin Reece MD, 09/19/23, 5:51 PM EDT.    Part of this note may be an electronic transcription/translation of spoken language to printed text using the Dragon Dictation System.

## 2023-09-19 NOTE — H&P
[facility]   HISTORY AND PHYSICAL    Patient Name: Selvin Ferguson  : 1935  MRN: [id number]  Primary Care Physician:  Kayla Renee APRN  Date of admission: 2023    Subjective   Subjective     Chief Complaint: Anemia    HPI:    Selvin Ferguson is a 87 y.o. male with past medical history of diabetes, hypertension, CKD, advanced dementia, CAD status post CABG and GERD [facility].  When seen patient had no complaints but does have advanced dementia so history was taken via chart review and physician handoff.  Family did state that he was more tired and less active than usual.  [facility] where he was found to have a significantly low hemoglobin level and thus sent to the ED for further management.  In the ED vitals were all within normal limits on arrival.  Labs did show that he had a hemoglobin level of 5.7 with hematocrit of 17.4 as well as a pro BNP level of 8695 and mildly elevated INR.  Main labs are relatively unremarkable including a normal urinalysis and fecal occult blood.  2 units of PRBCs in the ED.  Chest x-ray showed pulmonary edema/congestion.  Patient was admitted for further evaluation and treatment        Review of Systems   All systems were reviewed and negative except for: As per HPI.  Patient with dementia    Personal History     Past Medical History:   Diagnosis Date   • Dementia    • Diabetes mellitus    • Hyperlipidemia    • Hypertension    • Renal disorder        Past Surgical History:   Procedure Laterality Date   • CATARACT EXTRACTION     • COLONOSCOPY     • CORONARY ARTERY BYPASS GRAFT     • CYSTOSCOPY     • PROSTATE SURGERY         Family History: Family history is unknown by patient. Otherwise pertinent FHx was reviewed and not pertinent to current issue.    Social History:  reports that he quit smoking about 10 years ago. His smoking use included cigarettes. He smoked an average of 1 pack per day.  He has never used smokeless tobacco. He reports that he does not currently use alcohol. He reports that he does not use drugs.    Home Medications:  Aspirin, acetaminophen-codeine, atorvastatin, clopidogrel, fexofenadine, fluticasone, gabapentin, glipizide, metoprolol succinate XL, nitrofurantoin (macrocrystal-monohydrate), nitroglycerin, pantoprazole, potassium chloride, and sulfamethoxazole-trimethoprim      Allergies:  No Known Allergies    Objective   Objective     Vitals:   Temp:  [98 °F (36.7 °C)-98.1 °F (36.7 °C)] 98.1 °F (36.7 °C)  Heart Rate:  [67-72] 72  Resp:  [18-21] 18  BP: (124-130)/(47-53) 130/49  Physical Exam    Constitutional: Awake, alert   Eyes: PERRLA, sclerae anicteric, no conjunctival injection   HENT: NCAT, mucous membranes moist   Neck: Supple, no thyromegaly, no lymphadenopathy, trachea midline   Respiratory: Clear to auscultation bilaterally, nonlabored respirations    Cardiovascular: RRR, no murmurs, rubs, or gallops, palpable pedal pulses bilaterally   Gastrointestinal: Positive bowel sounds, soft, nontender, nondistended   Musculoskeletal: No bilateral ankle edema, no clubbing or cyanosis to extremities   Psychiatric: Appropriate affect, cooperative   Neurologic: Oriented x 3, strength symmetric in all extremities, Cranial Nerves grossly intact to confrontation, speech clear   Skin: No rashes     Result Review    Result Review:  I have personally reviewed the results from the time of this admission to 9/19/2023 01:25 EDT and agree with these findings:  [x]  Laboratory list / accordion  []  Microbiology  [x]  Radiology  [x]  EKG/Telemetry   []  Cardiology/Vascular   []  Pathology  []  Old records  []  Other:  Most notable findings include: Severe anemia.  Elevated proBNP.  Elevated INR.  Chest x-ray relatively unremarkable      Assessment & Plan   Assessment / Plan     Brief Patient Summary:  Selvin Ferguson is a 87 y.o. male who with past medical history of diabetes, hypertension,  CKD, advanced dementia, CAD status post CABG and GERD [facility].     Active Hospital Problems:  Active Hospital Problems    Diagnosis    • **Symptomatic anemia    • CKD (chronic kidney disease) stage 1, GFR 90 ml/min or greater    • Essential hypertension    • CAD (coronary artery disease)    • History of dementia    • Type 2 diabetes mellitus      Plan:   -Symptomatic anemia  -Patient with increased fatigue and less active as per family  -Hemoglobin 5.7.  2 units of PRBC ordered  -Transfuse as needed  -Fecal occult blood negative  -MCV over 100  -Patient with history of CKD  -Anemia panel pending  -We will follow along    Diabetes  -Insulin sliding scale  -Titrate as needed    HTN  -Currently well controlled  -PRN BP meds  -Resume home meds when available  -Titrate if needed    CKD  -Patient known Hx  -Anemic  -Consult Nephrology if warranted    Elevated proBNP  -Patient with history of CAD status post CABG  -Chest x-ray with pulmonary edema  -We will give Lasix between transfusions  -Echo and further work-up if warranted    Hx CAD  Hx of Dementia    GI ppx  DVT ppx    DVT prophylaxis:  Mechanical DVT prophylaxis orders are present.    CODE STATUS: Full code     Admission Status:  I believe this patient meets observation status.      Electronically signed by Gaudencio Boyce MD, 09/19/23, 1:25 AM EDT.

## 2023-09-19 NOTE — CONSULTS
[facility]   Consult Note    Patient Name: Selvin Ferguson  : 1935  MRN: [id number]  Primary Care Physician:  Kayla Renee APRN  Referring Physician: No ref. provider found  Date of admission: 2023    Subjective   Subjective     Reason for Consult/ Chief Complaint:  anemia    HPI:  Selvin Ferguson is a 87 y.o. male  with past medical history significant for type II diabetes, dementia, hyperlipidemia, hypertension, CKD 4 with baseline creatinine 2.0-2.4.    He presented to ER today after he was advised to come to the hospital [facility]. He was found to have severe anemia.   Labs in ER resulted sodium 140, potassium 4.8, bicarb 18, BUN 52, creatinine 2.4, glucose 234, BNP 8700, WBC 6.19, hemoglobin 5.7, platelets 105.  Urinalysis trace protein.  Fecal occult blood negative.    When I see patient he is pleasantly confused but cannot remember why he came to the hospital or any of his history.  History obtained from chart review.      Review of Systems   Limited due to dementia.  Denies shortness of breath or CP    Personal History     Past Medical History:   Diagnosis Date   • Dementia    • Diabetes mellitus    • Hyperlipidemia    • Hypertension    • Renal disorder        Past Surgical History:   Procedure Laterality Date   • CATARACT EXTRACTION     • COLONOSCOPY     • CORONARY ARTERY BYPASS GRAFT     • CYSTOSCOPY     • PROSTATE SURGERY         Family History: Family history is unknown by patient. Otherwise pertinent FHx was reviewed and not pertinent to current issue.    Social History:  reports that he quit smoking about 10 years ago. His smoking use included cigarettes. He smoked an average of 1 pack per day. He has never used smokeless tobacco. He reports that he does not currently use alcohol. He reports that he does not use drugs.    Home Medications:  Darbepoetin New, amLODIPine, aspirin, clopidogrel, fexofenadine, fluticasone, hydrophilic ointment, lidocaine,  lisinopril, metoprolol succinate XL, nitroglycerin, pantoprazole, rosuvastatin, and traZODone    Allergies:  No Known Allergies    Objective    Objective     Vitals:   Temp:  [98 °F (36.7 °C)-98.5 °F (36.9 °C)] 98.3 °F (36.8 °C)  Heart Rate:  [64-75] 70  Resp:  [16-18] 18  BP: (119-161)/(42-62) 135/58    Physical Exam:             Constitutional:         Awake, alert responsive, conversant, no obvious distress.  Confused.  Poor short term memory    Eyes:                       PERRLA, sclerae anicteric, no conjunctival injection   HEENT:                   Moist mucous membranes, no nasal or eye discharge, no throat congestion   Neck:                      Supple, no thyromegaly, no lymphadenopathy, trachea midline, no elevated JVD   Respiratory:           Clear to auscultation bilaterally, nonlabored respirations    Cardiovascular:     RRR, no murmurs, rubs, or gallops, palpable pedal pulses bilaterally, trace bilateral ankle edema   Gastrointestinal:   Positive bowel sounds, soft, nontender, non-distended, no organomegaly   Musculoskeletal:  No clubbing or cyanosis to extremities, muscle wasting, joint swelling, muscle weakness   Psychiatric:              Appropriate affect, cooperative   Neurologic:            Awake alert, oriented x 1, strength symmetric in all extremities, Cranial Nerves grossly intact to confrontation, speech clear   Skin:                      No rashes, bruising, skin ulcers, petechiae or ecchymosis    Result Review    Result Review:  I have personally reviewed the results from the time of this admission to 9/20/2023 08:26 EDT and agree with these findings:  []  Laboratory  []  Microbiology  []  Radiology  []  EKG/Telemetry   []  Cardiology/Vascular   []  Pathology  []  Old records  []  Other:      Assessment & Plan   Assessment / Plan     Active Hospital Problems:  Active Hospital Problems    Diagnosis    • **Symptomatic anemia    • Essential hypertension    • CAD (coronary artery disease)    •  History of dementia    • Chronic kidney disease, stage IV (severe)    • Type 2 diabetes mellitus    • Urinary retention    • Acute renal failure superimposed on chronic kidney disease        Plan:   Patient renal function is close to its baseline we need to make sure patient does not have obstruction  Start sodium bicarbonate for metabolic acidosis  Iron profile B12 folic acid and hematology is on the case  We will follow patient very closely    Electronically signed by Annalee Kent 09/19/23, 6:07 PM EDT.

## 2023-09-19 NOTE — CONSULTS
Chief Complaint  Abnormal Lab    History of Present Illness  Selvin Ferguson is a 87 y.o. male who has past medical history consistent with early dementia, diabetes, high cholesterol, hypertension, renal insufficiency, cataract, prostate surgery presents to [facility]TH FLOOR SURGICAL TELEMETRY UNIT on referral from No ref. provider found for a gastroenterology evaluation of need severe anemia with hemoglobin about 5.  There is no history of overt active GI bleeding.  Patient denies any abdominal pain, nausea vomiting, heartburn, dysphagia, rectal bleeding, melena, hematemesis.        Labs Result Review Imaging    Past Medical History:   Diagnosis Date    Dementia     Diabetes mellitus     Hyperlipidemia     Hypertension     Renal disorder        Past Surgical History:   Procedure Laterality Date    CATARACT EXTRACTION      COLONOSCOPY      CORONARY ARTERY BYPASS GRAFT      CYSTOSCOPY      PROSTATE SURGERY           Current Facility-Administered Medications:     sennosides-docusate (PERICOLACE) 8.6-50 MG per tablet 2 tablet, 2 tablet, Oral, BID, 2 tablet at 09/19/23 0249 **AND** polyethylene glycol (MIRALAX) packet 17 g, 17 g, Oral, Daily PRN **AND** bisacodyl (DULCOLAX) EC tablet 5 mg, 5 mg, Oral, Daily PRN **AND** bisacodyl (DULCOLAX) suppository 10 mg, 10 mg, Rectal, Daily PRN, Gaudencio Boyce MD    dextrose (D50W) (25 g/50 mL) IV injection 25 g, 25 g, Intravenous, Q15 Min PRN, Gaudencio Boyce MD    dextrose (GLUTOSE) oral gel 15 g, 15 g, Oral, Q15 Min PRN, Gaudencio Boyce MD    famotidine (PEPCID) tablet 20 mg, 20 mg, Oral, BID AC, Olu Mak MD    glucagon (GLUCAGEN) injection 1 mg, 1 mg, Intramuscular, Q15 Min PRN, Gaudencio Boyce MD    hydrALAZINE (APRESOLINE) injection 10 mg, 10 mg, Intravenous, Q6H PRN, Gaudencio Boyce MD    Insulin Lispro (humaLOG) injection 2-7 Units, 2-7 Units, Subcutaneous, 4x Daily AC & at Bedtime, Gaudencio Boyce MD, 2 Units at 09/19/23 1149    metoprolol  succinate XL (TOPROL-XL) 24 hr tablet 25 mg, 25 mg, Oral, Daily, Olu Mak MD    ondansetron (ZOFRAN) injection 4 mg, 4 mg, Intravenous, Q6H PRN, Gaudencio Boyce MD    sodium bicarbonate tablet 1,300 mg, 1,300 mg, Oral, BID, Olu Mak MD    sodium chloride 0.9 % flush 10 mL, 10 mL, Intravenous, PRN, Cem Bryna MD    sodium chloride 0.9 % flush 10 mL, 10 mL, Intravenous, Q12H, Gaudencio Boyce MD, 10 mL at 09/19/23 0224    sodium chloride 0.9 % flush 10 mL, 10 mL, Intravenous, PRN, Gaudencio Boyce MD    sodium chloride 0.9 % infusion 40 mL, 40 mL, Intravenous, PRN, Gaudencio Boyce MD    tamsulosin (FLOMAX) 24 hr capsule 0.4 mg, 0.4 mg, Oral, Daily, Olu Mak MD, 0.4 mg at 09/19/23 1139     No Known Allergies    Family History   Family history unknown: Yes        Social History     Social History Narrative    Not on file   Social history negative for smoking, alcohol use, drugs    Immunization:    There is no immunization history on file for this patient.     Objective     Review of Systems 10 system review is negative except as mentioned HPI    Vital Signs:   /62   Pulse 75   Temp 98.5 °F (36.9 °C)   Resp 18   SpO2 100%       Physical Exam  Constitutional:       General: He is awake. He is not in acute distress.     Appearance: Normal appearance. He is well-developed and well-groomed.   HENT:      Head: Normocephalic and atraumatic.      Mouth/Throat:      Mouth: Mucous membranes are moist.      Comments: Ghislaine dental hygiene is good  Eyes:      General: Lids are normal.      Conjunctiva/sclera: Conjunctivae normal.      Pupils: Pupils are equal, round, and reactive to light.   Neck:      Thyroid: No thyroid mass.      Trachea: Trachea normal.   Cardiovascular:      Rate and Rhythm: Normal rate and regular rhythm.      Heart sounds: Normal heart sounds.   Pulmonary:      Effort: Pulmonary effort is normal.      Breath sounds: Normal breath sounds and air entry.   Abdominal:       General: Abdomen is flat. Bowel sounds are normal. There is no distension.      Palpations: Abdomen is soft. There is no mass.      Tenderness: There is no abdominal tenderness. There is no guarding.   Musculoskeletal:      Cervical back: Neck supple.      Right lower leg: No edema.      Left lower leg: No edema.   Skin:     General: Skin is warm and moist.      Coloration: Skin is not cyanotic, jaundiced or pale.      Findings: No rash.      Nails: There is no clubbing.   Neurological:      Mental Status: He is alert and oriented to person, place, and time.   Psychiatric:         Attention and Perception: Attention normal.         Mood and Affect: Mood and affect normal.         Speech: Speech normal.       Labs:  Results from last 7 days   Lab Units 09/19/23  1014 09/18/23  2107   WBC 10*3/mm3 7.33 6.19   HEMOGLOBIN g/dL 8.9* 5.7*   HEMATOCRIT % 28.7* 17.4*   PLATELETS 10*3/mm3 92* 105*      Results from last 7 days   Lab Units 09/19/23  1014 09/18/23 2107   SODIUM mmol/L 138 140   POTASSIUM mmol/L 4.3 4.8   CHLORIDE mmol/L 108* 109*   CO2 mmol/L 14.9* 17.7*   BUN mg/dL 45* 52*   CREATININE mg/dL 2.14* 2.41*   CALCIUM mg/dL 9.0 9.0   BILIRUBIN mg/dL  --  0.9   ALK PHOS U/L  --  88   ALT (SGPT) U/L  --  13   AST (SGOT) U/L  --  14   GLUCOSE mg/dL 189* 234*      Results from last 7 days   Lab Units 09/18/23  2107   INR  1.36*        Assessment & Plan:  Principal Problem:    Symptomatic anemia  Active Problems:    Type 2 diabetes mellitus    CKD (chronic kidney disease) stage 1, GFR 90 ml/min or greater    Essential hypertension    CAD (coronary artery disease)    History of dementia     Plan do upper endoscopy in the morning to identify any source of bleeding from the upper GI tract.  Risk and benefits discussed  Patient was given instructions and counseling regarding his condition or for health maintenance advice. Please see specific information pulled into the AVS if appropriate.        Signed:  Stacy  MD Ran  09/19/23  16:51 EDT

## 2023-09-19 NOTE — ED NOTES
LUCIA Cleveland agreed to blood transfusion and also patient signing his consent.  Notified of transfusing 2 units and will call with update after blood done.

## 2023-09-19 NOTE — ED PROVIDER NOTES
Time: 8:47 PM EDT  Date of encounter:  9/18/2023  Independent Historian/Clinical History and Information was obtained by:   Family    History is limited by: N/A    Chief Complaint   Patient presents with    Abnormal Lab         History of Present Illness:  Patient is a 87 y.o. year old male who presents to the emergency department for evaluation of [facility].  Patient admits to nausea, weakness, dizziness.  He denies rectal bleeding.    Family is at bedside and states he does have a history of dementia hypertension diabetes and hyperlipidemia.  No history of anemia requiring blood transfusion.  He is not on chemotherapy.  He is currently on Plavix.    HPI    Patient Care Team  Primary Care Provider: Kayla Renee APRN    Past Medical History:     No Known Allergies  Past Medical History:   Diagnosis Date    Dementia     Diabetes mellitus     Hyperlipidemia     Hypertension     Renal disorder      Past Surgical History:   Procedure Laterality Date    CATARACT EXTRACTION      COLONOSCOPY      CORONARY ARTERY BYPASS GRAFT      CYSTOSCOPY      PROSTATE SURGERY       Family History   Family history unknown: Yes       Home Medications:  Prior to Admission medications    Medication Sig Start Date End Date Taking? Authorizing Provider   acetaminophen-codeine (TYLENOL with CODEINE #3) 300-30 MG per tablet Take 1 tablet by mouth Every 8 (Eight) Hours As Needed.    Yahir Freeman MD   Aspirin 81 MG capsule Take 81 mg by mouth Daily.    Yahir Freeman MD   atorvastatin (LIPITOR) 40 MG tablet Take 40 mg by mouth Daily.    Yahir Freeman MD   clopidogrel (PLAVIX) 75 MG tablet Take 75 mg by mouth Daily.    Yahir Freeman MD   fexofenadine (ALLEGRA) 180 MG tablet Take 180 mg by mouth Daily. 5/3/22   Yahir Freeman MD   fluticasone (FLONASE) 50 MCG/ACT nasal spray 2 sprays by Each Nare route Daily As Needed.    Yahir Freeman MD   gabapentin (NEURONTIN) 300 MG capsule Take  300 mg by mouth 2 (Two) Times a Day. 5/3/22   Yahir Freeman MD   glipizide (GLUCOTROL XL) 2.5 MG 24 hr tablet Take 2.5 mg by mouth Daily.    Yahir Freeman MD   metoprolol succinate XL (TOPROL-XL) 50 MG 24 hr tablet Take 25 mg by mouth Daily. 5/3/22   Yahir Freeman MD   nitrofurantoin, macrocrystal-monohydrate, (Macrobid) 100 MG capsule Take 1 capsule by mouth 2 (Two) Times a Day. 6/27/22   Anette Wu MD   nitroglycerin (NITROSTAT) 0.4 MG SL tablet Take 0.4 mg by mouth Every 5 (Five) Minutes As Needed.    Yahir Freeman MD   pantoprazole (PROTONIX) 40 MG EC tablet Take 40 mg by mouth Daily.    Yahir Freeman MD   potassium chloride (K-DUR,KLOR-CON) 20 MEQ CR tablet Take 20 mEq by mouth Daily. 5/3/22   Yahir Freeman MD   sulfamethoxazole-trimethoprim (Bactrim DS) 800-160 MG per tablet Take 1 tablet by mouth 2 (Two) Times a Day. Patient needs to take half tablet twice daily for 10 days 5/27/22   Anette Wu MD        Social History:   Social History     Tobacco Use    Smoking status: Never    Smokeless tobacco: Never   Vaping Use    Vaping Use: Never used   Substance Use Topics    Alcohol use: Not Currently    Drug use: Never         Review of Systems:  Review of Systems   Constitutional:  Negative for chills and fever.   HENT:  Negative for congestion, ear pain and sore throat.    Eyes:  Negative for pain.   Respiratory:  Negative for cough, chest tightness and shortness of breath.    Cardiovascular:  Negative for chest pain.   Gastrointestinal:  Positive for abdominal pain and nausea. Negative for diarrhea and vomiting.   Genitourinary:  Negative for flank pain and hematuria.   Musculoskeletal:  Negative for joint swelling.   Skin:  Negative for pallor.   Neurological:  Positive for dizziness and light-headedness. Negative for seizures, syncope and headaches.   All other systems reviewed and are negative.     Physical Exam:  /53   Pulse 67    Temp 98 °F (36.7 °C)   Resp 21   SpO2 98%         Physical Exam  Constitutional:       Appearance: He is ill-appearing.   HENT:      Head: Normocephalic and atraumatic.      Nose: Nose normal.      Mouth/Throat:      Mouth: Mucous membranes are moist.   Eyes:      Extraocular Movements: Extraocular movements intact.      Conjunctiva/sclera: Conjunctivae normal.      Pupils: Pupils are equal, round, and reactive to light.   Cardiovascular:      Rate and Rhythm: Normal rate and regular rhythm.      Pulses: Normal pulses.      Heart sounds: Normal heart sounds.   Pulmonary:      Effort: Pulmonary effort is normal.      Breath sounds: Normal breath sounds.   Abdominal:      General: There is no distension.      Palpations: Abdomen is soft.      Tenderness: There is no abdominal tenderness.   Musculoskeletal:         General: Normal range of motion.      Cervical back: Normal range of motion.   Skin:     General: Skin is warm and dry.      Capillary Refill: Capillary refill takes less than 2 seconds.      Coloration: Skin is pale. Skin is not cyanotic.   Neurological:      General: No focal deficit present.      Mental Status: He is alert. Mental status is at baseline.   Psychiatric:         Attention and Perception: Attention and perception normal.         Mood and Affect: Mood normal.         Behavior: Behavior normal.                    Procedures:  Procedures      Medical Decision Making:      Comorbidities that affect care:    Coronary Artery Disease, Diabetes, Hypertension    External Notes reviewed:    Previous Admission Note: Patient was admitted on 5/13/2022 for uremia, elevated troponin, acute kidney injury, acute urinary retention and constipation.      The following orders were placed and all results were independently analyzed by me:  Orders Placed This Encounter   Procedures    XR Chest 1 View    [place] Draw    Comprehensive Metabolic Panel    Lactic Acid, Plasma    Occult Blood, Fecal By Immunoassay -  Stool, Per Rectum    CBC Auto Differential    High Sensitivity Troponin T    BNP    Protime-INR    aPTT    TSH Rfx On Abnormal To Free T4    Scan Slide    High Sensitivity Troponin T 2Hr    Urinalysis With Culture If Indicated - Urine, Clean Catch    NPO Diet NPO Type: Strict NPO    Undress & Gown    Measure Blood Pressure    Vital Signs    Orthostatic Blood Pressure    Verify Informed Consent    Bladder scan    Inpatient Hospitalist Consult    Pulse Oximetry    Oxygen Therapy- Nasal Cannula; Titrate 1-6 LPM Per SpO2; 90 - 95%    ECG 12 Lead Other; low hgb    Type & Screen    Prepare RBC, 2 Units    ABO RH Specimen Verification    Insert Peripheral IV    Inpatient Admission    CBC & Differential    Green Top (Gel)    Lavender Top    Gold Top - SST    Light Blue Top       Medications Given in the Emergency Department:  Medications   sodium chloride 0.9 % flush 10 mL (has no administration in time range)        ED Course:    The patient was initially evaluated in the triage area where orders were placed. The patient was later dispositioned by Cem Bryan MD.      The patient was advised to stay for completion of workup which includes but is not limited to communication of labs and radiological results, reassessment and plan. The patient was advised that leaving prior to disposition by a provider could result in critical findings that are not communicated to the patient.     ED Course as of 09/19/23 0039   Mon Sep 18, 2023   2048 --- PROVIDER IN TRIAGE NOTE ---    The patient was evaluated by Bakari carmona in triage. Orders were placed and the patient is currently awaiting disposition.    [AJ]      ED Course User Index  [AJ] Bakari Davis PA-C       Labs:    Lab Results (last 24 hours)       Procedure Component Value Units Date/Time    CBC & Differential [[id number]]  (Abnormal) Collected: 09/18/23 2107    Specimen: Blood Updated: 09/18/23 2155    Narrative:      The following orders were created  for panel order CBC & Differential.  Procedure                               Abnormality         Status                     ---------                               -----------         ------                     CBC Auto Differential[[id number]]        Abnormal            Final result               Scan Slide[[id number]]                                       Final result                 Please view results for these tests on the individual orders.    Comprehensive Metabolic Panel [[id number]]  (Abnormal) Collected: 09/18/23 2107    Specimen: Blood Updated: 09/18/23 2215     Glucose 234 mg/dL      BUN 52 mg/dL      Creatinine 2.41 mg/dL      Sodium 140 mmol/L      Potassium 4.8 mmol/L      Chloride 109 mmol/L      CO2 17.7 mmol/L      Calcium 9.0 mg/dL      Total Protein 7.2 g/dL      Albumin 4.4 g/dL      ALT (SGPT) 13 U/L      AST (SGOT) 14 U/L      Alkaline Phosphatase 88 U/L      Total Bilirubin 0.9 mg/dL      Globulin 2.8 gm/dL      A/G Ratio 1.6 g/dL      BUN/Creatinine Ratio 21.6     Anion Gap 13.3 mmol/L      eGFR 25.3 mL/min/1.73     Narrative:      GFR Normal >60  Chronic Kidney Disease <60  Kidney Failure <15    The GFR formula is only valid for adults with stable renal function between ages 18 and 70.    Lactic Acid, Plasma [[id number]]  (Normal) Collected: 09/18/23 2107    Specimen: Blood Updated: 09/18/23 2141     Lactate 2.0 mmol/L     CBC Auto Differential [[id number]]  (Abnormal) Collected: 09/18/23 2107    Specimen: Blood Updated: 09/18/23 2140     WBC 6.19 10*3/mm3      RBC 1.70 10*6/mm3      Hemoglobin 5.7 g/dL      Hematocrit 17.4 %      .4 fL      MCH 33.5 pg      MCHC 32.8 g/dL      RDW 24.2 %      RDW-SD 89.0 fl      MPV 11.5 fL      Platelets 105 10*3/mm3      Neutrophil % 67.5 %      Lymphocyte % 23.6 %      Monocyte % 5.7 %      Eosinophil % 1.1 %      Basophil % 1.3 %      Immature Grans % 0.8 %      Neutrophils, Absolute 4.18 10*3/mm3      Lymphocytes, Absolute 1.46 10*3/mm3       Monocytes, Absolute 0.35 10*3/mm3      Eosinophils, Absolute 0.07 10*3/mm3      Basophils, Absolute 0.08 10*3/mm3      Immature Grans, Absolute 0.05 10*3/mm3      nRBC 0.3 /100 WBC     BNP [[id number]]  (Abnormal) Collected: 09/18/23 2107    Specimen: Blood Updated: 09/18/23 2211     proBNP 8,695.0 pg/mL     Narrative:      Among patients with dyspnea, NT-proBNP is highly sensitive for the detection of acute congestive heart failure. In addition NT-proBNP of <300 pg/ml effectively rules out acute congestive heart failure with 99% negative predictive value.      Protime-INR [[id number]]  (Abnormal) Collected: 09/18/23 2107    Specimen: Blood Updated: 09/18/23 2148     Protime 16.8 Seconds      INR 1.36    Narrative:      Suggested Therapeutic Ranges For Oral Anticoagulant Therapy:  Level of Therapy                      INR Target Range  Standard Dose                            2.0-3.0  High Dose                                2.5-3.5  Patients not receiving anticoagulant  Therapy Normal Range                     0.86-1.15    aPTT [[id number]]  (Abnormal) Collected: 09/18/23 2107    Specimen: Blood Updated: 09/18/23 2148     PTT 37.2 seconds     TSH Rfx On Abnormal To Free T4 [[id number]]  (Normal) Collected: 09/18/23 2107    Specimen: Blood Updated: 09/18/23 2206     TSH 1.650 uIU/mL     Scan Slide [[id number]] Collected: 09/18/23 2107    Specimen: Blood Updated: 09/18/23 2155     Hypochromia Mod/2+     Microcytes Mod/2+     Macrocytes Mod/2+     Ovalocytes Slight/1+     Polychromasia Slight/1+     RBC Fragments Slight/1+     WBC Morphology Normal     Platelet Estimate Decreased     Large Platelets Slight/1+    High Sensitivity Troponin T [[id number]]  (Abnormal) Collected: 09/18/23 2204    Specimen: Blood Updated: 09/18/23 2246     HS Troponin T 29 ng/L     Narrative:      High Sensitive Troponin T Reference Range:  <10.0 ng/L- Negative Female for AMI  <15.0 ng/L- Negative Male for AMI  >=10 - Abnormal Female indicating  possible myocardial injury.  >=15 - Abnormal Male indicating possible myocardial injury.   Clinicians would have to utilize clinical acumen, EKG, Troponin, and serial changes to determine if it is an Acute Myocardial Infarction or myocardial injury due to an underlying chronic condition.         Occult Blood, Fecal By Immunoassay - Stool, Per Rectum [[id number]]  (Normal) Collected: 09/18/23 2207    Specimen: Stool from Per Rectum Updated: 09/18/23 2233     Occult Blood, Fecal by Immunoassay Negative    Urinalysis With Culture If Indicated - Urine, Clean Catch [[id number]]  (Abnormal) Collected: 09/18/23 2351    Specimen: Urine, Clean Catch Updated: 09/19/23 0018     Color, UA Yellow     Appearance, UA Clear     pH, UA <=5.0     Specific Gravity, UA 1.018     Glucose, UA Negative     Ketones, UA Negative     Bilirubin, UA Negative     Blood, UA Negative     Protein, UA Trace     Leuk Esterase, UA Negative     Nitrite, UA Negative     Urobilinogen, UA 1.0 E.U./dL    Narrative:      In absence of clinical symptoms, the presence of pyuria, bacteria, and/or nitrites on the urinalysis result does not correlate with infection.  Urine microscopic not indicated.    High Sensitivity Troponin T 2Hr [[id number]]  (Abnormal) Collected: 09/18/23 2354    Specimen: Blood Updated: 09/19/23 0026     HS Troponin T 28 ng/L      Troponin T Delta -1 ng/L     Narrative:      High Sensitive Troponin T Reference Range:  <10.0 ng/L- Negative Female for AMI  <15.0 ng/L- Negative Male for AMI  >=10 - Abnormal Female indicating possible myocardial injury.  >=15 - Abnormal Male indicating possible myocardial injury.   Clinicians would have to utilize clinical acumen, EKG, Troponin, and serial changes to determine if it is an Acute Myocardial Infarction or myocardial injury due to an underlying chronic condition.                  Imaging:    XR Chest 1 View    Result Date: 9/18/2023  PROCEDURE: XR CHEST 1 VW  COMPARISON: 5/13/2022.   INDICATIONS: SOA/SOB/shortness of air/shortness of breath; persistent cough.  FINDINGS: A single AP upright portable chest radiograph is provided for review.  There is possible new airspace opacification in the left lung base, which may represent pneumonia.  Aspiration pneumonia is possible.  Probably no acute infiltrate on the right.  Low lung volumes are present.  There may be borderline cardiac enlargement.  The patient has undergone median sternotomy and CABG surgery.  Fractured sternotomy wires are noted, as before.  Minimal, if any, pleural effusion is seen.  Thoracic aortic atherosclerotic change is seen.  No pneumothorax is identified.  There is generalized osteopenia.  There may be old healed right-sided rib fractures.       There is possible new left basilar pneumonia.  Consider close interval clinical and if necessary imaging follow-up to ensure a benign progression.      Please note that portions of this note were completed with a voice recognition program.  MARISSA WHITE JR, MD       Electronically Signed and Approved By: MARISSA WHITE JR, MD on 9/18/2023 at 22:44                 Differential Diagnosis and Discussion:      Dizziness: Based on the patient's history, signs, and symptoms, the diffential diagnosis includes but is not limited to meningitis, stroke, sepsis, subarachnoid hemorrhage, intracranial bleeding, encephalitis, vertigo, electrolyte imbalance, and metabolic disorders.  GI Bleeding: Differential diagnosis includes but is not limited to gastritis, gastric ulcer, stress ulcer, duodenitis, Laura-Crenshaw tears, esophageal varices, angiodysplasia, aortic enteric fistula, hematologic issues including thrombocytopenia, GI neoplasm, ulcerative colitis, Crohn's disease, diverticulosis, diverticulitis, hemorrhoids, aortic aneurysm, and polyps  Weakness: Based on the patient's history, signs, and symptoms, the diffential diagnosis includes but is not limited to meningitis, stroke, sepsis,  subarachnoid hemorrhage, intracranial bleeding, encephalitis, acute uti, dehydration, MS, myasthenia gravis, Guillan Violet, migraine variant, neuromuscular disorders vertigo, electrolyte imbalance, and metabolic disorders.    All labs were reviewed and interpreted by me.  All X-rays impressions were independently interpreted by me.  EKG was interpreted by me.    MDM  Number of Diagnoses or Management Options  Diagnosis management comments: Patient is afebrile nontoxic-appearing.  Vital signs stable.  Patient was sent for anemia.  Patient was found to have a hemoglobin of 5.7.  Patient was typed and crossed for 2 units.  Denies any blood in his stool Hemoccult here was negative.  No blood in his urine.  Other than dizziness and weakness patient denies any pain.  Chest x-ray did not show possible basilar pneumonia.  Patient was discussed with hospitalist and he will be admitted for further care.       Amount and/or Complexity of Data Reviewed  Clinical lab tests: reviewed  Tests in the radiology section of CPT®: reviewed  Review and summarize past medical records: yes  Independent visualization of images, tracings, or specimens: yes    Risk of Complications, Morbidity, and/or Mortality  Presenting problems: moderate  Management options: moderate         Critical Care Note: Total Critical Care time of 40 minutes. Total critical care time documented does not include time spent on separately billed procedures for services of nurses or physician assistants. I personally saw and examined the patient. I have reviewed all diagnostic interpretations and treatment plans as written. I was present for the key portions of any procedures performed and the inclusive time noted in any critical care statement. Critical care time includes patient management by me, time spent at the patients bedside,  time to review lab and imaging results, discussing patient care, documentation in the medical record, and time spent with family or  caregiver.    Patient Care Considerations:          Consultants/Shared Management Plan:    Hospitalist: I have discussed the case with Dr Zurita who agrees to accept the patient for admission.    Social Determinants of Health:    Patient has presented with family members who are responsible, reliable and will ensure follow up care.      Disposition and Care Coordination:    Admit:   Through independent evaluation of the patient's history, physical, and imperical data, the patient meets criteria for observation/admission to the hospital.        Final diagnoses:   Symptomatic anemia        ED Disposition       ED Disposition   Decision to Admit    Condition   --    Comment   Level of Care: Telemetry [5]   Diagnosis: Symptomatic anemia [[id number]]   Admitting Physician: SRUTHI ZURITA [[id number]]   Certification: I Certify That Inpatient Hospital Services Are Medically Necessary For Greater Than 2 Midnights                 This medical record created using voice recognition software.             Cem Bryan MD  09/19/23 0039

## 2023-09-19 NOTE — PLAN OF CARE
Goal Outcome Evaluation:                      VSS, Alert, oriented to self, tolerating diet and medication, plan is for EGD tomorrow.

## 2023-09-19 NOTE — H&P (VIEW-ONLY)
Chief Complaint  Abnormal Lab    History of Present Illness  Selvin Ferguson is a 87 y.o. male who has past medical history consistent with early dementia, diabetes, high cholesterol, hypertension, renal insufficiency, cataract, prostate surgery presents to [facility] FLOOR [facility] on referral from No ref. provider found for a gastroenterology evaluation of need severe anemia with hemoglobin about 5.  There is no history of overt active GI bleeding.  Patient denies any abdominal pain, nausea vomiting, heartburn, dysphagia, rectal bleeding, melena, hematemesis.        Labs Result Review Imaging    Past Medical History:   Diagnosis Date    Dementia     Diabetes mellitus     Hyperlipidemia     Hypertension     Renal disorder        Past Surgical History:   Procedure Laterality Date    CATARACT EXTRACTION      COLONOSCOPY      CORONARY ARTERY BYPASS GRAFT      CYSTOSCOPY      PROSTATE SURGERY           Current Facility-Administered Medications:     sennosides-docusate (PERICOLACE) 8.6-50 MG per tablet 2 tablet, 2 tablet, Oral, BID, 2 tablet at 09/19/23 0249 **AND** polyethylene glycol (MIRALAX) packet 17 g, 17 g, Oral, Daily PRN **AND** bisacodyl (DULCOLAX) EC tablet 5 mg, 5 mg, Oral, Daily PRN **AND** bisacodyl (DULCOLAX) suppository 10 mg, 10 mg, Rectal, Daily PRN, Gaudencio Boyce MD    dextrose (D50W) (25 g/50 mL) IV injection 25 g, 25 g, Intravenous, Q15 Min PRN, Gaudencio Boyce MD    dextrose (GLUTOSE) oral gel 15 g, 15 g, Oral, Q15 Min PRN, Gaudencio Boyce MD    famotidine (PEPCID) tablet 20 mg, 20 mg, Oral, BID AC, Olu Mak MD    glucagon (GLUCAGEN) injection 1 mg, 1 mg, Intramuscular, Q15 Min PRN, Gaudencio Boyce MD    hydrALAZINE (APRESOLINE) injection 10 mg, 10 mg, Intravenous, Q6H PRN, Gaudencio Boyce MD    Insulin Lispro (humaLOG) injection 2-7 Units, 2-7 Units, Subcutaneous, 4x Daily AC & at Bedtime, Gaudencio Boyce MD, 2 Units at 09/19/23 1142    metoprolol  succinate XL (TOPROL-XL) 24 hr tablet 25 mg, 25 mg, Oral, Daily, Olu Mak MD    ondansetron (ZOFRAN) injection 4 mg, 4 mg, Intravenous, Q6H PRN, Gaudencio Boyce MD    sodium bicarbonate tablet 1,300 mg, 1,300 mg, Oral, BID, Olu Mak MD    sodium chloride 0.9 % flush 10 mL, 10 mL, Intravenous, PRN, Cem Bryan MD    sodium chloride 0.9 % flush 10 mL, 10 mL, Intravenous, Q12H, Gaudencio Boyce MD, 10 mL at 09/19/23 0224    sodium chloride 0.9 % flush 10 mL, 10 mL, Intravenous, PRN, Gaudencio Boyce MD    sodium chloride 0.9 % infusion 40 mL, 40 mL, Intravenous, PRN, Gaudencio Boyce MD    tamsulosin (FLOMAX) 24 hr capsule 0.4 mg, 0.4 mg, Oral, Daily, Olu Mak MD, 0.4 mg at 09/19/23 1139     No Known Allergies    Family History   Family history unknown: Yes        Social History     Social History Narrative    Not on file   Social history negative for smoking, alcohol use, drugs    Immunization:    There is no immunization history on file for this patient.     Objective     Review of Systems 10 system review is negative except as mentioned HPI    Vital Signs:   /62   Pulse 75   Temp 98.5 °F (36.9 °C)   Resp 18   SpO2 100%       Physical Exam  Constitutional:       General: He is awake. He is not in acute distress.     Appearance: Normal appearance. He is well-developed and well-groomed.   HENT:      Head: Normocephalic and atraumatic.      Mouth/Throat:      Mouth: Mucous membranes are moist.      Comments: Ghislaine dental hygiene is good  Eyes:      General: Lids are normal.      Conjunctiva/sclera: Conjunctivae normal.      Pupils: Pupils are equal, round, and reactive to light.   Neck:      Thyroid: No thyroid mass.      Trachea: Trachea normal.   Cardiovascular:      Rate and Rhythm: Normal rate and regular rhythm.      Heart sounds: Normal heart sounds.   Pulmonary:      Effort: Pulmonary effort is normal.      Breath sounds: Normal breath sounds and air entry.   Abdominal:       General: Abdomen is flat. Bowel sounds are normal. There is no distension.      Palpations: Abdomen is soft. There is no mass.      Tenderness: There is no abdominal tenderness. There is no guarding.   Musculoskeletal:      Cervical back: Neck supple.      Right lower leg: No edema.      Left lower leg: No edema.   Skin:     General: Skin is warm and moist.      Coloration: Skin is not cyanotic, jaundiced or pale.      Findings: No rash.      Nails: There is no clubbing.   Neurological:      Mental Status: He is alert and oriented to person, place, and time.   Psychiatric:         Attention and Perception: Attention normal.         Mood and Affect: Mood and affect normal.         Speech: Speech normal.       Labs:  Results from last 7 days   Lab Units 09/19/23  1014 09/18/23  2107   WBC 10*3/mm3 7.33 6.19   HEMOGLOBIN g/dL 8.9* 5.7*   HEMATOCRIT % 28.7* 17.4*   PLATELETS 10*3/mm3 92* 105*      Results from last 7 days   Lab Units 09/19/23  1014 09/18/23 2107   SODIUM mmol/L 138 140   POTASSIUM mmol/L 4.3 4.8   CHLORIDE mmol/L 108* 109*   CO2 mmol/L 14.9* 17.7*   BUN mg/dL 45* 52*   CREATININE mg/dL 2.14* 2.41*   CALCIUM mg/dL 9.0 9.0   BILIRUBIN mg/dL  --  0.9   ALK PHOS U/L  --  88   ALT (SGPT) U/L  --  13   AST (SGOT) U/L  --  14   GLUCOSE mg/dL 189* 234*      Results from last 7 days   Lab Units 09/18/23  2107   INR  1.36*        Assessment & Plan:  Principal Problem:    Symptomatic anemia  Active Problems:    Type 2 diabetes mellitus    CKD (chronic kidney disease) stage 1, GFR 90 ml/min or greater    Essential hypertension    CAD (coronary artery disease)    History of dementia     Plan do upper endoscopy in the morning to identify any source of bleeding from the upper GI tract.  Risk and benefits discussed  Patient was given instructions and counseling regarding his condition or for health maintenance advice. Please see specific information pulled into the AVS if appropriate.        Signed:  Stacy  MD Ran  09/19/23  16:51 EDT

## 2023-09-19 NOTE — SIGNIFICANT NOTE
09/19/23 1049   Plan   Plan VINCENT, RN spoke with spouse/LUCIA Gardner to obtain information.  Patient lives with spouse that assist with ADL's.  Reports [facility] comes 3 days a week for 3 hours each time.  Reports no financial needs.  Facesheet verified.  Spouse plans for patient to return home and continue with [facility] at discharge.

## 2023-09-20 ENCOUNTER — APPOINTMENT (OUTPATIENT)
Dept: CARDIOLOGY | Facility: HOSPITAL | Age: 88
DRG: 811 | End: 2023-09-20
Payer: MEDICARE

## 2023-09-20 ENCOUNTER — ANESTHESIA EVENT (OUTPATIENT)
Dept: GASTROENTEROLOGY | Facility: HOSPITAL | Age: 88
DRG: 811 | End: 2023-09-20
Payer: MEDICARE

## 2023-09-20 ENCOUNTER — APPOINTMENT (OUTPATIENT)
Dept: CT IMAGING | Facility: HOSPITAL | Age: 88
DRG: 811 | End: 2023-09-20
Payer: MEDICARE

## 2023-09-20 ENCOUNTER — ANESTHESIA (OUTPATIENT)
Dept: GASTROENTEROLOGY | Facility: HOSPITAL | Age: 88
DRG: 811 | End: 2023-09-20
Payer: MEDICARE

## 2023-09-20 PROBLEM — R33.9 URINARY RETENTION: Status: ACTIVE | Noted: 2022-05-17

## 2023-09-20 PROBLEM — N18.9 ACUTE RENAL FAILURE SUPERIMPOSED ON CHRONIC KIDNEY DISEASE: Status: ACTIVE | Noted: 2022-05-14

## 2023-09-20 LAB
ALBUMIN SERPL ELPH-MCNC: 3.9 G/DL (ref 2.9–4.4)
ALBUMIN SERPL-MCNC: 4.1 G/DL (ref 3.5–5.2)
ALBUMIN/GLOB SERPL: 1.2 {RATIO} (ref 0.7–1.7)
ALPHA1 GLOB SERPL ELPH-MCNC: 0.3 G/DL (ref 0–0.4)
ALPHA2 GLOB SERPL ELPH-MCNC: 0.7 G/DL (ref 0.4–1)
ANION GAP SERPL CALCULATED.3IONS-SCNC: 13.9 MMOL/L (ref 5–15)
B-GLOBULIN SERPL ELPH-MCNC: 0.9 G/DL (ref 0.7–1.3)
BASOPHILS # BLD AUTO: 0.09 10*3/MM3 (ref 0–0.2)
BASOPHILS NFR BLD AUTO: 1.5 % (ref 0–1.5)
BH BB BLOOD EXPIRATION DATE: NORMAL
BH BB BLOOD EXPIRATION DATE: NORMAL
BH BB BLOOD TYPE BARCODE: 5100
BH BB BLOOD TYPE BARCODE: 5100
BH BB DISPENSE STATUS: NORMAL
BH BB DISPENSE STATUS: NORMAL
BH BB PRODUCT CODE: NORMAL
BH BB PRODUCT CODE: NORMAL
BH BB UNIT NUMBER: NORMAL
BH BB UNIT NUMBER: NORMAL
BUN SERPL-MCNC: 42 MG/DL (ref 8–23)
BUN/CREAT SERPL: 21.6 (ref 7–25)
CALCIUM SPEC-SCNC: 8.9 MG/DL (ref 8.6–10.5)
CHLORIDE SERPL-SCNC: 105 MMOL/L (ref 98–107)
CO2 SERPL-SCNC: 18.1 MMOL/L (ref 22–29)
CREAT SERPL-MCNC: 1.94 MG/DL (ref 0.76–1.27)
CROSSMATCH INTERPRETATION: NORMAL
CROSSMATCH INTERPRETATION: NORMAL
DEPRECATED RDW RBC AUTO: 77.3 FL (ref 37–54)
EGFRCR SERPLBLD CKD-EPI 2021: 32.9 ML/MIN/1.73
EOSINOPHIL # BLD AUTO: 0.15 10*3/MM3 (ref 0–0.4)
EOSINOPHIL NFR BLD AUTO: 2.4 % (ref 0.3–6.2)
ERYTHROCYTE [DISTWIDTH] IN BLOOD BY AUTOMATED COUNT: 21.4 % (ref 12.3–15.4)
GAMMA GLOB SERPL ELPH-MCNC: 1.5 G/DL (ref 0.4–1.8)
GLOBULIN SER-MCNC: 3.4 G/DL (ref 2.2–3.9)
GLUCOSE BLDC GLUCOMTR-MCNC: 114 MG/DL (ref 70–99)
GLUCOSE BLDC GLUCOMTR-MCNC: 136 MG/DL (ref 70–99)
GLUCOSE BLDC GLUCOMTR-MCNC: 144 MG/DL (ref 70–99)
GLUCOSE SERPL-MCNC: 149 MG/DL (ref 65–99)
HCT VFR BLD AUTO: 27 % (ref 37.5–51)
HCT VFR BLD AUTO: 27.3 % (ref 37.5–51)
HCT VFR BLD AUTO: 27.4 % (ref 37.5–51)
HCT VFR BLD AUTO: 30.4 % (ref 37.5–51)
HCYS SERPL-MCNC: 15.1 UMOL/L (ref 0–15)
HGB BLD-MCNC: 8.9 G/DL (ref 13–17.7)
HGB BLD-MCNC: 8.9 G/DL (ref 13–17.7)
HGB BLD-MCNC: 9 G/DL (ref 13–17.7)
HGB BLD-MCNC: 9.4 G/DL (ref 13–17.7)
IGA SERPL-MCNC: 363 MG/DL (ref 61–437)
IGG SERPL-MCNC: 1494 MG/DL (ref 603–1613)
IGM SERPL-MCNC: 26 MG/DL (ref 15–143)
IMM GRANULOCYTES # BLD AUTO: 0.04 10*3/MM3 (ref 0–0.05)
IMM GRANULOCYTES NFR BLD AUTO: 0.6 % (ref 0–0.5)
INTERPRETATION SERPL IEP-IMP: NORMAL
LABORATORY COMMENT REPORT: NORMAL
LYMPHOCYTES # BLD AUTO: 1.73 10*3/MM3 (ref 0.7–3.1)
LYMPHOCYTES NFR BLD AUTO: 27.9 % (ref 19.6–45.3)
M PROTEIN SERPL ELPH-MCNC: NORMAL G/DL
MCH RBC QN AUTO: 31.3 PG (ref 26.6–33)
MCHC RBC AUTO-ENTMCNC: 30.9 G/DL (ref 31.5–35.7)
MCV RBC AUTO: 101.3 FL (ref 79–97)
MONOCYTES # BLD AUTO: 0.27 10*3/MM3 (ref 0.1–0.9)
MONOCYTES NFR BLD AUTO: 4.4 % (ref 5–12)
NEUTROPHILS NFR BLD AUTO: 3.92 10*3/MM3 (ref 1.7–7)
NEUTROPHILS NFR BLD AUTO: 63.2 % (ref 42.7–76)
NRBC BLD AUTO-RTO: 0.5 /100 WBC (ref 0–0.2)
PHOSPHATE SERPL-MCNC: 3 MG/DL (ref 2.5–4.5)
PLATELET # BLD AUTO: 96 10*3/MM3 (ref 140–450)
PMV BLD AUTO: 11.5 FL (ref 6–12)
POTASSIUM SERPL-SCNC: 3.7 MMOL/L (ref 3.5–5.2)
PROT SERPL-MCNC: 7.3 G/DL (ref 6–8.5)
RBC # BLD AUTO: 3 10*6/MM3 (ref 4.14–5.8)
RETICS # AUTO: 0.04 10*6/MM3 (ref 0.02–0.13)
RETICS/RBC NFR AUTO: 1.45 % (ref 0.7–1.9)
SODIUM SERPL-SCNC: 137 MMOL/L (ref 136–145)
UNIT  ABO: NORMAL
UNIT  ABO: NORMAL
UNIT  RH: NORMAL
UNIT  RH: NORMAL
WBC NRBC COR # BLD: 6.2 10*3/MM3 (ref 3.4–10.8)

## 2023-09-20 PROCEDURE — 85045 AUTOMATED RETICULOCYTE COUNT: CPT | Performed by: INTERNAL MEDICINE

## 2023-09-20 PROCEDURE — 82784 ASSAY IGA/IGD/IGG/IGM EACH: CPT | Performed by: INTERNAL MEDICINE

## 2023-09-20 PROCEDURE — 83090 ASSAY OF HOMOCYSTEINE: CPT | Performed by: INTERNAL MEDICINE

## 2023-09-20 PROCEDURE — 82948 REAGENT STRIP/BLOOD GLUCOSE: CPT

## 2023-09-20 PROCEDURE — 97165 OT EVAL LOW COMPLEX 30 MIN: CPT

## 2023-09-20 PROCEDURE — 83921 ORGANIC ACID SINGLE QUANT: CPT | Performed by: INTERNAL MEDICINE

## 2023-09-20 PROCEDURE — 80069 RENAL FUNCTION PANEL: CPT | Performed by: INTERNAL MEDICINE

## 2023-09-20 PROCEDURE — 85014 HEMATOCRIT: CPT | Performed by: INTERNAL MEDICINE

## 2023-09-20 PROCEDURE — 74176 CT ABD & PELVIS W/O CONTRAST: CPT

## 2023-09-20 PROCEDURE — 0DB98ZX EXCISION OF DUODENUM, VIA NATURAL OR ARTIFICIAL OPENING ENDOSCOPIC, DIAGNOSTIC: ICD-10-PCS | Performed by: INTERNAL MEDICINE

## 2023-09-20 PROCEDURE — 99233 SBSQ HOSP IP/OBS HIGH 50: CPT | Performed by: FAMILY MEDICINE

## 2023-09-20 PROCEDURE — 85014 HEMATOCRIT: CPT | Performed by: FAMILY MEDICINE

## 2023-09-20 PROCEDURE — 93306 TTE W/DOPPLER COMPLETE: CPT

## 2023-09-20 PROCEDURE — 85018 HEMOGLOBIN: CPT | Performed by: INTERNAL MEDICINE

## 2023-09-20 PROCEDURE — 85025 COMPLETE CBC W/AUTO DIFF WBC: CPT | Performed by: FAMILY MEDICINE

## 2023-09-20 PROCEDURE — 85018 HEMOGLOBIN: CPT | Performed by: FAMILY MEDICINE

## 2023-09-20 PROCEDURE — 88305 TISSUE EXAM BY PATHOLOGIST: CPT | Performed by: INTERNAL MEDICINE

## 2023-09-20 PROCEDURE — 86334 IMMUNOFIX E-PHORESIS SERUM: CPT | Performed by: INTERNAL MEDICINE

## 2023-09-20 PROCEDURE — 25010000002 PROPOFOL 10 MG/ML EMULSION: Performed by: NURSE ANESTHETIST, CERTIFIED REGISTERED

## 2023-09-20 PROCEDURE — 43239 EGD BIOPSY SINGLE/MULTIPLE: CPT | Performed by: INTERNAL MEDICINE

## 2023-09-20 PROCEDURE — 71250 CT THORAX DX C-: CPT

## 2023-09-20 PROCEDURE — 97161 PT EVAL LOW COMPLEX 20 MIN: CPT

## 2023-09-20 RX ORDER — SODIUM CHLORIDE, SODIUM LACTATE, POTASSIUM CHLORIDE, CALCIUM CHLORIDE 600; 310; 30; 20 MG/100ML; MG/100ML; MG/100ML; MG/100ML
30 INJECTION, SOLUTION INTRAVENOUS CONTINUOUS
Status: DISCONTINUED | OUTPATIENT
Start: 2023-09-20 | End: 2023-09-23 | Stop reason: HOSPADM

## 2023-09-20 RX ORDER — LIDOCAINE HYDROCHLORIDE 20 MG/ML
INJECTION, SOLUTION EPIDURAL; INFILTRATION; INTRACAUDAL; PERINEURAL AS NEEDED
Status: DISCONTINUED | OUTPATIENT
Start: 2023-09-20 | End: 2023-09-20 | Stop reason: SURG

## 2023-09-20 RX ORDER — SODIUM CHLORIDE, SODIUM LACTATE, POTASSIUM CHLORIDE, CALCIUM CHLORIDE 600; 310; 30; 20 MG/100ML; MG/100ML; MG/100ML; MG/100ML
INJECTION, SOLUTION INTRAVENOUS CONTINUOUS PRN
Status: DISCONTINUED | OUTPATIENT
Start: 2023-09-20 | End: 2023-09-20 | Stop reason: SURG

## 2023-09-20 RX ORDER — FAMOTIDINE 20 MG/1
20 TABLET, FILM COATED ORAL
Status: DISCONTINUED | OUTPATIENT
Start: 2023-09-21 | End: 2023-09-23 | Stop reason: HOSPADM

## 2023-09-20 RX ADMIN — SODIUM BICARBONATE 650 MG TABLET 1300 MG: at 20:37

## 2023-09-20 RX ADMIN — Medication 10 ML: at 20:33

## 2023-09-20 RX ADMIN — PROPOFOL 200 MCG/KG/MIN: 10 INJECTION, EMULSION INTRAVENOUS at 15:30

## 2023-09-20 RX ADMIN — SODIUM CHLORIDE, POTASSIUM CHLORIDE, SODIUM LACTATE AND CALCIUM CHLORIDE 30 ML/HR: 600; 310; 30; 20 INJECTION, SOLUTION INTRAVENOUS at 15:11

## 2023-09-20 RX ADMIN — LIDOCAINE HYDROCHLORIDE 80 MG: 20 INJECTION, SOLUTION EPIDURAL; INFILTRATION; INTRACAUDAL; PERINEURAL at 15:30

## 2023-09-20 RX ADMIN — SODIUM CHLORIDE, POTASSIUM CHLORIDE, SODIUM LACTATE AND CALCIUM CHLORIDE: 600; 310; 30; 20 INJECTION, SOLUTION INTRAVENOUS at 15:30

## 2023-09-20 NOTE — PROGRESS NOTES
[facility]   Hospitalist Progress Note  Date: 2023  Patient Name: Selvin Ferguson  : 1935  MRN: [id number]  Date of admission: 2023      Subjective   Subjective       Chief complaint: Anemia    Summary:  87-year-old male hospitalized on 2023 with hemoglobin of 5.7 with significant anemia and elevated BNP and elevation of his INR, he had pulmonary edema and congestion seen on imaging, he was placed on diuretics in between of receiving blood products, he required 2 units to stabilize hemoglobins, hematology, nephrology and GI consulted.  Concern for myelodysplastic syndrome versus anemia of chronic kidney disease    Interval follow-up: Seen and examined this morning, no acute distress, no acute major night events, appearing better, no chest pain or palpitations, feels like his breathing is better, urine output 2 L over the past 24 hours, net -800 mL, creatinine at 1.94, bicarb 18, BUN 42, hemoglobin up to 9.0.  Creatinine down to 1.94.  Plans for endoscopy evaluation.    Review of systems:  Cannot obtain accurate review of systems due to dementia      Objective   Objective     Vitals:   Temp:  [98.2 °F (36.8 °C)-98.5 °F (36.9 °C)] 98.3 °F (36.8 °C)  Heart Rate:  [67-78] 78  Resp:  [16-18] 18  BP: (119-161)/(48-99) 139/99  Physical Exam               Constitutional: Awake, alert              Eyes: PERRLA, sclerae anicteric, no conjunctival injection, pallor              HENT: NCAT, mucous membranes moist              Neck: Supple, no thyromegaly, no lymphadenopathy, trachea midline              Respiratory: Clear to auscultation bilaterally, nonlabored respirations               Cardiovascular: RRR, no murmurs, rubs, or gallops, palpable pedal pulses bilaterally              Gastrointestinal: Positive bowel sounds, soft, nontender, nondistended              Musculoskeletal: No bilateral ankle edema, no clubbing or cyanosis to extremities              Psychiatric: Appropriate affect,  cooperative              Neurologic: Oriented to self, strength symmetric in all extremities, Cranial Nerves grossly intact to confrontation, speech clear              Skin: No rashes        Result Review    Result Review:  I have personally reviewed the pertinent results from the past 24 hours to 9/20/2023 13:23 EDT and agree with these findings:  [x]  Laboratory   CBC          9/18/2023    21:07 9/19/2023    10:14 9/20/2023    06:16 9/20/2023    11:43   CBC   WBC 6.19  7.33  6.20     RBC 1.70  2.90  3.00     Hemoglobin 5.7  8.9  9.4  9.0    Hematocrit 17.4  28.7  30.4  27.4    .4  99.0  101.3     MCH 33.5  30.7  31.3     MCHC 32.8  31.0  30.9     RDW 24.2  21.2  21.4     Platelets 105  92  96       BMP          9/18/2023    21:07 9/19/2023    10:14 9/20/2023    06:16   BMP   BUN 52  45  42    Creatinine 2.41  2.14  1.94    Sodium 140  138  137    Potassium 4.8  4.3  3.7    Chloride 109  108  105    CO2 17.7  14.9  18.1    Calcium 9.0  9.0  8.9      LIVER FUNCTION TESTS:      Lab 09/20/23  0616 09/18/23  2107   TOTAL PROTEIN  --  7.2   ALBUMIN 4.1 4.4   GLOBULIN  --  2.8   ALT (SGPT)  --  13   AST (SGOT)  --  14   BILIRUBIN  --  0.9   ALK PHOS  --  88       [x]  Microbiology No results found for: ACANTHNAEG, AFBCX, BPERTUSSISCX, BLOODCX  No results found for: BCIDPCR, CXREFLEX, CSFCX, CULTURETIS  No results found for: CULTURES, HSVCX, URCX  No results found for: EYECULTURE, GCCX, HSVCULTURE, LABHSV  No results found for: LEGIONELLA, MRSACX, MUMPSCX, MYCOPLASCX  No results found for: NOCARDIACX, STOOLCX  No results found for: THROATCX, UNSTIMCULT, URINECX, CULTURE, VZVCULTUR  No results found for: VIRALCULTU, WOUNDCX    [x]  Radiology XR Chest 1 View    Result Date: 9/18/2023  PROCEDURE: XR CHEST 1 VW  COMPARISON: 5/13/2022.  INDICATIONS: SOA/SOB/shortness of air/shortness of breath; persistent cough.  FINDINGS: A single AP upright portable chest radiograph is provided for review.  There is possible new  airspace opacification in the left lung base, which may represent pneumonia.  Aspiration pneumonia is possible.  Probably no acute infiltrate on the right.  Low lung volumes are present.  There may be borderline cardiac enlargement.  The patient has undergone median sternotomy and CABG surgery.  Fractured sternotomy wires are noted, as before.  Minimal, if any, pleural effusion is seen.  Thoracic aortic atherosclerotic change is seen.  No pneumothorax is identified.  There is generalized osteopenia.  There may be old healed right-sided rib fractures.       There is possible new left basilar pneumonia.  Consider close interval clinical and if necessary imaging follow-up to ensure a benign progression.      Please note that portions of this note were completed with a voice recognition program.  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 for EGD procedure  Continue every 6 hour hemoglobin monitoring  Continue sodium bicarb tabs  Nephrology consulted discussed with Dr. Kent, recommendations appreciated  Gastroenterology consulted discussed Dr. Tong, recommendations appreciated  Hematology consulted discussed with , recommendations  appreciated  Strict I's and O's  Daily weights  If hemoglobin drops less than 8, transfuse 1 unit PRBC  Follow-up neuroimaging ordered by hematology  A.m. labs  Full code  DVT prophylaxis with SCDs  Clinical course to dictate further management  Discussed with nurse at the bedside    DVT prophylaxis:  Mechanical DVT prophylaxis orders are present.    CODE STATUS:   Code Status (Patient has no pulse and is not breathing): CPR (Attempt to Resuscitate)  Medical Interventions (Patient has pulse or is breathing): Full Support        Electronically signed by Zoila Jung MD, 09/20/23, 1:23 PM EDT.    Portions of this documentation were transcribed electronically from a voice recognition software.  I confirm all data accurately represents the service(s) I performed at today's visit.

## 2023-09-20 NOTE — THERAPY EVALUATION
Acute Care - Physical Therapy Initial Evaluation   [facility]     Patient Name: Selvin Ferguson  : 1935  MRN: [id number]  Today's Date: 2023   Admit date: 2023     Referring Physician: Zoila Jung MD     Surgery Date:2023   Procedure(s) (LRB):  ESOPHAGOGASTRODUODENOSCOPY (N/A)        Visit Dx:     ICD-10-CM ICD-9-CM   1. Symptomatic anemia  D64.9 285.9   2. Difficulty in walking  R26.2 719.7     Patient Active Problem List   Diagnosis    Acute renal failure superimposed on chronic kidney disease    Uremia    Urinary retention    Bilateral hydronephrosis    Type 2 diabetes mellitus    Symptomatic anemia    Essential hypertension    CAD (coronary artery disease)    History of dementia    Chronic kidney disease, stage IV (severe)     Past Medical History:   Diagnosis Date    Dementia     Diabetes mellitus     Hyperlipidemia     Hypertension     Renal disorder      Past Surgical History:   Procedure Laterality Date    CATARACT EXTRACTION      COLONOSCOPY      CORONARY ARTERY BYPASS GRAFT      CYSTOSCOPY      PROSTATE SURGERY       PT Assessment (last 12 hours)       PT Evaluation and Treatment       Row Name 23 105          Physical Therapy Time and Intention    Subjective Information complains of;weakness (P)   -RH     Document Type evaluation (P)   -RH     Mode of Treatment individual therapy;physical therapy (P)   -RH     Patient Effort good (P)   -RH     Symptoms Noted During/After Treatment none (P)   -RH       Row Name 23 1056          General Information    Patient Profile Reviewed yes (P)   -RH     Patient Observations alert;cooperative;agree to therapy (P)   -RH     Prior Level of Function independent:;all household mobility;min assist:;community mobility;ADL's (P)   Per EMR, spouse assists with all ADLs.  -     Equipment Currently Used at Home cane, straight;walker, rolling (P)   Patient reports he has a cane and walker but never uses them.  -     Existing  Precautions/Restrictions fall (P)   -       Row Name 09/20/23 1058          Living Environment    Current Living Arrangements home (P)   -RH     Home Accessibility other (see comments) (P)   Patient is a poor historian due to cognitive deficits. Unable to gather subjective information related to steps inside/outside the home.  -     People in Home spouse (P)   -     Primary Care Provided by spouse/significant other (P)   -       Row Name 09/20/23 1058          Home Use of Assistive/Adaptive Equipment    Equipment Currently Used at Home walker, rolling;cane, straight (P)   -       Row Name 09/20/23 1058          Cognition    Affect/Mental Status (Cognition) confused (P)   -     Orientation Status (Cognition) oriented to;person;disoriented to;place;situation (P)   -     Follows Commands (Cognition) follows one-step commands;verbal cues/prompting required;repetition of directions required (P)   -       Row Name 09/20/23 1058          Range of Motion (ROM)    Range of Motion bilateral lower extremities;ROM is WFL (P)   -[facility] Name 09/20/23 1058          Strength (Manual Muscle Testing)    Strength (Manual Muscle Testing) bilateral lower extremities (P)   4+/5  -       Row Name 09/20/23 1058          Bed Mobility    Bed Mobility bed mobility (all) activities (P)   -     All Activities, [place] (Bed Mobility) standby assist (P)   -     Assistive Device (Bed Mobility) bed rails (P)   -       Row Name 09/20/23 1058          Transfers    Transfers sit-stand transfer;stand-sit transfer (P)   -[facility] Name 09/20/23 1058          Sit-Stand Transfer    Sit-Stand [place] (Transfers) contact guard (P)   -     Assistive Device (Sit-Stand Transfers) other (see comments) (P)   none  -       Row Name 09/20/23 1058          Stand-Sit Transfer    Stand-Sit [place] (Transfers) contact guard (P)   -     Assistive Device (Stand-Sit Transfers) other (see comments) (P)   none  -        Row Name 09/20/23 1058          Gait/Stairs (Locomotion)    Gait/Stairs Locomotion gait/ambulation independence (P)   -RH     [place] Level (Gait) minimum assist (75% patient effort) (P)   -RH     Assistive Device (Gait) other (see comments) (P)   Patient refused to use a rolling walker but was very unsteady and held onto things in the room to steady himself.  -RH     Patient was able to Ambulate yes (P)   -RH     Distance in Feet (Gait) 15 (P)   -RH       Row Name 09/20/23 1058          Safety Issues, Functional Mobility    Impairments Affecting Function (Mobility) balance;cognition;endurance/activity tolerance;strength (P)   -RH     Cognitive Impairments, Mobility Safety/Performance insight into deficits/self-awareness (P)   -       Row Name 09/20/23 1058          Balance    Balance Assessment standing dynamic balance (P)   -RH     Dynamic Standing Balance minimal assist (P)   -RH     Position/Device Used, Standing Balance supported (P)   Hands on assist  -       Row Name 09/20/23 1058          Plan of Care Review    Plan of Care Reviewed With patient (P)   -RH     Outcome Evaluation Patient presents with endurance and balance impairments that have resulted in a decline in functional mobility. He is very unsteady with ambulation but refuses to use an assistive device. He seems unaware of his deficits making him a fall risk and unsafe to return home at this time. Skilled physical therapy services are required to address his deficits. (P)   -RH       Row Name 09/20/23 1058          Therapy Assessment/Plan (PT)    Rehab Potential (PT) good, to achieve stated therapy goals (P)   -RH     Criteria for Skilled Interventions Met (PT) skilled treatment is necessary (P)   -RH     Therapy Frequency (PT) daily (P)   -RH     Predicted Duration of Therapy Intervention (PT) 10 days (P)   -RH     Problem List (PT) problems related to;balance;coordination;postural control;strength (P)   -RH     Activity Limitations  Related to Problem List (PT) unable to ambulate safely;unable to transfer safely;BADLs not performed adequately or safely;IADLs not performed adequately or safely;community activities not performed adequately or safely (P)   -       Row Name 09/20/23 1058          Therapy Plan Review/Discharge Plan (PT)    Therapy Plan Review (PT) evaluation/treatment results reviewed;care plan/treatment goals reviewed;participants included;patient (P)   -       Row Name 09/20/23 1058          Physical Therapy Goals    Transfer Goal Selection (PT) transfer, PT goal 1 (P)   -RH     Gait Training Goal Selection (PT) gait training, PT goal 1 (P)   -RH     Balance Goal Selection (PT) balance, PT goal 1 (P)   -       Row Name 09/20/23 1058          Transfer Goal 1 (PT)    Activity/Assistive Device (Transfer Goal 1, PT) transfers, all (P)   -RH     [place] Level/Cues Needed (Transfer Goal 1, PT) independent (P)   -RH     Time Frame (Transfer Goal 1, PT) long term goal (LTG);10 days (P)   -       Row Name 09/20/23 1058          Gait Training Goal 1 (PT)    Activity/Assistive Device (Gait Training Goal 1, PT) gait (walking locomotion) (P)   -RH     [place] Level (Gait Training Goal 1, PT) independent (P)   -RH     Distance (Gait Training Goal 1, PT) 85ft (P)   -RH     Time Frame (Gait Training Goal 1, PT) long term goal (LTG);10 days (P)   -       Row Name 09/20/23 1058          Balance Goal 1 (PT)    Activity/Assistive Device (Balance Goal 1, PT) standing, dynamic;assistive device use;walker, rolling (P)   -RH     [place] Level/Cues Needed (Balance Goal 1, PT) independent (P)   -RH     Time Frame (Balance Goal 1, PT) long term goal (LTG);10 days (P)   -RH               User Key  (r) = Recorded By, (t) = Taken By, (c) = Cosigned By      Initials Name Provider Type    RH Blake Escobar, PT Student PT Student                    Physical Therapy Education       Title: PT OT SLP Therapies (Done)       Topic:  Physical Therapy (Done)       Point: Mobility training (Done)       Learning Progress Summary             Patient Acceptance, E,TB, VU by  at 9/20/2023 1114                         Point: Body mechanics (Done)       Learning Progress Summary             Patient Acceptance, E,TB, VU by  at 9/20/2023 1114                         Point: Precautions (Done)       Learning Progress Summary             Patient Acceptance, E,TB, VU by  at 9/20/2023 1114                                         User Key       Initials Effective Dates Name Provider Type Discipline     08/16/23 -  Blake Escobar, PT Student PT Student PT                  PT Recommendation and Plan  Anticipated Discharge Disposition (PT): (P) sub acute care setting  Planned Therapy Interventions (PT): (P) balance training, bed mobility training, gait training, home exercise program, stair training, stretching, strengthening, transfer training, motor coordination training, neuromuscular re-education  Therapy Frequency (PT): (P) daily  Plan of Care Reviewed With: (P) patient  Outcome Evaluation: (P) Patient presents with endurance and balance impairments that have resulted in a decline in functional mobility. He is very unsteady with ambulation but refuses to use an assistive device. He seems unaware of his deficits making him a fall risk and unsafe to return home at this time. Skilled physical therapy services are required to address his deficits.   Outcome Measures       Row Name 09/20/23 1100             How much help from another person do you currently need...    Turning from your back to your side while in flat bed without using bedrails? 4 (P)   -RH      Moving from lying on back to sitting on the side of a flat bed without bedrails? 4 (P)   -RH      Moving to and from a bed to a chair (including a wheelchair)? 3 (P)   -RH      Standing up from a chair using your arms (e.g., wheelchair, bedside chair)? 3 (P)   -RH      Climbing 3-5 steps  with a railing? 2 (P)   -RH      To walk in hospital room? 3 (P)   -RH      AM-PAC 6 Clicks Score (PT) 19 (P)   -                User Key  (r) = Recorded By, (t) = Taken By, (c) = Cosigned By      Initials Name Provider Type     Blake Escobar, PT Student PT Student                     Time Calculation:    PT Charges       Row Name 09/20/23 1058             Time Calculation    PT Received On 09/20/23 (P)   -RH      PT Goal Re-Cert Due Date 09/29/23 (P)   -RH         Untimed Charges    PT Eval/Re-eval Minutes 25 (P)   -RH         Total Minutes    Untimed Charges Total Minutes 25 (P)   -RH       Total Minutes 25 (P)   -RH                User Key  (r) = Recorded By, (t) = Taken By, (c) = Cosigned By      Initials Name Provider Type     Blake Escobar, PT Student PT Student                  Therapy Charges for Today       Code Description Service Date Service Provider Modifiers Qty    [id number] HC PT EVAL LOW COMPLEXITY 2 9/20/2023 Blake Escobar, PT Student GP 1            PT G-Codes  AM-PAC 6 Clicks Score (PT): (P) 19    Blake Escobar PT Student  9/20/2023

## 2023-09-20 NOTE — THERAPY EVALUATION
Patient Name: Selvin Ferguson  : 1935    MRN: [id number]                              Today's Date: 2023       Admit Date: 2023    Visit Dx:     ICD-10-CM ICD-9-CM   1. Symptomatic anemia  D64.9 285.9   2. Difficulty in walking  R26.2 719.7   3. Decreased activities of daily living (ADL)  Z78.9 V49.89     Patient Active Problem List   Diagnosis    Acute renal failure superimposed on chronic kidney disease    Uremia    Urinary retention    Bilateral hydronephrosis    Type 2 diabetes mellitus    Symptomatic anemia    Essential hypertension    CAD (coronary artery disease)    History of dementia    Chronic kidney disease, stage IV (severe)     Past Medical History:   Diagnosis Date    Dementia     Diabetes mellitus     Hyperlipidemia     Hypertension     Renal disorder      Past Surgical History:   Procedure Laterality Date    CATARACT EXTRACTION      COLONOSCOPY      CORONARY ARTERY BYPASS GRAFT      CYSTOSCOPY      PROSTATE SURGERY        General Information       Row Name 23 1336          OT Time and Intention    Document Type evaluation  -LF     Mode of Treatment individual therapy;occupational therapy  -LF       Row Name 23 1336          General Information    Patient Profile Reviewed yes  -LF     Prior Level of Function --  (I) with ADLs, ambulated w/o a device but reports having canes and RW if needed, has a walk-in shower with shower chair/grab bars, elevated commode, stands to groom, and no home O2. Pt pleasantly confused with no family present to verify subjective info.  -LF     Existing Precautions/Restrictions fall  -LF     Barriers to Rehab none identified  -LF       Row Name 23 1336          Occupational Profile    Reason for Services/Referral (Occupational Profile) Patient is an 87 year old male admitted to [facility] for anemia on 2023. Occupational therapy consulted due to recent decline in ADLs/functional transfers. No previous  occupational therapy services for current condition.  -       Row Name 09/20/23 1336          Living Environment    People in Home spouse  [employer] services 3x/wk per EMR  -       Row Name 09/20/23 1336          Cognition    Orientation Status (Cognition) oriented to;person  Pleasantly confused and repetitive, questioning where we were repeatedly, and who staff was employed by.  -       Row Name 09/20/23 1336          Safety Issues, Functional Mobility    Safety Issues Affecting Function (Mobility) ability to follow commands;awareness of need for assistance;impulsivity;insight into deficits/self-awareness;judgment;problem-solving;safety precaution awareness;sequencing abilities  -     Impairments Affecting Function (Mobility) balance;cognition;endurance/activity tolerance  -               User Key  (r) = Recorded By, (t) = Taken By, (c) = Cosigned By      Initials Name Provider Type     Marla Fox OT Occupational Therapist                     Mobility/ADL's       Row Name 09/20/23 1344          Bed Mobility    Bed Mobility supine-sit  -     Supine-Sit [place] (Bed Mobility) standby assist  -     Bed Mobility, Safety Issues decreased use of arms for pushing/pulling;decreased use of legs for bridging/pushing  -       Row Name 09/20/23 1344          Transfers    Transfers sit-stand transfer;stand-sit transfer  -[facility] Name 09/20/23 1344          Sit-Stand Transfer    Sit-Stand [place] (Transfers) contact guard  -     Assistive Device (Sit-Stand Transfers) walker, front-wheeled  -[facility] Name 09/20/23 1344          Stand-Sit Transfer    Stand-Sit [place] (Transfers) contact guard  -     Assistive Device (Stand-Sit Transfers) walker, front-wheeled  -       Row Name 09/20/23 1344          Activities of Daily Living    BADL Assessment/Intervention bathing;upper body dressing;lower body dressing;grooming;feeding;toileting  -       Row Name 09/20/23 1344           Bathing Assessment/Intervention    [place] Level (Bathing) bathing skills;upper body;standby assist;lower body;minimum assist (75% patient effort)  -[facility] Name 09/20/23 1344          Upper Body Dressing Assessment/Training    [place] Level (Upper Body Dressing) upper body dressing skills;standby assist  -LF       Row Name 09/20/23 1344          Lower Body Dressing Assessment/Training    [place] Level (Lower Body Dressing) lower body dressing skills;minimum assist (75% patient effort)  -[facility] Name 09/20/23 1344          Grooming Assessment/Training    [place] Level (Grooming) grooming skills;standby assist  -LF       Row Name 09/20/23 1344          Self-Feeding Assessment/Training    Comment, (Feeding) Currently in NPO for procedure.  -[facility] Name 09/20/23 1344          Toileting Assessment/Training    [place] Level (Toileting) toileting skills;dependent (less than 25% patient effort)  -     Comment, (Toileting) Male purewick currently in place.  -               User Key  (r) = Recorded By, (t) = Taken By, (c) = Cosigned By      Initials Name Provider Type     Marla Fox, ROSIE Occupational Therapist                   Obj/Interventions       [facility] Name 09/20/23 1345          Sensory Assessment (Somatosensory)    Sensory Assessment (Somatosensory) UE sensation intact  -LF       Row Name 09/20/23 1345          Vision Assessment/Intervention    Visual Impairment/Limitations WFL  -LF       Row Name 09/20/23 1345          Range of Motion Comprehensive    General Range of Motion bilateral upper extremity ROM [facility]  -LF       Row Name 09/20/23 1345          Strength Comprehensive (MMT)    Comment, General Manual Muscle Testing (MMT) Assessment 4+/5 BUEs  -[facility] Name 09/20/23 1345          Motor Skills    Motor Skills coordination;functional endurance  -     Coordination WFL  -     Functional Endurance Fair  -[facility] Name 09/20/23 1345          Balance     Balance Assessment sitting dynamic balance;standing dynamic balance  -LF     Dynamic Sitting Balance supervision  -LF     Position, Sitting Balance unsupported;sitting edge of bed  -LF     Dynamic Standing Balance contact guard  -LF     Position/Device Used, Standing Balance supported;walker, front-wheeled  -LF               User Key  (r) = Recorded By, (t) = Taken By, (c) = Cosigned By      Initials Name Provider Type     Marla Fox, OT Occupational Therapist                   Goals/Plan       Row Name 09/20/23 1347          Bed Mobility Goal 1 (OT)    Activity/Assistive Device (Bed Mobility Goal 1, OT) bed mobility activities, all  -LF     [place] Level/Cues Needed (Bed Mobility Goal 1, OT) modified independence  -LF     Time Frame (Bed Mobility Goal 1, OT) long term goal (LTG);10 days  -LF       Row Name 09/20/23 1347          Transfer Goal 1 (OT)    Activity/Assistive Device (Transfer Goal 1, OT) transfers, all;walker, rolling  -LF     [place] Level/Cues Needed (Transfer Goal 1, OT) modified independence  -LF     Time Frame (Transfer Goal 1, OT) long term goal (LTG);10 days  -LF       Row Name 09/20/23 1347          Bathing Goal 1 (OT)    Activity/Device (Bathing Goal 1, OT) bathing skills, all  -LF     [place] Level/Cues Needed (Bathing Goal 1, OT) modified independence  -LF     Time Frame (Bathing Goal 1, OT) long term goal (LTG);10 days  -LF       Row Name 09/20/23 1347          Dressing Goal 1 (OT)    Activity/Device (Dressing Goal 1, OT) dressing skills, all  -LF     [place]/Cues Needed (Dressing Goal 1, OT) modified independence  -LF     Time Frame (Dressing Goal 1, OT) long term goal (LTG);10 days  -LF       Row Name 09/20/23 1347          Toileting Goal 1 (OT)    Activity/Device (Toileting Goal 1, OT) toileting skills, all  -LF     [place] Level/Cues Needed (Toileting Goal 1, OT) modified independence  -LF     Time Frame (Toileting Goal 1, OT) long term goal (LTG);10 days   -[facility] Name 09/20/23 1347          Grooming Goal 1 (OT)    Activity/Device (Grooming Goal 1, OT) grooming skills, all  -LF     [place] (Grooming Goal 1, OT) set-up required  -     Time Frame (Grooming Goal 1, OT) long term goal (LTG);10 days  -[facility] Name 09/20/23 1347          Problem Specific Goal 1 (OT)    Problem Specific Goal 1 (OT) Patient will demonstrate good endurance to support ADLs/functional transfers.  -     Time Frame (Problem Specific Goal 1, OT) long term goal (LTG);10 days  -[facility] Name 09/20/23 1347          Therapy Assessment/Plan (OT)    Planned Therapy Interventions (OT) activity tolerance training;patient/caregiver education/training;BADL retraining;functional balance retraining;occupation/activity based interventions;strengthening exercise;transfer/mobility retraining  -               User Key  (r) = Recorded By, (t) = Taken By, (c) = Cosigned By      Initials Name Provider Type     Marla Fox, OT Occupational Therapist                   Clinical Impression       Row Name 09/20/23 1342          Pain Assessment    Additional Documentation Pain Scale: FACES Pre/Post-Treatment (Group)  -LF       Row Name 09/20/23 1346          Pain Scale: FACES Pre/Post-Treatment    Pain: FACES Scale, Pretreatment 0-->no hurt  -     Posttreatment Pain Rating 0-->no hurt  -LF       Row Name 09/20/23 1342          Plan of Care Review    Plan of Care Reviewed With patient  -     Progress no change  -     Outcome Evaluation Patient presents with limitations in self-care, functional transfers, balance, and endurance. He would benefit from continued skilled occupational therapy services to maximize independence with ADLs/functional transfers.  -       Row Name 09/20/23 1340          Therapy Assessment/Plan (OT)    Patient/Family Therapy Goal Statement (OT) To maximize independence.  -     Rehab Potential (OT) good, to achieve stated therapy goals  -     Criteria for  Skilled Therapeutic Interventions Met (OT) yes;meets criteria;skilled treatment is necessary  -LF     Therapy Frequency (OT) 5 times/wk  -LF       Row Name 09/20/23 1346          Therapy Plan Review/Discharge Plan (OT)    Anticipated Discharge Disposition (OT) home with home health;home with assist  -       Row Name 09/20/23 1346          Vital Signs    O2 Delivery Pre Treatment room air  -LF     O2 Delivery Intra Treatment room air  -LF     O2 Delivery Post Treatment room air  -LF               User Key  (r) = Recorded By, (t) = Taken By, (c) = Cosigned By      Initials Name Provider Type    LF Marla Fox, ROSIE Occupational Therapist                   Outcome Measures       Row Name 09/20/23 1348          How much help from another is currently needed...    Putting on and taking off regular lower body clothing? 3  -LF     Bathing (including washing, rinsing, and drying) 3  -LF     Toileting (which includes using toilet bed pan or urinal) 1  -LF     Putting on and taking off regular upper body clothing 3  -LF     Taking care of personal grooming (such as brushing teeth) 3  -LF     Eating meals 4  -LF     AM-PAC 6 Clicks Score (OT) 17  -LF       Row Name 09/20/23 1100 09/20/23 0730       How much help from another person do you currently need...    Turning from your back to your side while in flat bed without using bedrails? 4 (P)   -RH 4  -TL    Moving from lying on back to sitting on the side of a flat bed without bedrails? 4 (P)   -RH 4  -TL    Moving to and from a bed to a chair (including a wheelchair)? 3 (P)   -RH 3  -TL    Standing up from a chair using your arms (e.g., wheelchair, bedside chair)? 3 (P)   -RH 3  -TL    Climbing 3-5 steps with a railing? 2 (P)   -RH 2  -TL    To walk in hospital room? 3 (P)   -RH 3  -TL    AM-PAC 6 Clicks Score (PT) 19 (P)   -RH 19  -TL    Highest level of mobility 6 --> Walked 10 steps or more (P)   -RH 6 --> Walked 10 steps or more  -TL      Row Name 09/20/23 1503           Functional Assessment    Outcome Measure Options AM-PAC 6 Clicks Daily Activity (OT);Optimal Instrument  -LF       Row Name 09/20/23 1348          Optimal Instrument    Optimal Instrument Optimal - 3  -LF     Bending/Stooping 2  -LF     Standing 2  -LF     Reaching 1  -LF     From the list, choose the 3 activities you would most like to be able to do without any difficulty Bending/stooping;Standing;Reaching  -LF     Total Score Optimal - 3 5  -LF               User Key  (r) = Recorded By, (t) = Taken By, (c) = Cosigned By      Initials Name Provider Type     Marla Fox OT Occupational Therapist    Sofia Sands, RN Registered Nurse    Blake Matute, PT Student PT Student                    Occupational Therapy Education       Title: PT OT SLP Therapies (In Progress)       Topic: Occupational Therapy (In Progress)       Point: ADL training (In Progress)       Description:   Instruct learner(s) on proper safety adaptation and remediation techniques during self care or transfers.   Instruct in proper use of assistive devices.                  Learning Progress Summary             Patient Acceptance, E,TB, NR by  at 9/20/2023 1348                         Point: Precautions (In Progress)       Description:   Instruct learner(s) on prescribed precautions during self-care and functional transfers.                  Learning Progress Summary             Patient Acceptance, E,TB, NR by  at 9/20/2023 1348                         Point: Body mechanics (In Progress)       Description:   Instruct learner(s) on proper positioning and spine alignment during self-care, functional mobility activities and/or exercises.                  Learning Progress Summary             Patient Acceptance, E,TB, NR by  at 9/20/2023 1348                                         User Key       Initials Effective Dates Name Provider Type [facility] 06/16/21 -  Marla Fox OT Occupational Therapist OT                   OT Recommendation and Plan  Planned Therapy Interventions (OT): activity tolerance training, patient/caregiver education/training, BADL retraining, functional balance retraining, occupation/activity based interventions, strengthening exercise, transfer/mobility retraining  Therapy Frequency (OT): 5 times/wk  Plan of Care Review  Plan of Care Reviewed With: patient  Progress: no change  Outcome Evaluation: Patient presents with limitations in self-care, functional transfers, balance, and endurance. He would benefit from continued skilled occupational therapy services to maximize independence with ADLs/functional transfers.     Time Calculation:   Evaluation Complexity (OT)  Review Occupational Profile/Medical/Therapy History Complexity: brief/low complexity  Assessment, Occupational Performance/Identification of Deficit Complexity: 3-5 performance deficits  Clinical Decision Making Complexity (OT): problem focused assessment/low complexity  Overall Complexity of Evaluation (OT): low complexity     Time Calculation- OT       Row Name 09/20/23 1348             Time Calculation- OT    OT Received On 09/20/23  -LF      OT Goal Re-Cert Due Date 09/29/23  -LF         Untimed Charges    OT Eval/Re-eval Minutes 36  -LF         Total Minutes    Untimed Charges Total Minutes 36  -LF       Total Minutes 36  -LF                User Key  (r) = Recorded By, (t) = Taken By, (c) = Cosigned By      Initials Name Provider Type    LF Marla Fox OT Occupational Therapist                  Therapy Charges for Today       Code Description Service Date Service Provider Modifiers Qty    [id number] HC OT EVAL LOW COMPLEXITY 3 9/20/2023 Marla Fox OT GO 1                 Marla Fox OT  9/20/2023

## 2023-09-20 NOTE — PLAN OF CARE
Goal Outcome Evaluation:      No acute events overnight, VSS, no complaints of pain. Plan for EGD today, NPO at midnight. Agatha Nguyễn RN

## 2023-09-20 NOTE — ANESTHESIA POSTPROCEDURE EVALUATION
Patient: Selvin Ferguson    Procedure Summary       Date: 09/20/23 Room / Location: [facility] ENDOSCOPY 3 / [facility] ENDOSCOPY    Anesthesia Start: 1530 Anesthesia Stop: 1538    Procedure: ESOPHAGOGASTRODUODENOSCOPY WITH COLD BIOPSIES Diagnosis:       Symptomatic anemia      (Symptomatic anemia [D64.9])    Surgeons: Renny Tong MD Provider: José Miguel Marquez CRNA    Anesthesia Type: Not recorded ASA Status: 3            Anesthesia Type: No value filed.    Vitals  Vitals Value Taken Time   /57 09/20/23 1557   Temp 36.4 °C (97.6 °F) 09/20/23 1556   Pulse 67 09/20/23 1601   Resp 18 09/20/23 1556   SpO2 95 % 09/20/23 1601   Vitals shown include unvalidated device data.        Post Anesthesia Care and Evaluation    Patient location during evaluation: bedside  Patient participation: complete - patient participated  Level of consciousness: awake  Pain score: 0  Pain management: adequate    Airway patency: patent  Anesthetic complications: No anesthetic complications  PONV Status: controlled  Cardiovascular status: acceptable and stable  Respiratory status: acceptable    Comments: Patient returning to inpatient room

## 2023-09-20 NOTE — PROGRESS NOTES
[facility]     Progress Note    Patient Name: Selvin Ferguson  : 1935  MRN: [id number]  Primary Care Physician:  Kayla Renee, CAYETANO  Date of admission: 2023    Subjective patient received blood transfusion hemoglobin 9.6  Patient did not express any concerns  Creatinine is at its baseline    Review of Systems  Constitutional:        Weakness tiredness fatigue  Eyes:                       No blurry vision, eye discharge, eye irritation, eye pain  HEENT:                   No acute hair loss, earache and discharge, nasal congestion or discharge, sore throat, postnasal drip  Respiratory:           No shortness of breath coughing sputum production wheezing hemoptysis pleuritic chest pain  Cardiovascular:     No chest pain, orthopnea, PND, dizziness, palpitation, lower extremity edema  Gastrointestinal:   No nausea vomiting diarrhea abdominal pain constipation  Genitourinary:       No urinary incontinence, hesitancy, frequency, urgency, dysuria  Hematologic:         No bruising, bleeding, pallor, lymphadenopathy  Endocrine:            No coldness, hot flashes, polyuria, abnormal hair growth  Musculoskeletal:    No body pains, aches, arthritic pains, muscle pain ,muscle wasting  Psychiatric:          No low or high mood, anxiety, hallucinations, delusions  Skin.                      No rash, ulcers, bruising, itching  Neurological:        No confusion, headache, focal weakness, numbness, dysphasia    Objective   Objective     Vitals:   Temp:  [98 °F (36.7 °C)-98.5 °F (36.9 °C)] 98.3 °F (36.8 °C)  Heart Rate:  [64-75] 70  Resp:  [16-18] 18  BP: (119-161)/(42-62) 135/58  Physical Exam    Constitutional: Awake, alert responsive, conversant, no obvious distress              Psychiatric:  Appropriate affect, cooperative   Neurologic:  Awake alert ,oriented x 3, strength symmetric in all extremities, Cranial Nerves grossly intact to confrontation, speech clear   Eyes:   PERRLA, sclerae anicteric, no  conjunctival injection   HEENT:  Moist mucous membranes, no nasal or eye discharge, no throat congestion   Neck:   Supple, no thyromegaly, no lymphadenopathy, trachea midline, no elevated JVD   Respiratory:  Clear to auscultation bilaterally, nonlabored respirations    Cardiovascular: RRR, no murmurs, rubs, or gallops, palpable pedal pulses bilaterally, No bilateral ankle edema   Gastrointestinal: Positive bowel sounds, soft, nontender, nondistended, no organomegaly   Musculoskeletal:  No clubbing or cyanosis to extremities,muscle wasting, joint swelling, muscle weakness             Skin:                      No rashes, bruising, skin ulcers, petechiae or ecchymosis    Result Review    Result Review:  I have personally reviewed the results from the time of this admission to 9/20/2023 08:28 EDT and agree with these findings:  []  Laboratory  []  Microbiology  []  Radiology  []  EKG/Telemetry   []  Cardiology/Vascular   []  Pathology  []  Old records  []  Other:    Assessment & Plan   Assessment / Plan       Active Hospital Problems:    Active Hospital Problems    Diagnosis  POA   • **Symptomatic anemia [D64.9]  Yes   • Essential hypertension [I10]  Unknown   • CAD (coronary artery disease) [I25.10]  Unknown   • History of dementia [Z86.59]  Not Applicable   • Chronic kidney disease, stage IV (severe) [N18.4]  Unknown      Baseline creatinine 2.0-2.4     • Type 2 diabetes mellitus [E11.9]  Yes   • Urinary retention [R33.9]  Yes   • Acute renal failure superimposed on chronic kidney disease [N17.9, N18.9]  Yes     Patient's baseline creatinine is around 2 and he follows with Dr. 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  Patient also has a metabolic acidosis secondary to chronic kidney disease stage IIIb/IV  Urine analysis unremarkable  Patient has a history of hydronephrosis         Plan:   Continue supportive care  Continue sodium bicarbonate as outpatient  No evidence of iron deficiency on  labs    Electronically signed by Annalee Kent MD, 09/20/23, 8:28 AM EDT.

## 2023-09-20 NOTE — PLAN OF CARE
Goal Outcome Evaluation:  Plan of Care Reviewed With: (P) patient           Outcome Evaluation: (P) Patient presents with endurance and balance impairments that have resulted in a decline in functional mobility. He is very unsteady with ambulation but refuses to use an assistive device. He seems unaware of his deficits making him a fall risk and unsafe to return home at this time. Skilled physical therapy services are required to address his deficits.      Anticipated Discharge Disposition (PT): (P) sub acute care setting

## 2023-09-20 NOTE — ANESTHESIA PREPROCEDURE EVALUATION
Anesthesia Evaluation     Patient summary reviewed and Nursing notes reviewed   NPO Solid Status: > 8 hours  NPO Liquid Status: > 2 hours           Airway   Mallampati: II  Dental    (+) poor dentition        Pulmonary     breath sounds clear to auscultation  Cardiovascular     PT is on anticoagulation therapy  Rhythm: regular  Rate: normal    (+) hypertension, CAD, CABG >6 Months, hyperlipidemia    ROS comment: Plavix last dose 9/18/23    Neuro/Psych  (+) dementia  GI/Hepatic/Renal/Endo    (+) renal disease, diabetes mellitus well controlled    Musculoskeletal     Abdominal    Substance History      OB/GYN          Other        ROS/Med Hx Other: H/H 9/27                  Anesthesia Plan    ASA 3           CODE STATUS:    Code Status (Patient has no pulse and is not breathing): CPR (Attempt to Resuscitate)  Medical Interventions (Patient has pulse or is breathing): Full Support

## 2023-09-20 NOTE — PLAN OF CARE
Goal Outcome Evaluation:  Plan of Care Reviewed With: patient        Progress: no change  Outcome Evaluation: Patient presents with limitations in self-care, functional transfers, balance, and endurance. He would benefit from continued skilled occupational therapy services to maximize independence with ADLs/functional transfers.      Anticipated Discharge Disposition (OT): home with home health, home with assist

## 2023-09-20 NOTE — PROGRESS NOTES
[facility]     Progress Note    Patient Name: Selvin Ferguson  : 1935  MRN: [id number]  Primary Care Physician:  Kayla Renee APRN  Date of admission: 2023    Subjective   Subjective     Chief Complaint: Does not appear to be in any acute distress however he is not able to give any information totally confused with dementia he apparently has been getting darbepoetin injection I have no idea where he is getting those we will try to reach his wife again to see if somebody has been taking care of him    HPI: Does appear to have myelodysplastic syndrome is quite possible that some of it may be because of anemia of chronic disease from renal failure    Review of Systems   All systems were reviewed and negative except for: Reviewed    Objective   Objective     Vitals:   Temp:  [98 °F (36.7 °C)-98.5 °F (36.9 °C)] 98.3 °F (36.8 °C)  Heart Rate:  [64-75] 70  Resp:  [16-18] 18  BP: (119-161)/(42-62) 135/58    Physical Exam    Constitutional: Awake, alert   Eyes: PERRLA, sclerae anicteric, no conjunctival injection   HENT: NCAT, mucous membranes moist   Neck: Supple, no thyromegaly, no lymphadenopathy, trachea midline   Respiratory: Clear to auscultation bilaterally, nonlabored respirations    Cardiovascular: RRR, no murmurs, rubs, or gallops, palpable pedal pulses bilaterally   Gastrointestinal: Positive bowel sounds, soft, nontender, nondistended   Musculoskeletal: No bilateral ankle edema, no clubbing or cyanosis to extremities   Psychiatric: Appropriate affect, cooperative   Neurologic: Oriented x 3, strength symmetric in all extremities, Cranial Nerves grossly intact to confrontation, speech clear   Skin: No rashes   Patient has dementia but alert and oriented  Result Review    Result Review:  I have personally reviewed the results from the time of this admission to 2023 08:23 EDT and agree with these findings:  [x]  Laboratory is likely multifactorial anemia which he anemia of chronic disease  and myelodysplastic syndrome  []  Microbiology  []  Radiology  []  EKG/Telemetry   []  Cardiology/Vascular   []  Pathology  []  Old records  []  Other:  Most notable findings include: Myelodysplastic syndrome with anemia of chronic disease    Assessment & Plan   Assessment / Plan     Brief Patient Summary:  Selvin Ferguson is a 87 y.o. male who will try to get in touch with his wife to get further information    Active Hospital Problems:  Active Hospital Problems    Diagnosis    • **Symptomatic anemia    • Essential hypertension    • CAD (coronary artery disease)    • History of dementia    • Chronic kidney disease, stage IV (severe)    • Type 2 diabetes mellitus        Plan:   Has been reviewed    DVT prophylaxis:  Mechanical DVT prophylaxis orders are present.    CODE STATUS:   Code Status (Patient has no pulse and is not breathing): CPR (Attempt to Resuscitate)  Medical Interventions (Patient has pulse or is breathing): Full Support    Disposition:  I expect patient to be discharged after the patient is stabilized.    Electronically signed by Tristin Reece MD, 09/20/23, 8:23 AM EDT.      Part of this note may be an electronic transcription/translation of spoken language to printed text using the Dragon Dictation System.

## 2023-09-21 LAB
ALBUMIN SERPL-MCNC: 4 G/DL (ref 3.5–5.2)
ALP SERPL-CCNC: 84 U/L (ref 39–117)
ALT SERPL W P-5'-P-CCNC: 10 U/L (ref 1–41)
ANION GAP SERPL CALCULATED.3IONS-SCNC: 11.3 MMOL/L (ref 5–15)
ASCENDING AORTA: 3.7 CM
AST SERPL-CCNC: 11 U/L (ref 1–40)
BASOPHILS # BLD AUTO: 0.1 10*3/MM3 (ref 0–0.2)
BASOPHILS NFR BLD AUTO: 2 % (ref 0–1.5)
BH CV ECHO MEAS - AI P1/2T: 677.1 MSEC
BH CV ECHO MEAS - AO MAX PG: 9 MMHG
BH CV ECHO MEAS - AO MEAN PG: 4 MMHG
BH CV ECHO MEAS - AO ROOT DIAM: 3 CM
BH CV ECHO MEAS - AO V2 MAX: 154 CM/SEC
BH CV ECHO MEAS - AO V2 VTI: 30 CM
BH CV ECHO MEAS - AVA(I,D): 2.6 CM2
BH CV ECHO MEAS - EDV(CUBED): 115.5 ML
BH CV ECHO MEAS - EDV(MOD-SP2): 87 ML
BH CV ECHO MEAS - EDV(MOD-SP4): 94.9 ML
BH CV ECHO MEAS - EF(MOD-BP): 55.5 %
BH CV ECHO MEAS - EF(MOD-SP2): 56.1 %
BH CV ECHO MEAS - EF(MOD-SP4): 55.1 %
BH CV ECHO MEAS - ESV(CUBED): 37.6 ML
BH CV ECHO MEAS - ESV(MOD-SP2): 38.2 ML
BH CV ECHO MEAS - ESV(MOD-SP4): 42.6 ML
BH CV ECHO MEAS - FS: 31.2 %
BH CV ECHO MEAS - IVS/LVPW: 0.8 CM
BH CV ECHO MEAS - IVSD: 1.29 CM
BH CV ECHO MEAS - LA DIMENSION: 4.6 CM
BH CV ECHO MEAS - LAT PEAK E' VEL: 11 CM/SEC
BH CV ECHO MEAS - LV MASS(C)D: 294.8 GRAMS
BH CV ECHO MEAS - LV MAX PG: 5.4 MMHG
BH CV ECHO MEAS - LV MEAN PG: 3 MMHG
BH CV ECHO MEAS - LV V1 MAX: 116 CM/SEC
BH CV ECHO MEAS - LV V1 VTI: 22.2 CM
BH CV ECHO MEAS - LVIDD: 4.9 CM
BH CV ECHO MEAS - LVIDS: 3.4 CM
BH CV ECHO MEAS - LVOT AREA: 3.5 CM2
BH CV ECHO MEAS - LVOT DIAM: 2.1 CM
BH CV ECHO MEAS - LVPWD: 1.61 CM
BH CV ECHO MEAS - MED PEAK E' VEL: 5.9 CM/SEC
BH CV ECHO MEAS - MV A MAX VEL: 90.4 CM/SEC
BH CV ECHO MEAS - MV DEC SLOPE: 639 CM/SEC2
BH CV ECHO MEAS - MV DEC TIME: 170 SEC
BH CV ECHO MEAS - MV E MAX VEL: 109 CM/SEC
BH CV ECHO MEAS - MV E/A: 1.21
BH CV ECHO MEAS - PA V2 MAX: 113 CM/SEC
BH CV ECHO MEAS - RAP SYSTOLE: 5 MMHG
BH CV ECHO MEAS - RVSP: 43.2 MMHG
BH CV ECHO MEAS - SV(LVOT): 76.9 ML
BH CV ECHO MEAS - SV(MOD-SP2): 48.8 ML
BH CV ECHO MEAS - SV(MOD-SP4): 52.3 ML
BH CV ECHO MEAS - TAPSE (>1.6): 1.89 CM
BH CV ECHO MEAS - TR MAX PG: 38.2 MMHG
BH CV ECHO MEAS - TR MAX VEL: 309 CM/SEC
BH CV ECHO MEASUREMENTS AVERAGE E/E' RATIO: 12.9
BH CV XLRA - TDI S': 12.3 CM/SEC
BILIRUB CONJ SERPL-MCNC: 0.4 MG/DL (ref 0–0.3)
BILIRUB INDIRECT SERPL-MCNC: 1.4 MG/DL
BILIRUB SERPL-MCNC: 1.8 MG/DL (ref 0–1.2)
BUN SERPL-MCNC: 33 MG/DL (ref 8–23)
BUN/CREAT SERPL: 19 (ref 7–25)
CALCIUM SPEC-SCNC: 8.8 MG/DL (ref 8.6–10.5)
CHLORIDE SERPL-SCNC: 107 MMOL/L (ref 98–107)
CO2 SERPL-SCNC: 19.7 MMOL/L (ref 22–29)
CREAT SERPL-MCNC: 1.74 MG/DL (ref 0.76–1.27)
DEPRECATED RDW RBC AUTO: 76.2 FL (ref 37–54)
EGFRCR SERPLBLD CKD-EPI 2021: 37.5 ML/MIN/1.73
EOSINOPHIL # BLD AUTO: 0.13 10*3/MM3 (ref 0–0.4)
EOSINOPHIL NFR BLD AUTO: 2.6 % (ref 0.3–6.2)
ERYTHROCYTE [DISTWIDTH] IN BLOOD BY AUTOMATED COUNT: 20.5 % (ref 12.3–15.4)
GLUCOSE BLDC GLUCOMTR-MCNC: 167 MG/DL (ref 70–99)
GLUCOSE BLDC GLUCOMTR-MCNC: 170 MG/DL (ref 70–99)
GLUCOSE BLDC GLUCOMTR-MCNC: 173 MG/DL (ref 70–99)
GLUCOSE BLDC GLUCOMTR-MCNC: 175 MG/DL (ref 70–99)
GLUCOSE SERPL-MCNC: 163 MG/DL (ref 65–99)
HCT VFR BLD AUTO: 28.8 % (ref 37.5–51)
HGB BLD-MCNC: 8.7 G/DL (ref 13–17.7)
IGA SERPL-MCNC: 400 MG/DL (ref 61–437)
IGG SERPL-MCNC: 1589 MG/DL (ref 603–1613)
IGM SERPL-MCNC: 29 MG/DL (ref 15–143)
IMM GRANULOCYTES # BLD AUTO: 0.03 10*3/MM3 (ref 0–0.05)
IMM GRANULOCYTES NFR BLD AUTO: 0.6 % (ref 0–0.5)
LEFT ATRIUM VOLUME INDEX: 21.3 ML/M2
LYMPHOCYTES # BLD AUTO: 1.31 10*3/MM3 (ref 0.7–3.1)
LYMPHOCYTES NFR BLD AUTO: 25.9 % (ref 19.6–45.3)
Lab: NORMAL
MAGNESIUM SERPL-MCNC: 2.1 MG/DL (ref 1.6–2.4)
MCH RBC QN AUTO: 31 PG (ref 26.6–33)
MCHC RBC AUTO-ENTMCNC: 30.2 G/DL (ref 31.5–35.7)
MCV RBC AUTO: 102.5 FL (ref 79–97)
MONOCYTES # BLD AUTO: 0.26 10*3/MM3 (ref 0.1–0.9)
MONOCYTES NFR BLD AUTO: 5.1 % (ref 5–12)
NEUTROPHILS NFR BLD AUTO: 3.22 10*3/MM3 (ref 1.7–7)
NEUTROPHILS NFR BLD AUTO: 63.8 % (ref 42.7–76)
NRBC BLD AUTO-RTO: 0 /100 WBC (ref 0–0.2)
PHOSPHATE SERPL-MCNC: 2.5 MG/DL (ref 2.5–4.5)
PLATELET # BLD AUTO: 63 10*3/MM3 (ref 140–450)
PMV BLD AUTO: 12.1 FL (ref 6–12)
POTASSIUM SERPL-SCNC: 3.8 MMOL/L (ref 3.5–5.2)
PROT PATTERN SERPL IFE-IMP: NORMAL
PROT SERPL-MCNC: 6.8 G/DL (ref 6–8.5)
QT INTERVAL: 416 MS
QTC INTERVAL: 457 MS
RBC # BLD AUTO: 2.81 10*6/MM3 (ref 4.14–5.8)
SODIUM SERPL-SCNC: 138 MMOL/L (ref 136–145)
WBC NRBC COR # BLD: 5.05 10*3/MM3 (ref 3.4–10.8)

## 2023-09-21 PROCEDURE — 83735 ASSAY OF MAGNESIUM: CPT | Performed by: INTERNAL MEDICINE

## 2023-09-21 PROCEDURE — 63710000001 INSULIN LISPRO (HUMAN) PER 5 UNITS: Performed by: INTERNAL MEDICINE

## 2023-09-21 PROCEDURE — 97535 SELF CARE MNGMENT TRAINING: CPT

## 2023-09-21 PROCEDURE — 80076 HEPATIC FUNCTION PANEL: CPT | Performed by: INTERNAL MEDICINE

## 2023-09-21 PROCEDURE — 99232 SBSQ HOSP IP/OBS MODERATE 35: CPT | Performed by: FAMILY MEDICINE

## 2023-09-21 PROCEDURE — 85025 COMPLETE CBC W/AUTO DIFF WBC: CPT | Performed by: INTERNAL MEDICINE

## 2023-09-21 PROCEDURE — 80048 BASIC METABOLIC PNL TOTAL CA: CPT | Performed by: INTERNAL MEDICINE

## 2023-09-21 PROCEDURE — 82948 REAGENT STRIP/BLOOD GLUCOSE: CPT

## 2023-09-21 PROCEDURE — 97110 THERAPEUTIC EXERCISES: CPT

## 2023-09-21 PROCEDURE — 84100 ASSAY OF PHOSPHORUS: CPT | Performed by: INTERNAL MEDICINE

## 2023-09-21 RX ADMIN — POLYETHYLENE GLYCOL 3350, SODIUM SULFATE ANHYDROUS, SODIUM BICARBONATE, SODIUM CHLORIDE, POTASSIUM CHLORIDE 2000 ML: 236; 22.74; 6.74; 5.86; 2.97 POWDER, FOR SOLUTION ORAL at 21:15

## 2023-09-21 RX ADMIN — SENNOSIDES AND DOCUSATE SODIUM 2 TABLET: 50; 8.6 TABLET ORAL at 21:14

## 2023-09-21 RX ADMIN — FAMOTIDINE 20 MG: 20 TABLET ORAL at 09:26

## 2023-09-21 RX ADMIN — INSULIN LISPRO 2 UNITS: 100 INJECTION, SOLUTION INTRAVENOUS; SUBCUTANEOUS at 17:15

## 2023-09-21 RX ADMIN — METOPROLOL SUCCINATE 25 MG: 25 TABLET, EXTENDED RELEASE ORAL at 09:26

## 2023-09-21 RX ADMIN — SENNOSIDES AND DOCUSATE SODIUM 2 TABLET: 50; 8.6 TABLET ORAL at 09:26

## 2023-09-21 RX ADMIN — INSULIN LISPRO 2 UNITS: 100 INJECTION, SOLUTION INTRAVENOUS; SUBCUTANEOUS at 21:14

## 2023-09-21 RX ADMIN — TAMSULOSIN HYDROCHLORIDE 0.4 MG: 0.4 CAPSULE ORAL at 09:26

## 2023-09-21 RX ADMIN — SODIUM BICARBONATE 650 MG TABLET 1300 MG: at 09:27

## 2023-09-21 RX ADMIN — Medication 10 ML: at 09:27

## 2023-09-21 RX ADMIN — INSULIN LISPRO 2 UNITS: 100 INJECTION, SOLUTION INTRAVENOUS; SUBCUTANEOUS at 13:02

## 2023-09-21 RX ADMIN — Medication 10 ML: at 21:15

## 2023-09-21 RX ADMIN — SODIUM BICARBONATE 650 MG TABLET 1300 MG: at 21:14

## 2023-09-21 NOTE — PROGRESS NOTES
[facility]     Progress Note    Patient Name: Selvin Ferguson  : 1935  MRN: [id number]  Primary Care Physician:  Kayla Renee APRN  Date of admission: 2023    Subjective patient is resting very comfortably and has no new issues.  Creatinine is down to 1.74.  Hemoglobin stable around 8.7    Review of Systems  Constitutional:        Weakness tiredness fatigue  Eyes:                       No blurry vision, eye discharge, eye irritation, eye pain  HEENT:                   No acute hair loss, earache and discharge, nasal congestion or discharge, sore throat, postnasal drip  Respiratory:           No shortness of breath coughing sputum production wheezing hemoptysis pleuritic chest pain  Cardiovascular:     No chest pain, orthopnea, PND, dizziness, palpitation, lower extremity edema  Gastrointestinal:   No nausea vomiting diarrhea abdominal pain constipation  Genitourinary:       No urinary incontinence, hesitancy, frequency, urgency, dysuria  Hematologic:         No bruising, bleeding, pallor, lymphadenopathy  Endocrine:            No coldness, hot flashes, polyuria, abnormal hair growth  Musculoskeletal:    No body pains, aches, arthritic pains, muscle pain ,muscle wasting  Psychiatric:          No low or high mood, anxiety, hallucinations, delusions  Skin.                      No rash, ulcers, bruising, itching  Neurological:        No confusion, headache, focal weakness, numbness, dysphasia    Objective   Objective     Vitals:   Temp:  [97 °F (36.1 °C)-98.5 °F (36.9 °C)] 98.3 °F (36.8 °C)  Heart Rate:  [68-88] 85  Resp:  [16-21] 18  BP: ()/(45-99) 112/48  Flow (L/min):  [2] 2  Physical Exam    Constitutional: Awake, alert responsive, conversant, no obvious distress              Psychiatric:  Appropriate affect, cooperative   Neurologic:  Awake alert ,oriented x 3, strength symmetric in all extremities, Cranial Nerves grossly intact to confrontation, speech clear   Eyes:   PERRLA, sclerae  anicteric, no conjunctival injection   HEENT:  Moist mucous membranes, no nasal or eye discharge, no throat congestion   Neck:   Supple, no thyromegaly, no lymphadenopathy, trachea midline, no elevated JVD   Respiratory:  Clear to auscultation bilaterally, nonlabored respirations    Cardiovascular: RRR, no murmurs, rubs, or gallops, palpable pedal pulses bilaterally, No bilateral ankle edema   Gastrointestinal: Positive bowel sounds, soft, nontender, nondistended, no organomegaly   Musculoskeletal:  No clubbing or cyanosis to extremities,muscle wasting, joint swelling, muscle weakness             Skin:                      No rashes, bruising, skin ulcers, petechiae or ecchymosis    Result Review    Result Review:  I have personally reviewed the results from the time of this admission to 9/21/2023 09:20 EDT and agree with these findings:  []  Laboratory  []  Microbiology  []  Radiology  []  EKG/Telemetry   []  Cardiology/Vascular   []  Pathology  []  Old records  []  Other:    Assessment & Plan   Assessment / Plan       Active Hospital Problems:    Active Hospital Problems    Diagnosis  POA   • **Symptomatic anemia [D64.9]  Yes   • Essential hypertension [I10]  Unknown   • CAD (coronary artery disease) [I25.10]  Unknown   • History of dementia [Z86.59]  Not Applicable   • Chronic kidney disease, stage IV (severe) [N18.4]  Unknown      Baseline creatinine 2.0-2.4     • Type 2 diabetes mellitus [E11.9]  Yes   • Urinary retention [R33.9]  Yes   • Acute renal failure superimposed on chronic kidney disease [N17.9, N18.9]  Yes     Patient's baseline creatinine is around 2 and he follows with Dr. 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  Patient also has a metabolic acidosis secondary to chronic kidney disease stage IIIb/IV  Urine analysis unremarkable  Patient has a history of hydronephrosis         Plan:   Continue present care patient's renal function is close to its baseline  Discharge planning per primary  team       Electronically signed by Annalee Kent MD, 09/21/23, 9:20 AM EDT.

## 2023-09-21 NOTE — PLAN OF CARE
Goal Outcome Evaluation:  Plan of Care Reviewed With: patient        Progress: no change  Outcome Evaluation: VSS. No complaint of pain. Alert to self and place only. Reorientation provided. Pt went for CT of abd and pelvis and ECHO tonight. No bowel movement or overt sign of bleeding. Voided in urinal. BGL monitored.

## 2023-09-21 NOTE — PROGRESS NOTES
[facility]   Hospitalist Progress Note  Date: 2023  Patient Name: Selvin Ferguson  : 1935  MRN: [id number]  Date of admission: 2023      Subjective   Subjective     Chief complaint: Anemia    Summary:  87-year-old male hospitalized on 2023 with hemoglobin of 5.7 with significant anemia and elevated BNP and elevation of his INR, he had pulmonary edema and congestion seen on imaging, he was placed on diuretics in between of receiving blood products, he required 2 units to stabilize hemoglobins, hematology, nephrology and GI consulted.  Concern for myelodysplastic syndrome versus anemia of chronic kidney disease    Interval follow-up: Seen and examined this morning, no acute distress, no acute major night events, appearing better, no chest pain or palpitations, breathing continues to improve, EGD yesterday shows hiatal hernia, no signs of bleeding, plans to pursue colonoscopy today.  Hemoglobin 8.7, creatinine 1.74, blood sugars 100s, potassium 3.8.  Telemetry reviewed sinus rhythm in the 80s with few PVCs.    Review of systems:  All systems reviewed and negative except for generalized fatigue, generalized weakness    Objective   Objective     Vitals:   Temp:  [97.5 °F (36.4 °C)-98.5 °F (36.9 °C)] 98.4 °F (36.9 °C)  Heart Rate:  [72-88] 80  Resp:  [16-18] 16  BP: (102-142)/(45-81) 103/81  Physical Exam                  Constitutional: Awake, alert lying in bed              Eyes: PERRLA, sclerae anicteric, no conjunctival injection, pallor              HENT: NCAT, mucous membranes moist              Neck: Supple, no thyromegaly, no lymphadenopathy, trachea midline              Respiratory: Clear to auscultation bilaterally, nonlabored respirations               Cardiovascular: RRR, no murmurs, rubs, or gallops, palpable pedal pulses bilaterally              Gastrointestinal: Positive bowel sounds, soft, nontender, nondistended              Musculoskeletal: No bilateral ankle edema, no  clubbing or cyanosis to extremities              Psychiatric: Appropriate affect, cooperative              Neurologic: Oriented to self, strength symmetric in all extremities, Cranial Nerves grossly intact to confrontation, speech clear              Skin: No rashes     Result Review    Result Review:  I have personally reviewed the pertinent results from the past 24 hours to 9/21/2023 16:35 EDT and agree with these findings:  [x]  Laboratory   CBC          9/19/2023    10:14 9/20/2023    06:16 9/20/2023    11:43 9/20/2023    18:18 9/20/2023    23:50 9/21/2023    04:49   CBC   WBC 7.33  6.20     5.05    RBC 2.90  3.00     2.81    Hemoglobin 8.9  9.4  9.0  8.9  8.9  8.7    Hematocrit 28.7  30.4  27.4  27.3  27.0  28.8    MCV 99.0  101.3     102.5    MCH 30.7  31.3     31.0    MCHC 31.0  30.9     30.2    RDW 21.2  21.4     20.5    Platelets 92  96     63      BMP          9/19/2023    10:14 9/20/2023    06:16 9/21/2023    04:49   BMP   BUN 45  42  33    Creatinine 2.14  1.94  1.74    Sodium 138  137  138    Potassium 4.3  3.7  3.8    Chloride 108  105  107    CO2 14.9  18.1  19.7    Calcium 9.0  8.9  8.8    LIVER FUNCTION TESTS:      Lab 09/21/23  0449 09/20/23  0616 09/19/23  1332 09/18/23  2107   TOTAL PROTEIN 6.8  --   --  7.2   ALBUMIN 4.0 4.1 3.9 4.4   GLOBULIN  --   --   --  2.8   ALT (SGPT) 10  --   --  13   AST (SGOT) 11  --   --  14   BILIRUBIN 1.8*  --   --  0.9   INDIRECT BILIRUBIN 1.4  --   --   --    BILIRUBIN DIRECT 0.4*  --   --   --    ALK PHOS 84  --   --  88         [x]  Microbiology No results found for: ACANTHNAEG, AFBCX, BPERTUSSISCX, BLOODCX  No results found for: BCIDPCR, CXREFLEX, CSFCX, CULTURETIS  No results found for: CULTURES, HSVCX, URCX  No results found for: EYECULTURE, GCCX, HSVCULTURE, LABHSV  No results found for: LEGIONELLA, MRSACX, MUMPSCX, MYCOPLASCX  No results found for: NOCARDIACX, STOOLCX  No results found for: THROATCX, UNSTIMCULT, URINECX, CULTURE, VZVCULTUR  No results found  for: VIRALCULTU, WOUNDCX    [x]  Radiology XR Chest 1 View    Result Date: 9/18/2023  PROCEDURE: XR CHEST 1 VW  COMPARISON: 5/13/2022.  INDICATIONS: SOA/SOB/shortness of air/shortness of breath; persistent cough.  FINDINGS: A single AP upright portable chest radiograph is provided for review.  There is possible new airspace opacification in the left lung base, which may represent pneumonia.  Aspiration pneumonia is possible.  Probably no acute infiltrate on the right.  Low lung volumes are present.  There may be borderline cardiac enlargement.  The patient has undergone median sternotomy and CABG surgery.  Fractured sternotomy wires are noted, as before.  Minimal, if any, pleural effusion is seen.  Thoracic aortic atherosclerotic change is seen.  No pneumothorax is identified.  There is generalized osteopenia.  There may be old healed right-sided rib fractures.       There is possible new left basilar pneumonia.  Consider close interval clinical and if necessary imaging follow-up to ensure a benign progression.      Please note that portions of this note were completed with a voice recognition program.  MARISSA WHITE JR, MD       Electronically Signed and Approved By: MARISSA WHITE JR, MD on 9/18/2023 at 22:44                [x]  EKG/Telemetry   []  Cardiology/Vascular   []  Pathology  [x]  Old records  []  Other:    Assessment & Plan   Assessment / Plan     Assessment/Plan:    Assessment:  Symptomatic anemia  Concern for acute blood loss anemia  Possible myelodysplastic syndrome versus anemia of chronic kidney disease  CKD stage IIIb/IV  Neurogenic bladder  Diabetes mellitus  Metabolic acidosis  Diabetes mellitus  BPH with LUTS  Heart failure with preserved ejection fraction  Essential hypertension  COPD  Dementia advanced  CAD    Plan:  Labs and imaging reviewed  Echo reviewed EF 56 to 60%  Continue tamsulosin 0.4 mg daily  Continue holding Lasix with euvolemic state  Continue metoprolol 25 mg daily  Continue  insulin sliding scale coverage  Colonoscopy today or tomorrow  We will check hemoglobin in the morning as we have some stability  Continue sodium bicarb tabs  Nephrology consulted discussed with Dr. Kent, recommendations appreciated  Gastroenterology consulted discussed Dr. Tong, recommendations appreciated  Hematology consulted discussed with , recommendations appreciated  Strict I's and O's  Daily weights  If hemoglobin drops less than 8, transfuse 1 unit PRBC  Follow-up neuroimaging ordered by hematology  A.m. labs  Full code  DVT prophylaxis with SCDs  Clinical course to dictate further management  Discussed with nurse at the bedside    DVT prophylaxis:  Mechanical DVT prophylaxis orders are present.    CODE STATUS:   Code Status (Patient has no pulse and is not breathing): CPR (Attempt to Resuscitate)  Medical Interventions (Patient has pulse or is breathing): Full Support    Electronically signed by Zoila Jung MD, 09/21/23, 4:50 PM EDT.    Portions of this documentation were transcribed electronically from a voice recognition software.  I confirm all data accurately represents the service(s) I performed at today's visit.

## 2023-09-21 NOTE — PROGRESS NOTES
[facility]     Progress Note    Patient Name: Selvin Ferguson  : 1935  MRN: [id number]  Primary Care Physician:  Kayla Renee APRN  Date of admission: 2023    Subjective   Subjective     Chief Complaint: I have discussed the case with his wife she says that a lot of work has been done at the [facility] from where he has been getting darbepoetin we will try to get the copies of his records    HPI: Stable but cannot cooperate in giving any history    Review of Systems   All systems were reviewed and negative except for: Reviewed    Objective   Objective     Vitals:   Temp:  [97 °F (36.1 °C)-98.5 °F (36.9 °C)] 98.3 °F (36.8 °C)  Heart Rate:  [68-88] 85  Resp:  [16-21] 18  BP: ()/(45-99) 112/48  Flow (L/min):  [2] 2    Physical Exam    Constitutional: Awake, alert   Eyes: PERRLA, sclerae anicteric, no conjunctival injection   HENT: NCAT, mucous membranes moist   Neck: Supple, no thyromegaly, no lymphadenopathy, trachea midline   Respiratory: Clear to auscultation bilaterally, nonlabored respirations    Cardiovascular: RRR, no murmurs, rubs, or gallops, palpable pedal pulses bilaterally   Gastrointestinal: Positive bowel sounds, soft, nontender, nondistended   Musculoskeletal: No bilateral ankle edema, no clubbing or cyanosis to extremities   Psychiatric: Appropriate affect, cooperative   Neurologic: Oriented x 3, strength symmetric in all extremities, Cranial Nerves grossly intact to confrontation, speech clear   Skin: No rashes     Result Review    Result Review:  I have personally reviewed the results from the time of this admission to 2023 08:21 EDT and agree with these findings:  Anemia laboratory  []  Microbiology  []  Radiology  []  EKG/Telemetry   []  Cardiology/Vascular   []  Pathology  []  Old records  []  Other:  Most notable findings include: Possible myelodysplastic syndrome    Assessment & Plan   Assessment / Plan     Brief Patient Summary:  Selvin Ferguson is a 87  y.o. male who patient with thrombocytopenia anemia CT scan results pending    Active Hospital Problems:  Active Hospital Problems    Diagnosis    • **Symptomatic anemia    • Essential hypertension    • CAD (coronary artery disease)    • History of dementia    • Chronic kidney disease, stage IV (severe)    • Type 2 diabetes mellitus    • Urinary retention    • Acute renal failure superimposed on chronic kidney disease        Plan:   Continue supportive care    DVT prophylaxis:  Mechanical DVT prophylaxis orders are present.    CODE STATUS:   Code Status (Patient has no pulse and is not breathing): CPR (Attempt to Resuscitate)  Medical Interventions (Patient has pulse or is breathing): Full Support    Disposition:  I expect patient to be discharged after the patient has been stabilized.    Electronically signed by Tristin Reece MD, 09/21/23, 8:21 AM EDT.      Part of this note may be an electronic transcription/translation of spoken language to printed text using the Dragon Dictation System.

## 2023-09-21 NOTE — THERAPY TREATMENT NOTE
Acute Care - Physical Therapy Treatment Note   [place]     Patient Name: Selvin Ferguson  : 1935  MRN: [id number]  Today's Date: 2023      Visit Dx:     ICD-10-CM ICD-9-CM   1. Symptomatic anemia  D64.9 285.9   2. Difficulty in walking  R26.2 719.7   3. Decreased activities of daily living (ADL)  Z78.9 V49.89     Patient Active Problem List   Diagnosis    Acute renal failure superimposed on chronic kidney disease    Uremia    Urinary retention    Bilateral hydronephrosis    Type 2 diabetes mellitus    Symptomatic anemia    Essential hypertension    CAD (coronary artery disease)    History of dementia    Chronic kidney disease, stage IV (severe)     Past Medical History:   Diagnosis Date    Dementia     Diabetes mellitus     Hyperlipidemia     Hypertension     Renal disorder      Past Surgical History:   Procedure Laterality Date    CATARACT EXTRACTION      COLONOSCOPY      CORONARY ARTERY BYPASS GRAFT      CYSTOSCOPY      ENDOSCOPY N/A 2023    Procedure: ESOPHAGOGASTRODUODENOSCOPY WITH COLD BIOPSIES;  Surgeon: Renny Tong MD;  Location: [facility] ENDOSCOPY;  Service: Gastroenterology;  Laterality: N/A;  HIATAL HERNIA    PROSTATE SURGERY       PT Assessment (last 12 hours)       PT Evaluation and Treatment       Row Name 23 1500          Physical Therapy Time and Intention    Subjective Information no complaints (P)   -AM     Document Type therapy note (daily note) (P)   -AM     Mode of Treatment individual therapy;physical therapy (P)   -AM     Patient Effort good (P)   -AM     Symptoms Noted During/After Treatment fatigue (P)   -AM       Row Name 23 1500          General Information    Patient Observations alert;cooperative;agree to therapy (P)   -AM       Row Name 23 1500          Bed Mobility    Comment, (Bed Mobility) pt reclined in recliner upon arrival (P)   -AM       Row Name 23 1500          Transfers    Comment, (Transfers) pt declined transfers (P)   -AM        Row Name 09/21/23 1500          Gait/Stairs (Locomotion)    Comment, (Gait/Stairs) pt declined ambulation (P)   -AM       Row Name 09/21/23 1500          Motor Skills    Motor Skills motor control/coordination interventions (P)   -AM     Motor Control/Coordination Interventions therapeutic exercise/ROM (P)   -AM     Therapeutic Exercise hip;knee;ankle (P)   -AM       Row Name 09/21/23 1500          Hip (Therapeutic Exercise)    Hip (Therapeutic Exercise) strengthening exercise (P)   -AM     Hip Strengthening (Therapeutic Exercise) other (see comments) (P)   SLR 2x10, hip abduction supine 2x10  -AM       Row Name 09/21/23 1500          Knee (Therapeutic Exercise)    Knee (Therapeutic Exercise) strengthening exercise (P)   -AM     Knee Strengthening (Therapeutic Exercise) heel slides;supine;2 sets;other (see comments) (P)   8 reps  -AM       Row Name 09/21/23 1500          Ankle (Therapeutic Exercise)    Ankle (Therapeutic Exercise) strengthening exercise;AROM (active range of motion) (P)   -AM     Ankle AROM (Therapeutic Exercise) other (see comments) (P)   ankle pumpes in supine 2x20  -AM       Row Name 09/21/23 1500          Progress Summary (PT)    Progress Toward Functional Goals (PT) progress toward functional goals is good (P)   -AM     Daily Progress Summary (PT) Pt tolerated therapeutic exercises well. No complaints reported by pt throughout treatment. (P)   -AM               User Key  (r) = Recorded By, (t) = Taken By, (c) = Cosigned By      Initials Name Provider Type    AM Bia Stone, PT Student PT Student                    Physical Therapy Education       Title: PT OT SLP Therapies (In Progress)       Topic: Physical Therapy (Done)       Point: Mobility training (Done)       Learning Progress Summary             Patient Acceptance, E,TB, VU by  at 9/20/2023 1114                         Point: Body mechanics (Done)       Learning Progress Summary             Patient Acceptance, E,TB, VU by   at 9/20/2023 1114                         Point: Precautions (Done)       Learning Progress Summary             Patient Acceptance, E,TB, VU by  at 9/20/2023 1114                                         User Key       Initials Effective Dates Name Provider Type Discipline     08/16/23 -  Blake Escobar, PT Student PT Student PT                  PT Recommendation and Plan     Progress Summary (PT)  Progress Toward Functional Goals (PT): (P) progress toward functional goals is good  Daily Progress Summary (PT): (P) Pt tolerated therapeutic exercises well. No complaints reported by pt throughout treatment.   Outcome Measures       Row Name 09/21/23 1500 09/20/23 1100          How much help from another person do you currently need...    Turning from your back to your side while in flat bed without using bedrails? 4 (P)   -AM 4  -DP (r) RH (t) DP (c)     Moving from lying on back to sitting on the side of a flat bed without bedrails? 4 (P)   -AM 4  -DP (r) RH (t) DP (c)     Moving to and from a bed to a chair (including a wheelchair)? 3 (P)   -AM 3  -DP (r) RH (t) DP (c)     Standing up from a chair using your arms (e.g., wheelchair, bedside chair)? 3 (P)   -AM 3  -DP (r) RH (t) DP (c)     Climbing 3-5 steps with a railing? 3 (P)   -AM 2  -DP (r) RH (t) DP (c)     To walk in hospital room? 3 (P)   -AM 3  -DP (r) RH (t) DP (c)     AM-PAC 6 Clicks Score (PT) 20 (P)   -AM 19  -DP (r) RH (t)        Functional Assessment    Outcome Measure Options AM-PAC 6 Clicks Basic Mobility (PT) (P)   -AM --               User Key  (r) = Recorded By, (t) = Taken By, (c) = Cosigned By      Initials Name Provider Type    DP Whitney Thomas, PT Physical Therapist     Blake Escobar, PT Student PT Student    Bia Jain, PT Student PT Student                     Time Calculation:    PT Charges       Row Name 09/21/23 1509             Time Calculation    PT Received On 09/21/23 (P)   -AM         Timed Charges     61408 - PT Therapeutic Exercise Minutes 12 (P)   -AM         Total Minutes    Timed Charges Total Minutes 12 (P)   -AM       Total Minutes 12 (P)   -AM                User Key  (r) = Recorded By, (t) = Taken By, (c) = Cosigned By      Initials Name Provider Type    Bia Jain PT Student PT Student                  Therapy Charges for Today       Code Description Service Date Service Provider Modifiers Qty    97491123431 HC PT THER PROC EA 15 MIN 9/21/2023 Bia Stone, PT Student GP 1            PT G-Codes  Outcome Measure Options: (P) AM-PAC 6 Clicks Basic Mobility (PT)  AM-PAC 6 Clicks Score (PT): (P) 20  AM-PAC 6 Clicks Score (OT): 17    Bia Stone PT Student  9/21/2023

## 2023-09-21 NOTE — THERAPY TREATMENT NOTE
Patient Name: Selvin Ferguson  : 1935    MRN: [id number]                              Today's Date: 2023       Admit Date: 2023    Visit Dx:     ICD-10-CM ICD-9-CM   1. Symptomatic anemia  D64.9 285.9   2. Difficulty in walking  R26.2 719.7   3. Decreased activities of daily living (ADL)  Z78.9 V49.89     Patient Active Problem List   Diagnosis    Acute renal failure superimposed on chronic kidney disease    Uremia    Urinary retention    Bilateral hydronephrosis    Type 2 diabetes mellitus    Symptomatic anemia    Essential hypertension    CAD (coronary artery disease)    History of dementia    Chronic kidney disease, stage IV (severe)     Past Medical History:   Diagnosis Date    Dementia     Diabetes mellitus     Hyperlipidemia     Hypertension     Renal disorder      Past Surgical History:   Procedure Laterality Date    CATARACT EXTRACTION      COLONOSCOPY      CORONARY ARTERY BYPASS GRAFT      CYSTOSCOPY      ENDOSCOPY N/A 2023    Procedure: ESOPHAGOGASTRODUODENOSCOPY WITH COLD BIOPSIES;  Surgeon: Renny Tong MD;  Location: [facility] ENDOSCOPY;  Service: Gastroenterology;  Laterality: N/A;  HIATAL HERNIA    PROSTATE SURGERY        General Information       Row Name 23 1443          OT Time and Intention    Document Type therapy note (daily note)  -     Mode of Treatment individual therapy;occupational therapy  -               User Key  (r) = Recorded By, (t) = Taken By, (c) = Cosigned By      Initials Name Provider Type     Marla Fox OT Occupational Therapist                     Mobility/ADL's       Row Name 23 1443          Bed Mobility    Bed Mobility supine-sit  -     Supine-Sit [place] (Bed Mobility) standby assist  -     Bed Mobility, Safety Issues decreased use of arms for pushing/pulling;decreased use of legs for bridging/pushing  -     Assistive Device (Bed Mobility) bed rails;head of bed elevated  -       Row Name 23 1442           Transfers    Transfers sit-stand transfer;stand-sit transfer;bed-chair transfer  -LF       Row Name 09/21/23 1443          Bed-Chair Transfer    Bed-Chair [place] (Transfers) contact guard  -LF     Assistive Device (Bed-Chair Transfers) walker, front-wheeled  -LF       Row Name 09/21/23 1443          Sit-Stand Transfer    Sit-Stand [place] (Transfers) contact guard  -LF     Assistive Device (Sit-Stand Transfers) walker, front-wheeled  -LF       Row Name 09/21/23 1443          Stand-Sit Transfer    Stand-Sit [place] (Transfers) contact guard  -LF     Assistive Device (Stand-Sit Transfers) walker, front-wheeled  -LF       Row Name 09/21/23 1443          Activities of Daily Living    BADL Assessment/Intervention upper body dressing;lower body dressing;toileting  -LF       Row Name 09/21/23 1443          Upper Body Dressing Assessment/Training    [place] Level (Upper Body Dressing) upper body dressing skills;don;pull-over garment;standby assist  hospital gown  -     Position (Upper Body Dressing) unsupported sitting  -LF       Row Name 09/21/23 1443          Lower Body Dressing Assessment/Training    [place] Level (Lower Body Dressing) lower body dressing skills;don;pants/bottoms;minimum assist (75% patient effort)  -LF     Position (Lower Body Dressing) unsupported sitting;edge of bed sitting;supported standing  -LF       Row Name 09/21/23 1443          Toileting Assessment/Training    [place] Level (Toileting) toileting skills;adjust/manage clothing;change pad/brief;perform perineal hygiene;maximum assist (25% patient effort)  -LF     Position (Toileting) unsupported sitting;edge of bed sitting;supported standing  -     Comment, (Toileting) Patient incontinent of bladder upon OT's arrival.  -LF               User Key  (r) = Recorded By, (t) = Taken By, (c) = Cosigned By      Initials Name Provider Type    Marla López OT Occupational Therapist                    Obj/Interventions       Row Name 09/21/23 1444          Motor Skills    Motor Skills functional endurance  -     Functional Endurance Fair  -LF       Row Name 09/21/23 1444          Balance    Balance Assessment sitting dynamic balance;standing dynamic balance  -     Dynamic Sitting Balance supervision  -     Position, Sitting Balance unsupported;sitting edge of bed  -     Dynamic Standing Balance contact guard  -     Position/Device Used, Standing Balance supported;walker, front-wheeled  -     Balance Interventions sitting;standing;sit to stand;supported;dynamic;occupation based/functional task;weight shifting activity  -               User Key  (r) = Recorded By, (t) = Taken By, (c) = Cosigned By      Initials Name Provider Type     Marla Fox, OT Occupational Therapist                   Goals/Plan    No documentation.                  Clinical Impression       [facility] Name 09/21/23 1445          Pain Assessment    Additional Documentation Pain Scale: FACES Pre/Post-Treatment (Group)  -LF       Row Name 09/21/23 1445          Pain Scale: FACES Pre/Post-Treatment    Pain: FACES Scale, Pretreatment 0-->no hurt  -     Posttreatment Pain Rating 0-->no hurt  -LF       Row Name 09/21/23 1445          Plan of Care Review    Plan of Care Reviewed With patient  -     Progress improving  -     Outcome Evaluation Patient remains pleasantly confused. He was incontinent of bladder upon OT's arrival, requiring assist with anastasia-care, UB, and LB dressing. He would benefit from continued OT services to maximize independence.  -       Row Name 09/21/23 1445          Vital Signs    O2 Delivery Pre Treatment room air  -     O2 Delivery Intra Treatment room air  -LF     O2 Delivery Post Treatment room air  -               User Key  (r) = Recorded By, (t) = Taken By, (c) = Cosigned By      Initials Name Provider Type     Marla Fox, OT Occupational Therapist                   Outcome Measures        Row Name 09/21/23 1446          How much help from another is currently needed...    Putting on and taking off regular lower body clothing? 3  -LF     Bathing (including washing, rinsing, and drying) 3  -LF     Toileting (which includes using toilet bed pan or urinal) 1  -LF     Putting on and taking off regular upper body clothing 3  -LF     Taking care of personal grooming (such as brushing teeth) 3  -LF     Eating meals 4  -LF     AM-PAC 6 Clicks Score (OT) 17  -LF       Row Name 09/21/23 1446          Functional Assessment    Outcome Measure Options AM-PAC 6 Clicks Daily Activity (OT);Optimal Instrument  -LF       Row Name 09/21/23 1446          Optimal Instrument    Optimal Instrument Optimal - 3  -LF     Bending/Stooping 2  -LF     Standing 2  -LF     Reaching 1  -LF     From the list, choose the 3 activities you would most like to be able to do without any difficulty Bending/stooping;Standing;Reaching  -LF     Total Score Optimal - 3 5  -LF               User Key  (r) = Recorded By, (t) = Taken By, (c) = Cosigned By      Initials Name Provider Type    LF Marla Fox, ROSIE Occupational Therapist                    Occupational Therapy Education       Title: PT OT SLP Therapies (In Progress)       Topic: Occupational Therapy (In Progress)       Point: ADL training (In Progress)       Description:   Instruct learner(s) on proper safety adaptation and remediation techniques during self care or transfers.   Instruct in proper use of assistive devices.                  Learning Progress Summary             Patient Acceptance, E,TB, NR by  at 9/20/2023 1348                         Point: Precautions (In Progress)       Description:   Instruct learner(s) on prescribed precautions during self-care and functional transfers.                  Learning Progress Summary             Patient Acceptance, E,TB, NR by  at 9/20/2023 1348                         Point: Body mechanics (In Progress)       Description:    Instruct learner(s) on proper positioning and spine alignment during self-care, functional mobility activities and/or exercises.                  Learning Progress Summary             Patient Acceptance, E,TB, NR by  at 9/20/2023 1348                                         User Key       Initials Effective Dates Name Provider Type Discipline     06/16/21 -  Marla Fox OT Occupational Therapist OT                  OT Recommendation and Plan  Planned Therapy Interventions (OT): activity tolerance training, patient/caregiver education/training, BADL retraining, functional balance retraining, occupation/activity based interventions, strengthening exercise, transfer/mobility retraining  Therapy Frequency (OT): 5 times/wk  Plan of Care Review  Plan of Care Reviewed With: patient  Progress: improving  Outcome Evaluation: Patient remains pleasantly confused. He was incontinent of bladder upon OT's arrival, requiring assist with anastasia-care, UB, and LB dressing. He would benefit from continued OT services to maximize independence.     Time Calculation:   Evaluation Complexity (OT)  Review Occupational Profile/Medical/Therapy History Complexity: brief/low complexity  Assessment, Occupational Performance/Identification of Deficit Complexity: 3-5 performance deficits  Clinical Decision Making Complexity (OT): problem focused assessment/low complexity  Overall Complexity of Evaluation (OT): low complexity     Time Calculation- OT       Row Name 09/21/23 1446             Time Calculation- OT    OT Received On 09/21/23  -LF      OT Goal Re-Cert Due Date 09/29/23  -         Timed Charges    98371 - OT Therapeutic Activity Minutes 8  -LF      01290 - OT Self Care/Mgmt Minutes 10  -LF         Total Minutes    Timed Charges Total Minutes 18  -LF       Total Minutes 18  -LF                User Key  (r) = Recorded By, (t) = Taken By, (c) = Cosigned By      Initials Name Provider Type     Marla Fox OT Occupational  Therapist                  Therapy Charges for Today       Code Description Service Date Service Provider Modifiers Qty    70624963170  OT EVAL LOW COMPLEXITY 3 9/20/2023 Marla Fox OT GO 1    [id number]  OT SELF CARE/MGMT/TRAIN EA 15 MIN 9/21/2023 Marla Fox OT  1                 Marla Fox OT  9/21/2023

## 2023-09-22 LAB
ALBUMIN SERPL-MCNC: 3.7 G/DL (ref 3.5–5.2)
ALP SERPL-CCNC: 76 U/L (ref 39–117)
ALT SERPL W P-5'-P-CCNC: 8 U/L (ref 1–41)
ANION GAP SERPL CALCULATED.3IONS-SCNC: 9.3 MMOL/L (ref 5–15)
AST SERPL-CCNC: 9 U/L (ref 1–40)
BASOPHILS # BLD AUTO: 0.11 10*3/MM3 (ref 0–0.2)
BASOPHILS NFR BLD AUTO: 2 % (ref 0–1.5)
BILIRUB CONJ SERPL-MCNC: 0.3 MG/DL (ref 0–0.3)
BILIRUB INDIRECT SERPL-MCNC: 1.1 MG/DL
BILIRUB SERPL-MCNC: 1.4 MG/DL (ref 0–1.2)
BUN SERPL-MCNC: 28 MG/DL (ref 8–23)
BUN/CREAT SERPL: 16.4 (ref 7–25)
CALCIUM SPEC-SCNC: 8.6 MG/DL (ref 8.6–10.5)
CHLORIDE SERPL-SCNC: 106 MMOL/L (ref 98–107)
CO2 SERPL-SCNC: 21.7 MMOL/L (ref 22–29)
CREAT SERPL-MCNC: 1.71 MG/DL (ref 0.76–1.27)
CYTO UR: NORMAL
DEPRECATED RDW RBC AUTO: 67.2 FL (ref 37–54)
EGFRCR SERPLBLD CKD-EPI 2021: 38.3 ML/MIN/1.73
EOSINOPHIL # BLD AUTO: 0.2 10*3/MM3 (ref 0–0.4)
EOSINOPHIL NFR BLD AUTO: 3.6 % (ref 0.3–6.2)
ERYTHROCYTE [DISTWIDTH] IN BLOOD BY AUTOMATED COUNT: 19.7 % (ref 12.3–15.4)
GLUCOSE BLDC GLUCOMTR-MCNC: 125 MG/DL (ref 70–99)
GLUCOSE BLDC GLUCOMTR-MCNC: 149 MG/DL (ref 70–99)
GLUCOSE BLDC GLUCOMTR-MCNC: 172 MG/DL (ref 70–99)
GLUCOSE SERPL-MCNC: 130 MG/DL (ref 65–99)
HCT VFR BLD AUTO: 24 % (ref 37.5–51)
HGB BLD-MCNC: 7.9 G/DL (ref 13–17.7)
IMM GRANULOCYTES # BLD AUTO: 0.03 10*3/MM3 (ref 0–0.05)
IMM GRANULOCYTES NFR BLD AUTO: 0.5 % (ref 0–0.5)
LAB AP CASE REPORT: NORMAL
LAB AP CLINICAL INFORMATION: NORMAL
LYMPHOCYTES # BLD AUTO: 1.7 10*3/MM3 (ref 0.7–3.1)
LYMPHOCYTES NFR BLD AUTO: 31 % (ref 19.6–45.3)
MAGNESIUM SERPL-MCNC: 1.9 MG/DL (ref 1.6–2.4)
MCH RBC QN AUTO: 31.3 PG (ref 26.6–33)
MCHC RBC AUTO-ENTMCNC: 32.9 G/DL (ref 31.5–35.7)
MCV RBC AUTO: 95.2 FL (ref 79–97)
MONOCYTES # BLD AUTO: 0.28 10*3/MM3 (ref 0.1–0.9)
MONOCYTES NFR BLD AUTO: 5.1 % (ref 5–12)
NEUTROPHILS NFR BLD AUTO: 3.17 10*3/MM3 (ref 1.7–7)
NEUTROPHILS NFR BLD AUTO: 57.8 % (ref 42.7–76)
NRBC BLD AUTO-RTO: 0 /100 WBC (ref 0–0.2)
PATH REPORT.FINAL DX SPEC: NORMAL
PATH REPORT.GROSS SPEC: NORMAL
PHOSPHATE SERPL-MCNC: 2 MG/DL (ref 2.5–4.5)
PLATELET # BLD AUTO: 56 10*3/MM3 (ref 140–450)
PMV BLD AUTO: 12.3 FL (ref 6–12)
POTASSIUM SERPL-SCNC: 3.6 MMOL/L (ref 3.5–5.2)
PROT SERPL-MCNC: 6.5 G/DL (ref 6–8.5)
RBC # BLD AUTO: 2.52 10*6/MM3 (ref 4.14–5.8)
SODIUM SERPL-SCNC: 137 MMOL/L (ref 136–145)
WBC NRBC COR # BLD: 5.49 10*3/MM3 (ref 3.4–10.8)

## 2023-09-22 PROCEDURE — 97535 SELF CARE MNGMENT TRAINING: CPT

## 2023-09-22 PROCEDURE — 84100 ASSAY OF PHOSPHORUS: CPT | Performed by: INTERNAL MEDICINE

## 2023-09-22 PROCEDURE — 63710000001 INSULIN LISPRO (HUMAN) PER 5 UNITS: Performed by: INTERNAL MEDICINE

## 2023-09-22 PROCEDURE — 82948 REAGENT STRIP/BLOOD GLUCOSE: CPT

## 2023-09-22 PROCEDURE — 80076 HEPATIC FUNCTION PANEL: CPT | Performed by: FAMILY MEDICINE

## 2023-09-22 PROCEDURE — 80048 BASIC METABOLIC PNL TOTAL CA: CPT | Performed by: INTERNAL MEDICINE

## 2023-09-22 PROCEDURE — 97530 THERAPEUTIC ACTIVITIES: CPT

## 2023-09-22 PROCEDURE — 83735 ASSAY OF MAGNESIUM: CPT | Performed by: FAMILY MEDICINE

## 2023-09-22 PROCEDURE — 85025 COMPLETE CBC W/AUTO DIFF WBC: CPT | Performed by: FAMILY MEDICINE

## 2023-09-22 PROCEDURE — 99232 SBSQ HOSP IP/OBS MODERATE 35: CPT | Performed by: FAMILY MEDICINE

## 2023-09-22 RX ORDER — FENTANYL/ROPIVACAINE/NS/PF 2-625MCG/1
15 PLASTIC BAG, INJECTION (ML) EPIDURAL
Status: DISCONTINUED | OUTPATIENT
Start: 2023-09-22 | End: 2023-09-22

## 2023-09-22 RX ADMIN — TAMSULOSIN HYDROCHLORIDE 0.4 MG: 0.4 CAPSULE ORAL at 08:00

## 2023-09-22 RX ADMIN — SODIUM BICARBONATE 650 MG TABLET 1300 MG: at 08:02

## 2023-09-22 RX ADMIN — FAMOTIDINE 20 MG: 20 TABLET ORAL at 08:00

## 2023-09-22 RX ADMIN — Medication 10 ML: at 08:02

## 2023-09-22 RX ADMIN — POTASSIUM PHOSPHATE, MONOBASIC 1000 MG: 500 TABLET, SOLUBLE ORAL at 10:28

## 2023-09-22 RX ADMIN — Medication 10 ML: at 20:17

## 2023-09-22 RX ADMIN — SODIUM BICARBONATE 650 MG TABLET 1300 MG: at 20:16

## 2023-09-22 RX ADMIN — METOPROLOL SUCCINATE 25 MG: 25 TABLET, EXTENDED RELEASE ORAL at 08:00

## 2023-09-22 RX ADMIN — POTASSIUM PHOSPHATE, MONOBASIC 1000 MG: 500 TABLET, SOLUBLE ORAL at 20:16

## 2023-09-22 RX ADMIN — POTASSIUM PHOSPHATE, MONOBASIC POTASSIUM PHOSPHATE, DIBASIC 15 MMOL: 224; 236 INJECTION, SOLUTION, CONCENTRATE INTRAVENOUS at 08:00

## 2023-09-22 RX ADMIN — INSULIN LISPRO 2 UNITS: 100 INJECTION, SOLUTION INTRAVENOUS; SUBCUTANEOUS at 17:00

## 2023-09-22 RX ADMIN — POTASSIUM PHOSPHATE, MONOBASIC 1000 MG: 500 TABLET, SOLUBLE ORAL at 16:56

## 2023-09-22 NOTE — PROGRESS NOTES
[facility]     Progress Note    Patient Name: Selvin Ferguson  : 1935  MRN: [id number]  Primary Care Physician:  Kayla Renee APRN  Date of admission: 2023    Subjective patient is doing well and has no new concerns or issues  He is not drinking GoLytely so he is not clean for colonoscopy      Review of Systems  Constitutional:        Weakness tiredness fatigue  Eyes:                       No blurry vision, eye discharge, eye irritation, eye pain  HEENT:                   No acute hair loss, earache and discharge, nasal congestion or discharge, sore throat, postnasal drip  Respiratory:           No shortness of breath coughing sputum production wheezing hemoptysis pleuritic chest pain  Cardiovascular:     No chest pain, orthopnea, PND, dizziness, palpitation, lower extremity edema  Gastrointestinal:   No nausea vomiting diarrhea abdominal pain constipation  Genitourinary:       No urinary incontinence, hesitancy, frequency, urgency, dysuria  Hematologic:         No bruising, bleeding, pallor, lymphadenopathy  Endocrine:            No coldness, hot flashes, polyuria, abnormal hair growth  Musculoskeletal:    No body pains, aches, arthritic pains, muscle pain ,muscle wasting  Psychiatric:          No low or high mood, anxiety, hallucinations, delusions  Skin.                      No rash, ulcers, bruising, itching  Neurological:        No confusion, headache, focal weakness, numbness, dysphasia    Objective   Objective     Vitals:   Temp:  [98.2 °F (36.8 °C)-98.5 °F (36.9 °C)] 98.2 °F (36.8 °C)  Heart Rate:  [80-84] 82  Resp:  [16-20] 18  BP: (103-136)/(48-81) 131/49  Physical Exam    Constitutional: Awake, alert responsive, conversant, no obvious distress              Psychiatric:  Appropriate affect, cooperative   Neurologic:  Awake alert ,oriented x 3, strength symmetric in all extremities, Cranial Nerves grossly intact to confrontation, speech clear   Eyes:   PERRLA, sclerae anicteric, no  conjunctival injection   HEENT:  Moist mucous membranes, no nasal or eye discharge, no throat congestion   Neck:   Supple, no thyromegaly, no lymphadenopathy, trachea midline, no elevated JVD   Respiratory:  Clear to auscultation bilaterally, nonlabored respirations    Cardiovascular: RRR, no murmurs, rubs, or gallops, palpable pedal pulses bilaterally, No bilateral ankle edema   Gastrointestinal: Positive bowel sounds, soft, nontender, nondistended, no organomegaly   Musculoskeletal:  No clubbing or cyanosis to extremities,muscle wasting, joint swelling, muscle weakness             Skin:                      No rashes, bruising, skin ulcers, petechiae or ecchymosis    Result Review    Result Review:  I have personally reviewed the results from the time of this admission to 9/22/2023 09:27 EDT and agree with these findings:  []  Laboratory  []  Microbiology  []  Radiology  []  EKG/Telemetry   []  Cardiology/Vascular   []  Pathology  []  Old records  []  Other:    Assessment & Plan   Assessment / Plan       Active Hospital Problems:    Active Hospital Problems    Diagnosis  POA   • **Symptomatic anemia [D64.9]  Yes   • Essential hypertension [I10]  Unknown   • CAD (coronary artery disease) [I25.10]  Unknown   • History of dementia [Z86.59]  Not Applicable   • Chronic kidney disease, stage IV (severe) [N18.4]  Unknown      Baseline creatinine 2.0-2.4     • Type 2 diabetes mellitus [E11.9]  Yes   • Urinary retention [R33.9]  Yes   • Acute renal failure superimposed on chronic kidney disease [N17.9, N18.9]  Yes     Patient's baseline creatinine is around 2 and he follows with Dr. 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

## 2023-09-22 NOTE — THERAPY TREATMENT NOTE
Patient Name: Selvin Ferguson  : 1935    MRN: [id number]                              Today's Date: 2023       Admit Date: 2023    Visit Dx:     ICD-10-CM ICD-9-CM   1. Symptomatic anemia  D64.9 285.9   2. Difficulty in walking  R26.2 719.7   3. Decreased activities of daily living (ADL)  Z78.9 V49.89     Patient Active Problem List   Diagnosis    Acute renal failure superimposed on chronic kidney disease    Uremia    Urinary retention    Bilateral hydronephrosis    Type 2 diabetes mellitus    Symptomatic anemia    Essential hypertension    CAD (coronary artery disease)    History of dementia    Chronic kidney disease, stage IV (severe)     Past Medical History:   Diagnosis Date    Dementia     Diabetes mellitus     Hyperlipidemia     Hypertension     Renal disorder      Past Surgical History:   Procedure Laterality Date    CATARACT EXTRACTION      COLONOSCOPY      CORONARY ARTERY BYPASS GRAFT      CYSTOSCOPY      ENDOSCOPY N/A 2023    Procedure: ESOPHAGOGASTRODUODENOSCOPY WITH COLD BIOPSIES;  Surgeon: Renny Tong MD;  Location: [facility] ENDOSCOPY;  Service: Gastroenterology;  Laterality: N/A;  HIATAL HERNIA    PROSTATE SURGERY        General Information       Row Name 23 1422          OT Time and Intention    Document Type therapy note (daily note)  -     Mode of Treatment individual therapy;occupational therapy  -               User Key  (r) = Recorded By, (t) = Taken By, (c) = Cosigned By      Initials Name Provider Type     Marla Fox OT Occupational Therapist                     Mobility/ADL's       Row Name 23 1422          Bed Mobility    Bed Mobility supine-sit  -     Supine-Sit [place] (Bed Mobility) standby assist  -     Bed Mobility, Safety Issues decreased use of arms for pushing/pulling;decreased use of legs for bridging/pushing  -     Assistive Device (Bed Mobility) bed rails;head of bed elevated  -       Row Name 23 142           Transfers    Transfers sit-stand transfer;stand-sit transfer;bed-chair transfer  -       Row Name 09/22/23 1422          Bed-Chair Transfer    Bed-Chair [place] (Transfers) contact guard  -LF     Assistive Device (Bed-Chair Transfers) walker, front-wheeled  -LF       Row Name 09/22/23 1422          Sit-Stand Transfer    Sit-Stand [place] (Transfers) contact guard  -LF     Assistive Device (Sit-Stand Transfers) walker, front-wheeled  -LF       Row Name 09/22/23 1422          Stand-Sit Transfer    Stand-Sit [place] (Transfers) contact guard  -LF     Assistive Device (Stand-Sit Transfers) walker, front-wheeled  -LF       Row Name 09/22/23 1422          Functional Mobility    Functional Mobility- Ind. Level contact guard assist  -     Functional Mobility-Distance (Feet) 20  -     Functional Mobility- Safety Issues loses balance backward  -     Functional Mobility- Comment Patient completed functional mobility in room from EOB, to doorway, to recliner with CGA using RW. Min cues throughout for safety and use of RW.  -       Row Name 09/22/23 1422          Activities of Daily Living    BADL Assessment/Intervention upper body dressing;lower body dressing;toileting  -       Row Name 09/22/23 1422          Upper Body Dressing Assessment/Training    [place] Level (Upper Body Dressing) upper body dressing skills;don;pull-over garment;standby assist  hospital gown  -     Position (Upper Body Dressing) unsupported sitting;edge of bed sitting  -       Row Name 09/22/23 1422          Lower Body Dressing Assessment/Training    [place] Level (Lower Body Dressing) lower body dressing skills;don;pants/bottoms;minimum assist (75% patient effort)  -     Position (Lower Body Dressing) unsupported sitting;edge of bed sitting;supported standing  -       Row Name 09/22/23 1422          Toileting Assessment/Training    [place] Level (Toileting) toileting skills;adjust/manage  clothing;change pad/brief;perform perineal hygiene;maximum assist (25% patient effort)  -     Position (Toileting) unsupported sitting;edge of bed sitting;supported standing  -     Comment, (Toileting) Patient again incontinent of bladder upono OT's arrival.  -               User Key  (r) = Recorded By, (t) = Taken By, (c) = Cosigned By      Initials Name Provider Type     Marla Fox OT Occupational Therapist                   Obj/Interventions       Row Name 09/22/23 [facility]          Motor Skills    Functional Endurance Fair  -LF       Row Name 09/22/23 [facility]          Balance    Balance Assessment sitting dynamic balance;standing dynamic balance  -     Dynamic Sitting Balance supervision  -     Position, Sitting Balance unsupported;sitting edge of bed  -     Dynamic Standing Balance contact guard  -     Position/Device Used, Standing Balance supported;walker, front-wheeled  -     Balance Interventions sitting;standing;sit to stand;supported;dynamic;occupation based/functional task;weight shifting activity  -               User Key  (r) = Recorded By, (t) = Taken By, (c) = Cosigned By      Initials Name Provider Type     Marla Fox OT Occupational Therapist                   Goals/Plan    No documentation.                  Clinical Impression       Row Name 09/22/23 1425          Pain Assessment    Additional Documentation Pain Scale: FACES Pre/Post-Treatment (Group)  -LF       Row Name 09/22/23 1425          Pain Scale: FACES Pre/Post-Treatment    Pain: FACES Scale, Pretreatment 0-->no hurt  -     Posttreatment Pain Rating 0-->no hurt  -LF       Row Name 09/22/23 1425          Plan of Care Review    Plan of Care Reviewed With patient  -     Progress improving  -     Outcome Evaluation Patient again incontinent of bladder upon OT's arrival, requiring max assist for anastasia-care and min assist for LB donning brief in preparation for functional transfer/mobility traning. He then  completed ~20ft of functional mobility in room with CGA and RW to simulate household distance. He would benefit from continued OT services to maximize independence.  -LF       Row Name 09/22/23 1425          Vital Signs    O2 Delivery Pre Treatment room air  -LF     O2 Delivery Intra Treatment room air  -LF     O2 Delivery Post Treatment room air  -LF               User Key  (r) = Recorded By, (t) = Taken By, (c) = Cosigned By      Initials Name Provider Type    LF Marla Fox, OT Occupational Therapist                   Outcome Measures       Row Name 09/22/23 1427          How much help from another is currently needed...    Putting on and taking off regular lower body clothing? 3  -LF     Bathing (including washing, rinsing, and drying) 3  -LF     Toileting (which includes using toilet bed pan or urinal) 1  -LF     Putting on and taking off regular upper body clothing 3  -LF     Taking care of personal grooming (such as brushing teeth) 3  -LF     Eating meals 4  -LF     AM-PAC 6 Clicks Score (OT) 17  -LF       Row Name 09/22/23 1112          How much help from another person do you currently need...    Turning from your back to your side while in flat bed without using bedrails? 4  -PW     Moving from lying on back to sitting on the side of a flat bed without bedrails? 4  -PW     Moving to and from a bed to a chair (including a wheelchair)? 3  -PW     Standing up from a chair using your arms (e.g., wheelchair, bedside chair)? 3  -PW     Climbing 3-5 steps with a railing? 3  -PW     To walk in hospital room? 3  -PW     AM-PAC 6 Clicks Score (PT) 20  -PW     Highest level of mobility 6 --> Walked 10 steps or more  -PW       Row Name 09/22/23 1427          Functional Assessment    Outcome Measure Options AM-PAC 6 Clicks Daily Activity (OT);Optimal Instrument  -LF       Row Name 09/22/23 1427          Optimal Instrument    Optimal Instrument Optimal - 3  -LF     Bending/Stooping 2  -LF     Standing 2  -LF      Reaching 1  -LF     From the list, choose the 3 activities you would most like to be able to do without any difficulty Bending/stooping;Standing;Reaching  -LF     Total Score Optimal - 3 5  -LF               User Key  (r) = Recorded By, (t) = Taken By, (c) = Cosigned By      Initials Name Provider Type     Marla Fox OT Occupational Therapist    Erika Szymanski, RN Registered Nurse                    Occupational Therapy Education       Title: PT OT SLP Therapies (In Progress)       Topic: Occupational Therapy (In Progress)       Point: ADL training (In Progress)       Description:   Instruct learner(s) on proper safety adaptation and remediation techniques during self care or transfers.   Instruct in proper use of assistive devices.                  Learning Progress Summary             Patient Acceptance, E,TB, NR by  at 9/20/2023 1348                         Point: Precautions (In Progress)       Description:   Instruct learner(s) on prescribed precautions during self-care and functional transfers.                  Learning Progress Summary             Patient Acceptance, E,TB, NR by  at 9/20/2023 1348                         Point: Body mechanics (In Progress)       Description:   Instruct learner(s) on proper positioning and spine alignment during self-care, functional mobility activities and/or exercises.                  Learning Progress Summary             Patient Acceptance, E,TB, NR by  at 9/20/2023 1348                                         User Key       Initials Effective Dates Name Provider Type [facility] 06/16/21 -  Marla Fox OT Occupational Therapist OT                  OT Recommendation and Plan  Planned Therapy Interventions (OT): activity tolerance training, patient/caregiver education/training, BADL retraining, functional balance retraining, occupation/activity based interventions, strengthening exercise, transfer/mobility retraining  Therapy Frequency  (OT): 5 times/wk  Plan of Care Review  Plan of Care Reviewed With: patient  Progress: improving  Outcome Evaluation: Patient again incontinent of bladder upon OT's arrival, requiring max assist for anastasia-care and min assist for LB claudy brown in preparation for functional transfer/mobility traning. He then completed ~20ft of functional mobility in room with CGA and RW to simulate household distance. He would benefit from continued OT services to maximize independence.     Time Calculation:   Evaluation Complexity (OT)  Review Occupational Profile/Medical/Therapy History Complexity: brief/low complexity  Assessment, Occupational Performance/Identification of Deficit Complexity: 3-5 performance deficits  Clinical Decision Making Complexity (OT): problem focused assessment/low complexity  Overall Complexity of Evaluation (OT): low complexity     Time Calculation- OT       Row Name 09/22/23 1428             Time Calculation- OT    OT Received On 09/22/23  -LF      OT Goal Re-Cert Due Date 09/29/23  -LF         Timed Charges    24663 - OT Therapeutic Activity Minutes 10  -LF      36471 - OT Self Care/Mgmt Minutes 14  -LF         Total Minutes    Timed Charges Total Minutes 24  -LF       Total Minutes 24  -LF                User Key  (r) = Recorded By, (t) = Taken By, (c) = Cosigned By      Initials Name Provider Type    LF Marla Fox, OT Occupational Therapist                  Therapy Charges for Today       Code Description Service Date Service Provider Modifiers Qty    38445122458 HC OT SELF CARE/MGMT/TRAIN EA 15 MIN 9/21/2023 Marla Fox OT GO 1    98330492387 HC OT THERAPEUTIC ACT EA 15 MIN 9/22/2023 Marla Fox OT GO 1    97099156044 HC OT SELF CARE/MGMT/TRAIN EA 15 MIN 9/22/2023 Marla Fox OT GO 1                 Marla Fox OT  9/22/2023

## 2023-09-22 NOTE — PROGRESS NOTES
[facility]   Hospitalist Progress Note  Date: 2023  Patient Name: Selvin Ferguson  : 1935  MRN: [id number]  Date of admission: 2023      Subjective   Subjective     Chief complaint: Anemia    Summary:  87-year-old male hospitalized on 2023 with hemoglobin of 5.7 with significant anemia and elevated BNP and elevation of his INR, he had pulmonary edema and congestion seen on imaging, he was placed on diuretics in between of receiving blood products, he required 2 units to stabilize hemoglobins, hematology, nephrology and GI consulted.  Concern for myelodysplastic syndrome versus anemia of chronic kidney disease    Interval follow-up: Seen and examined this morning, no acute distress, no acute major night events, appearing better, no chest pain or palpitations, refused to drink bowel prep, does not want colonoscopy.  No chest pain or palpitations, hemoglobin is at 7.9, dropped from 8.7 previous day, no reported bloody bowel movements.  BUN 28, creatinine 1.71.  Telemetry reviewed, no acute major rhythmic events.    Review of systems:  All systems reviewed and negative except for generalized fatigue, generalized weakness    Objective   Objective     Vitals:   Temp:  [98.2 °F (36.8 °C)-98.5 °F (36.9 °C)] 98.4 °F (36.9 °C)  Heart Rate:  [77-84] 77  Resp:  [18-20] 18  BP: (103-136)/(46-81) 132/46  Physical Exam                Constitutional: Awake, alert lying in bed              Eyes: PERRLA, sclerae anicteric, no conjunctival injection, pallor              HENT: NCAT, mucous membranes moist              Neck: Supple, full range of motion              Respiratory: Clear to auscultation bilaterally, nonlabored respirations               Cardiovascular: RRR, no murmurs, rubs, or gallops, palpable pedal pulses bilaterally              Gastrointestinal: Positive bowel sounds, soft, nontender, nondistended              Musculoskeletal: No bilateral ankle edema, no clubbing or cyanosis to  extremities              Psychiatric: Appropriate affect, cooperative              Neurologic: Oriented to self, strength symmetric in all extremities, Cranial Nerves grossly intact to confrontation, speech clear              Skin: No rashes       Result Review    Result Review:  I have personally reviewed the pertinent results from the past 24 hours to 9/22/2023 14:53 EDT and agree with these findings:  [x]  Laboratory   CBC          9/20/2023    06:16 9/20/2023    11:43 9/20/2023    18:18 9/20/2023    23:50 9/21/2023    04:49 9/22/2023    04:52   CBC   WBC 6.20     5.05  5.49    RBC 3.00     2.81  2.52    Hemoglobin 9.4  9.0  8.9  8.9  8.7  7.9    Hematocrit 30.4  27.4  27.3  27.0  28.8  24.0    .3     102.5  95.2    MCH 31.3     31.0  31.3    MCHC 30.9     30.2  32.9    RDW 21.4     20.5  19.7    Platelets 96     63  56      BMP          9/20/2023    06:16 9/21/2023    04:49 9/22/2023    04:52   BMP   BUN 42  33  28    Creatinine 1.94  1.74  1.71    Sodium 137  138  137    Potassium 3.7  3.8  3.6    Chloride 105  107  106    CO2 18.1  19.7  21.7    Calcium 8.9  8.8  8.6    LIVER FUNCTION TESTS:      Lab 09/22/23  0452 09/21/23  0449 09/20/23  0616 09/19/23  1332 09/18/23  2107   TOTAL PROTEIN 6.5 6.8  --   --  7.2   ALBUMIN 3.7 4.0 4.1 3.9 4.4   GLOBULIN  --   --   --   --  2.8   ALT (SGPT) 8 10  --   --  13   AST (SGOT) 9 11  --   --  14   BILIRUBIN 1.4* 1.8*  --   --  0.9   INDIRECT BILIRUBIN 1.1 1.4  --   --   --    BILIRUBIN DIRECT 0.3 0.4*  --   --   --    ALK PHOS 76 84  --   --  88         [x]  Microbiology No results found for: ACANTHNAEG, AFBCX, BPERTUSSISCX, BLOODCX  No results found for: BCIDPCR, CXREFLEX, CSFCX, CULTURETIS  No results found for: CULTURES, HSVCX, URCX  No results found for: EYECULTURE, GCCX, HSVCULTURE, LABHSV  No results found for: LEGIONELLA, MRSACX, MUMPSCX, MYCOPLASCX  No results found for: NOCARDIACX, STOOLCX  No results found for: THROATCX, UNSTIMCULT, URINECX, CULTURE,  VZVCULTUR  No results found for: VIRALCULTU, WOUNDCX    [x]  Radiology XR Chest 1 View    Result Date: 9/18/2023  PROCEDURE: XR CHEST 1 VW  COMPARISON: 5/13/2022.  INDICATIONS: SOA/SOB/shortness of air/shortness of breath; persistent cough.  FINDINGS: A single AP upright portable chest radiograph is provided for review.  There is possible new airspace opacification in the left lung base, which may represent pneumonia.  Aspiration pneumonia is possible.  Probably no acute infiltrate on the right.  Low lung volumes are present.  There may be borderline cardiac enlargement.  The patient has undergone median sternotomy and CABG surgery.  Fractured sternotomy wires are noted, as before.  Minimal, if any, pleural effusion is seen.  Thoracic aortic atherosclerotic change is seen.  No pneumothorax is identified.  There is generalized osteopenia.  There may be old healed right-sided rib fractures.       There is possible new left basilar pneumonia.  Consider close interval clinical and if necessary imaging follow-up to ensure a benign progression.      Please note that portions of this note were completed with a voice recognition program.  MARISSA WHITE JR, MD       Electronically Signed and Approved By: MARISSA WHITE JR, MD on 9/18/2023 at 22:44                [x]  EKG/Telemetry   []  Cardiology/Vascular   []  Pathology  [x]  Old records  []  Other:    Assessment & Plan   Assessment / Plan     Assessment/Plan:    Assessment:  Symptomatic anemia  Concern for acute blood loss anemia  Possible myelodysplastic syndrome versus anemia of chronic kidney disease  CKD stage IIIb/IV  Neurogenic bladder  Diabetes mellitus  Metabolic acidosis  Diabetes mellitus  BPH with LUTS  Heart failure with preserved ejection fraction  Essential hypertension  COPD  Dementia advanced  CAD    Plan:  Labs and imaging reviewed  Patient not wanting to do prep for colonoscopy at this time  Continue hemoglobin monitoring over the next 24 hours  If  considered stable will discuss disposition home tomorrow with home health  We will follow-up with GI, nephrology and hematology as outpatient  Continue tamsulosin 0.4 mg daily  Continue holding Lasix with euvolemic state  Continue metoprolol 25 mg daily  Continue insulin sliding scale coverage  ontinue sodium bicarb tabs  Nephrology consulted discussed with Dr. Kent, recommendations appreciated  Gastroenterology consulted discussed Dr. Tong, recommendations appreciated  Hematology consulted discussed with , recommendations appreciated  Strict I's and O's  Daily weights  If hemoglobin drops less than 8, transfuse 1 unit PRBC  Follow-up neuroimaging ordered by hematology  A.m. labs  Full code  DVT prophylaxis with SCDs  Clinical course to dictate further management  Discussed with nurse at the bedside    DVT prophylaxis:  Mechanical DVT prophylaxis orders are present.    CODE STATUS:   Code Status (Patient has no pulse and is not breathing): CPR (Attempt to Resuscitate)  Medical Interventions (Patient has pulse or is breathing): Full Support    Electronically signed by Zoila Jung MD, 09/22/23, 2:56 PM EDT.    Portions of this documentation were transcribed electronically from a voice recognition software.  I confirm all data accurately represents the service(s) I performed at today's visit.

## 2023-09-22 NOTE — PLAN OF CARE
Goal Outcome Evaluation:  Plan of Care Reviewed With: patient        Progress: no change  Outcome Evaluation: VSS. No complaint of pain. Pt only alert to self and place. Pt refused to drink GoLytely tonight. Education provided but pt continued to refuse. Continued to encourage drinking throughout night but pt refused. No bowel movement tonight. BGL monitored. Insulin given per sliding scale.

## 2023-09-22 NOTE — NURSING NOTE
"Pt had order to start Golytely tonight. Pt initially complained of nasty taste of drink. Nurse went to alexisb lemonade crystal lite to help with taste. Encouraged patient to drink since beginning of shift. Education provided on importance of drinking all of it by morning multiple times. Pt states he understand but he does not want to drink it anymore. Pt state, \"you must be new here if you think I will drink all of it by morning.\" Pt only drank one 12 ounce cup of Golytely. Pt kicked nurse out and told her to leave at 0100 when she asked pt to drink more of Golytely. Will continue to encourage patient to drink.   "

## 2023-09-22 NOTE — PROGRESS NOTES
Psychiatric     Progress Note    Patient Name: Selvin Ferguson  : 1935  MRN: [id number]  Primary Care Physician:  Kayla Renee APRN  Date of admission: 2023    Subjective   Subjective     Chief Complaint: Trying to get the copies of records from [facility] he apparently has had some work-up done for his anemia and has been started on growth factors patient still has not taken his GoLytely and a colonoscopy has not been done as yet    HPI: Patient trying to take GoLytely but is refusing at this time    Review of Systems   All systems were reviewed and negative except for: Reviewed    Objective   Objective     Vitals:   Temp:  [98.2 °F (36.8 °C)-98.5 °F (36.9 °C)] 98.4 °F (36.9 °C)  Heart Rate:  [77-84] 77  Resp:  [16-20] 18  BP: (103-136)/(46-81) 132/46    Physical Exam    Constitutional: Awake, alert   Eyes: PERRLA, sclerae anicteric, no conjunctival injection   HENT: NCAT, mucous membranes moist   Neck: Supple, no thyromegaly, no lymphadenopathy, trachea midline   Respiratory: Clear to auscultation bilaterally, nonlabored respirations    Cardiovascular: RRR, no murmurs, rubs, or gallops, palpable pedal pulses bilaterally   Gastrointestinal: Positive bowel sounds, soft, nontender, nondistended   Musculoskeletal: No bilateral ankle edema, no clubbing or cyanosis to extremities   Psychiatric: Appropriate affect, cooperative   Neurologic: Oriented x 3, strength symmetric in all extremities, Cranial Nerves grossly intact to confrontation, speech clear   Skin: No rashes   No change  Result Review    Result Review:  I have personally reviewed the results from the time of this admission to 2023 11:01 EDT and agree with these findings:  [x]  Laboratory anemia and elevated BUN/creatinine  []  Microbiology  []  Radiology  []  EKG/Telemetry   []  Cardiology/Vascular   []  Pathology  []  Old records  []  Other:  Most notable findings include: Anemia with elevated BUN and creatinine    Assessment &  Plan   Assessment / Plan     Brief Patient Summary:  Selvin Ferguson is a 87 y.o. male who patient with anemia possible blood loss stable myelodysplastic syndrome    Active Hospital Problems:  Active Hospital Problems    Diagnosis    • **Symptomatic anemia    • Essential hypertension    • CAD (coronary artery disease)    • History of dementia    • Chronic kidney disease, stage IV (severe)    • Type 2 diabetes mellitus    • Urinary retention    • Acute renal failure superimposed on chronic kidney disease        Plan:   Continue the current management waiting for colonoscopy    DVT prophylaxis:  Mechanical DVT prophylaxis orders are present.    CODE STATUS:   Code Status (Patient has no pulse and is not breathing): CPR (Attempt to Resuscitate)  Medical Interventions (Patient has pulse or is breathing): Full Support    Disposition:  I expect patient to be discharged after the patient has been stabilized.    Electronically signed by Tristin Reece MD, 09/22/23, 11:01 AM EDT.      Part of this note may be an electronic transcription/translation of spoken language to printed text using the Dragon Dictation System.

## 2023-09-23 ENCOUNTER — READMISSION MANAGEMENT (OUTPATIENT)
Dept: CALL CENTER | Facility: HOSPITAL | Age: 88
End: 2023-09-23
Payer: MEDICARE

## 2023-09-23 VITALS
WEIGHT: 203.93 LBS | TEMPERATURE: 98.7 F | HEIGHT: 71 IN | OXYGEN SATURATION: 100 % | SYSTOLIC BLOOD PRESSURE: 153 MMHG | HEART RATE: 91 BPM | DIASTOLIC BLOOD PRESSURE: 66 MMHG | BODY MASS INDEX: 28.55 KG/M2 | RESPIRATION RATE: 18 BRPM

## 2023-09-23 PROBLEM — K21.9 GASTROESOPHAGEAL REFLUX DISEASE: Status: ACTIVE | Noted: 2023-09-23

## 2023-09-23 PROBLEM — I11.0 HYPERTENSIVE HEART DISEASE WITH HEART FAILURE: Status: ACTIVE | Noted: 2023-09-23

## 2023-09-23 PROBLEM — N40.0 BPH (BENIGN PROSTATIC HYPERPLASIA): Status: ACTIVE | Noted: 2023-09-23

## 2023-09-23 PROBLEM — E11.9 ABNORMAL METABOLIC STATE DUE TO DIABETES MELLITUS: Status: ACTIVE | Noted: 2023-09-23

## 2023-09-23 PROBLEM — G62.9 NEUROPATHY: Status: ACTIVE | Noted: 2023-09-23

## 2023-09-23 PROBLEM — F03.C0 ADVANCED DEMENTIA: Status: ACTIVE | Noted: 2023-09-23

## 2023-09-23 PROBLEM — J44.9 CHRONIC OBSTRUCTIVE PULMONARY DISEASE, UNSPECIFIED: Status: ACTIVE | Noted: 2023-09-23

## 2023-09-23 LAB
ALBUMIN SERPL-MCNC: 3.9 G/DL (ref 3.5–5.2)
ALP SERPL-CCNC: 79 U/L (ref 39–117)
ALT SERPL W P-5'-P-CCNC: 8 U/L (ref 1–41)
ANION GAP SERPL CALCULATED.3IONS-SCNC: 9.4 MMOL/L (ref 5–15)
AST SERPL-CCNC: 9 U/L (ref 1–40)
BASOPHILS # BLD AUTO: 0.11 10*3/MM3 (ref 0–0.2)
BASOPHILS NFR BLD AUTO: 2 % (ref 0–1.5)
BILIRUB CONJ SERPL-MCNC: 0.3 MG/DL (ref 0–0.3)
BILIRUB INDIRECT SERPL-MCNC: 1 MG/DL
BILIRUB SERPL-MCNC: 1.3 MG/DL (ref 0–1.2)
BUN SERPL-MCNC: 25 MG/DL (ref 8–23)
BUN/CREAT SERPL: 15.7 (ref 7–25)
CALCIUM SPEC-SCNC: 8.7 MG/DL (ref 8.6–10.5)
CHLORIDE SERPL-SCNC: 105 MMOL/L (ref 98–107)
CO2 SERPL-SCNC: 22.6 MMOL/L (ref 22–29)
CREAT SERPL-MCNC: 1.59 MG/DL (ref 0.76–1.27)
DEPRECATED RDW RBC AUTO: 67.7 FL (ref 37–54)
EGFRCR SERPLBLD CKD-EPI 2021: 41.8 ML/MIN/1.73
EOSINOPHIL # BLD AUTO: 0.27 10*3/MM3 (ref 0–0.4)
EOSINOPHIL NFR BLD AUTO: 5 % (ref 0.3–6.2)
ERYTHROCYTE [DISTWIDTH] IN BLOOD BY AUTOMATED COUNT: 19.4 % (ref 12.3–15.4)
GLUCOSE BLDC GLUCOMTR-MCNC: 161 MG/DL (ref 70–99)
GLUCOSE BLDC GLUCOMTR-MCNC: 192 MG/DL (ref 70–99)
GLUCOSE SERPL-MCNC: 191 MG/DL (ref 65–99)
HCT VFR BLD AUTO: 23.4 % (ref 37.5–51)
HGB BLD-MCNC: 7.7 G/DL (ref 13–17.7)
IMM GRANULOCYTES # BLD AUTO: 0.04 10*3/MM3 (ref 0–0.05)
IMM GRANULOCYTES NFR BLD AUTO: 0.7 % (ref 0–0.5)
LYMPHOCYTES # BLD AUTO: 1.65 10*3/MM3 (ref 0.7–3.1)
LYMPHOCYTES NFR BLD AUTO: 30.5 % (ref 19.6–45.3)
MCH RBC QN AUTO: 31.4 PG (ref 26.6–33)
MCHC RBC AUTO-ENTMCNC: 32.9 G/DL (ref 31.5–35.7)
MCV RBC AUTO: 95.5 FL (ref 79–97)
MONOCYTES # BLD AUTO: 0.26 10*3/MM3 (ref 0.1–0.9)
MONOCYTES NFR BLD AUTO: 4.8 % (ref 5–12)
NEUTROPHILS NFR BLD AUTO: 3.08 10*3/MM3 (ref 1.7–7)
NEUTROPHILS NFR BLD AUTO: 57 % (ref 42.7–76)
NRBC BLD AUTO-RTO: 0 /100 WBC (ref 0–0.2)
PHOSPHATE SERPL-MCNC: 2.6 MG/DL (ref 2.5–4.5)
PLATELET # BLD AUTO: 59 10*3/MM3 (ref 140–450)
PMV BLD AUTO: 12.4 FL (ref 6–12)
POTASSIUM SERPL-SCNC: 4.2 MMOL/L (ref 3.5–5.2)
PROT SERPL-MCNC: 6.7 G/DL (ref 6–8.5)
RBC # BLD AUTO: 2.45 10*6/MM3 (ref 4.14–5.8)
RETICS # AUTO: 0.03 10*6/MM3 (ref 0.02–0.13)
RETICS/RBC NFR AUTO: 1.24 % (ref 0.7–1.9)
SODIUM SERPL-SCNC: 137 MMOL/L (ref 136–145)
WBC NRBC COR # BLD: 5.41 10*3/MM3 (ref 3.4–10.8)

## 2023-09-23 PROCEDURE — 80076 HEPATIC FUNCTION PANEL: CPT | Performed by: FAMILY MEDICINE

## 2023-09-23 PROCEDURE — 99239 HOSP IP/OBS DSCHRG MGMT >30: CPT | Performed by: FAMILY MEDICINE

## 2023-09-23 PROCEDURE — 82948 REAGENT STRIP/BLOOD GLUCOSE: CPT

## 2023-09-23 PROCEDURE — 84100 ASSAY OF PHOSPHORUS: CPT | Performed by: INTERNAL MEDICINE

## 2023-09-23 PROCEDURE — 80048 BASIC METABOLIC PNL TOTAL CA: CPT | Performed by: INTERNAL MEDICINE

## 2023-09-23 PROCEDURE — 63710000001 INSULIN LISPRO (HUMAN) PER 5 UNITS: Performed by: INTERNAL MEDICINE

## 2023-09-23 PROCEDURE — 85045 AUTOMATED RETICULOCYTE COUNT: CPT | Performed by: INTERNAL MEDICINE

## 2023-09-23 PROCEDURE — 85025 COMPLETE CBC W/AUTO DIFF WBC: CPT | Performed by: FAMILY MEDICINE

## 2023-09-23 RX ORDER — PANTOPRAZOLE SODIUM 40 MG/1
40 TABLET, DELAYED RELEASE ORAL 2 TIMES DAILY
Qty: 60 TABLET | Refills: 0 | Status: SHIPPED | OUTPATIENT
Start: 2023-09-23 | End: 2023-10-23

## 2023-09-23 RX ORDER — SODIUM BICARBONATE 650 MG/1
1300 TABLET ORAL 2 TIMES DAILY
Qty: 120 TABLET | Refills: 0 | Status: SHIPPED | OUTPATIENT
Start: 2023-09-23 | End: 2023-10-23

## 2023-09-23 RX ORDER — METOPROLOL SUCCINATE 25 MG/1
25 TABLET, EXTENDED RELEASE ORAL DAILY
Qty: 30 TABLET | Refills: 0 | Status: SHIPPED | OUTPATIENT
Start: 2023-09-24 | End: 2023-10-24

## 2023-09-23 RX ORDER — TAMSULOSIN HYDROCHLORIDE 0.4 MG/1
0.4 CAPSULE ORAL DAILY
Qty: 30 CAPSULE | Refills: 0 | Status: SHIPPED | OUTPATIENT
Start: 2023-09-24 | End: 2023-10-24

## 2023-09-23 RX ORDER — CLOPIDOGREL BISULFATE 75 MG/1
75 TABLET ORAL EVERY 24 HOURS
Qty: 30 TABLET
Start: 2023-09-26

## 2023-09-23 RX ADMIN — TAMSULOSIN HYDROCHLORIDE 0.4 MG: 0.4 CAPSULE ORAL at 08:13

## 2023-09-23 RX ADMIN — INSULIN LISPRO 2 UNITS: 100 INJECTION, SOLUTION INTRAVENOUS; SUBCUTANEOUS at 08:13

## 2023-09-23 RX ADMIN — METOPROLOL SUCCINATE 25 MG: 25 TABLET, EXTENDED RELEASE ORAL at 08:14

## 2023-09-23 RX ADMIN — SODIUM BICARBONATE 650 MG TABLET 1300 MG: at 08:13

## 2023-09-23 RX ADMIN — FAMOTIDINE 20 MG: 20 TABLET ORAL at 08:14

## 2023-09-23 RX ADMIN — POTASSIUM PHOSPHATE, MONOBASIC 1000 MG: 500 TABLET, SOLUBLE ORAL at 08:13

## 2023-09-23 RX ADMIN — SENNOSIDES AND DOCUSATE SODIUM 2 TABLET: 50; 8.6 TABLET ORAL at 08:13

## 2023-09-23 NOTE — PROGRESS NOTES
[facility]     Progress Note    Patient Name: Selvin Ferguson  : 1935  MRN: [id number]  Primary Care Physician:  Kayla Renee APRN  Date of admission: 2023    Subjective   Subjective     Chief Complaint: Patient has anemia could never have colonoscopy he refuses it and he is refused to take any GoLytely patient apparently is being treated at [facility] for possibly myelodysplastic syndrome I have not been able to get any records from [facility] but [facility]    HPI: With anemia of chronic disease with renal failure and possible myelodysplastic syndrome cannot rule out possibility of acute blood loss    Review of Systems   All systems were reviewed and negative except for: Has been reviewed    Objective   Objective     Vitals:   Temp:  [98.1 °F (36.7 °C)-98.5 °F (36.9 °C)] 98.3 °F (36.8 °C)  Heart Rate:  [77-90] 84  Resp:  [18] 18  BP: (111-146)/(46-72) 138/51    Physical Exam    Constitutional: Awake, alert   Eyes: PERRLA, sclerae anicteric, no conjunctival injection   HENT: NCAT, mucous membranes moist   Neck: Supple, no thyromegaly, no lymphadenopathy, trachea midline   Respiratory: Clear to auscultation bilaterally, nonlabored respirations    Cardiovascular: RRR, no murmurs, rubs, or gallops, palpable pedal pulses bilaterally   Gastrointestinal: Positive bowel sounds, soft, nontender, nondistended   Musculoskeletal: No bilateral ankle edema, no clubbing or cyanosis to extremities   Psychiatric: Appropriate affect, cooperative   Neurologic: Oriented x 3, strength symmetric in all extremities, Cranial Nerves grossly intact to confrontation, speech clear   Skin: No rashes   In forgetful has history of dementia  Result Review    Result Review:  I have personally reviewed the results from the time of this admission to 2023 10:23 EDT and agree with these findings:  [x]  Laboratory iron deficiency anemia  []   Microbiology  []  Radiology  []  EKG/Telemetry   []  Cardiology/Vascular   []  Pathology  []  Old records  []  Other:  Most notable findings include: Iron deficiency anemia    Assessment & Plan   Assessment / Plan     Brief Patient Summary:  Selvin Ferguson is a 87 y.o. male who patient with anemia of chronic disease iron deficiency and possible myelodysplastic syndrome    Active Hospital Problems:  Active Hospital Problems    Diagnosis     **Symptomatic anemia     Essential hypertension     CAD (coronary artery disease)     History of dementia     Chronic kidney disease, stage IV (severe)     Type 2 diabetes mellitus     Urinary retention     Acute renal failure superimposed on chronic kidney disease        Plan:   Patient to be discharged will be followed at [facility]    DVT prophylaxis:  Mechanical DVT prophylaxis orders are present.    CODE STATUS:   Code Status (Patient has no pulse and is not breathing): CPR (Attempt to Resuscitate)  Medical Interventions (Patient has pulse or is breathing): Full Support    Disposition:  I expect patient to be discharged after the patient has been stabilized.    Electronically signed by Tristin Reece MD, 09/23/23, 10:23 AM EDT.      Part of this note may be an electronic transcription/translation of spoken language to printed text using the Dragon Dictation System.

## 2023-09-23 NOTE — PROGRESS NOTES
[facility]     Progress Note    Patient Name: Selvin Ferguson  : 1935  MRN: [id number]  Primary Care Physician:  Kayla Renee APRN  Date of admission: 2023    Subjective patient is doing well and has no new concerns or issues  He is not drinking GoLytely so he is not clean for colonoscopy      Review of Systems  Constitutional:        Weakness tiredness fatigue  Eyes:                       No blurry vision, eye discharge, eye irritation, eye pain  HEENT:                   No acute hair loss, earache and discharge, nasal congestion or discharge, sore throat, postnasal drip  Respiratory:           No shortness of breath coughing sputum production wheezing hemoptysis pleuritic chest pain  Cardiovascular:     No chest pain, orthopnea, PND, dizziness, palpitation, lower extremity edema  Gastrointestinal:   No nausea vomiting diarrhea abdominal pain constipation  Genitourinary:       No urinary incontinence, hesitancy, frequency, urgency, dysuria  Hematologic:         No bruising, bleeding, pallor, lymphadenopathy  Endocrine:            No coldness, hot flashes, polyuria, abnormal hair growth  Musculoskeletal:    No body pains, aches, arthritic pains, muscle pain ,muscle wasting  Psychiatric:          No low or high mood, anxiety, hallucinations, delusions  Skin.                      No rash, ulcers, bruising, itching  Neurological:        No confusion, headache, focal weakness, numbness, dysphasia    Objective   Objective     Vitals:   Temp:  [98.1 °F (36.7 °C)-98.5 °F (36.9 °C)] 98.3 °F (36.8 °C)  Heart Rate:  [77-90] 84  Resp:  [18] 18  BP: (111-146)/(46-72) 138/51  Physical Exam    Constitutional: Awake, alert responsive, conversant, no obvious distress              Psychiatric:  Appropriate affect, cooperative   Neurologic:  Awake alert ,oriented x 3, strength symmetric in all extremities, Cranial Nerves grossly intact to confrontation, speech clear   Eyes:   PERRLA, sclerae anicteric, no  conjunctival injection   HEENT:  Moist mucous membranes, no nasal or eye discharge, no throat congestion   Neck:   Supple, no thyromegaly, no lymphadenopathy, trachea midline, no elevated JVD   Respiratory:  Clear to auscultation bilaterally, nonlabored respirations    Cardiovascular: RRR, no murmurs, rubs, or gallops, palpable pedal pulses bilaterally, No bilateral ankle edema   Gastrointestinal: Positive bowel sounds, soft, nontender, nondistended, no organomegaly   Musculoskeletal:  No clubbing or cyanosis to extremities,muscle wasting, joint swelling, muscle weakness             Skin:                      No rashes, bruising, skin ulcers, petechiae or ecchymosis    Result Review    Result Review:  I have personally reviewed the results from the time of this admission to 9/23/2023 08:28 EDT and agree with these findings:  []  Laboratory  []  Microbiology  []  Radiology  []  EKG/Telemetry   []  Cardiology/Vascular   []  Pathology  []  Old records  []  Other:    Assessment & Plan   Assessment / Plan       Active Hospital Problems:    Active Hospital Problems    Diagnosis  POA    **Symptomatic anemia [D64.9]  Yes    Essential hypertension [I10]  Unknown    CAD (coronary artery disease) [I25.10]  Unknown    History of dementia [Z86.59]  Not Applicable    Chronic kidney disease, stage IV (severe) [N18.4]  Unknown      Baseline creatinine 2.0-2.4      Type 2 diabetes mellitus [E11.9]  Yes    Urinary retention [R33.9]  Yes    Acute renal failure superimposed on chronic kidney disease [N17.9, N18.9]  Yes     Patient's baseline creatinine is around 2 and he follows with Dr. 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  It is actually better than patient's usual baseline of approximately 2  He has good urine  output  Blood pressure is well controlled  Patient will need nephrology outpatient follow-up in about 1 week after discharge with repeat labs.      Renal will sign off at this point.  Please feel free to call with questions or concerns.

## 2023-09-23 NOTE — OUTREACH NOTE
Prep Survey      Flowsheet Row Responses   [facility]od[facility] facility patient discharged from? [facility]   Is LACE score < 7 ? No   Eligibility Readm Mgmt   Discharge diagnosis Symptomatic anemia   Does the patient have one of the following disease processes/diagnoses(primary or secondary)? Other   Does the patient have Home health ordered? Yes   [facility]   Is there a DME ordered? No   Prep survey completed? Yes            Lili MCKEON - Registered Nurse

## 2023-09-23 NOTE — PLAN OF CARE
Goal Outcome Evaluation: Pt rested well overnight. Pt's VSS. Pt has had no reports of pain or nausea so far. Pt tolerating ambulating using a walker with X1 assistance. Pt alert to self and able to make needs known, but is slightly disoriented to situation. Call light in reach, bed alert active and audible, and bed in lowest locked position. Pt has had no additional changes or complaints so far. Mey Reyes RN

## 2023-09-23 NOTE — DISCHARGE SUMMARY
[facility]         HOSPITALIST  DISCHARGE SUMMARY    Patient Name: Selvin Ferguson  : 1935  MRN: [id number]    Date of Admission: 2023  Date of Discharge:  2023    Primary Care Physician: Kayla Renee APRN    Consults       Date and Time Order Name Status Description    2023  9:39 AM Hematology & Oncology Inpatient Consult      2023  9:39 AM Inpatient Nephrology Consult      2023  9:28 AM Inpatient Gastroenterology Consult Completed     2023 10:58 PM Inpatient Hospitalist Consult              Active and Resolved Hospital Problems:  Active Hospital Problems    Diagnosis POA    **Symptomatic anemia [D64.9] Yes    Essential hypertension [I10] Unknown    CAD (coronary artery disease) [I25.10] Unknown    History of dementia [Z86.59] Not Applicable    Chronic kidney disease, stage IV (severe) [N18.4] Unknown    Type 2 diabetes mellitus [E11.9] Yes    Urinary retention [R33.9] Yes    Acute renal failure superimposed on chronic kidney disease [N17.9, N18.9] Yes      Resolved Hospital Problems    Diagnosis POA    CKD (chronic kidney disease) stage 1, GFR 90 ml/min or greater [N18.1] Unknown   Symptomatic anemia  Concern for acute blood loss anemia  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 Concern for myelodysplastic syndrome versus anemia  of chronic kidney disease, hemoglobin stabilized, anticipated plan for colonoscopy but patient refused to do bowel prep.  He underwent upper scope which was diagnostic for medium size hiatal hernia, biopsies taken.  The patient requested disposition home on 9/23/2023, referral sent so he can follow-up with oncology as well as nephrology.  [facility]  Discharged in hemodynamically stable edition 9/23/2023, to resume Plavix in 5 days.      Day of Discharge     Vital Signs:  Temp:  [98.1 °F (36.7 °C)-98.7 °F (37.1 °C)] 98.7 °F (37.1 °C)  Heart Rate:  [77-91] 91  Resp:  [18] 18  BP: (111-153)/(46-72) 153/66    Review of systems:  All systems reviewed and negative except for generalized fatigue, generalized weakness    Physical Exam                          Constitutional: Awake, alert lying in bed              Eyes: PERRLA, sclerae anicteric, no conjunctival injection, pallor              HENT: NCAT, mucous membranes moist              Neck: Supple, full range of motion              Respiratory: Clear to auscultation bilaterally, nonlabored respirations               Cardiovascular: RRR, no murmurs, rubs, or gallops, palpable pedal pulses bilaterally              Gastrointestinal: Positive bowel sounds, soft, nontender, nondistended              Musculoskeletal: No bilateral ankle edema, no clubbing or cyanosis to extremities              Psychiatric: Appropriate affect, cooperative              Neurologic: Oriented to self, strength symmetric in all extremities, Cranial Nerves grossly intact to confrontation, speech clear              Skin: No rashes         Discharge Details        Discharge Medications        New Medications        Instructions Start Date   sodium bicarbonate 650 MG tablet   1,300 mg, Oral, 2 Times Daily      tamsulosin 0.4 MG capsule 24 hr capsule  Commonly known as: FLOMAX   0.4 mg, Oral, Daily   Start Date: September 24, 2023            Changes  to Medications        Instructions Start Date   clopidogrel 75 MG tablet  Commonly known as: PLAVIX  What changed: These instructions start on September 26, 2023. If you are unsure what to do until then, ask your doctor or other care provider.   75 mg, Oral, Every 24 Hours   Start Date: September 26, 2023     metoprolol succinate XL 25 MG 24 hr tablet  Commonly known as: TOPROL-XL  What changed:   medication strength  Another medication with the same name was removed. Continue taking this medication, and follow the directions you see here.   25 mg, Oral, Daily   Start Date: September 24, 2023     pantoprazole 40 MG EC tablet  Commonly known as: PROTONIX  What changed: when to take this   40 mg, Oral, 2 Times Daily             Continue These Medications        Instructions Start Date   amLODIPine 10 MG tablet  Commonly known as: NORVASC   10 mg, Oral, Daily      aspirin 81 MG EC tablet   81 mg, Oral, Every 24 Hours      Darbepoetin New 40 MCG/0.4ML solution prefilled syringe   40 mcg, Subcutaneous, Every 14 Days      fexofenadine 60 MG tablet  Commonly known as: ALLEGRA   60 mg, Oral, Daily PRN      fluticasone 50 MCG/ACT nasal spray  Commonly known as: FLONASE   2 sprays, Each Nare, Daily PRN      hydrophilic ointment   1 application , Topical, 2 Times Daily PRN      lidocaine 5 %  Commonly known as: LIDODERM   1 patch, Transdermal, Daily PRN      lisinopril 10 MG tablet  Commonly known as: PRINIVIL,ZESTRIL   5 mg, Oral, Daily      nitroglycerin 0.4 MG SL tablet  Commonly known as: NITROSTAT   0.4 mg, Oral, Every 5 Minutes PRN      rosuvastatin 40 MG tablet  Commonly known as: CRESTOR   40 mg, Oral, Daily      traZODone 50 MG tablet  Commonly known as: DESYREL   25 mg, Oral, Nightly               No Known Allergies    Discharge Disposition:  Home-Health Care [facility]    Diet:  Hospital:  Diet Order   Procedures    Diet: Regular/House Diet; Texture: Regular Texture (IDDSI 7); Fluid Consistency: Thin (IDDSI 0)        Discharge Activity:   Activity Instructions    Call your doctor or go to the nearest emergency room if you experience new fatigue, lightheadedness, dizziness, or weakness.             CODE STATUS:  Code Status and Medical Interventions:   Ordered at: 09/19/23 0919     Code Status (Patient has no pulse and is not breathing):    CPR (Attempt to Resuscitate)     Medical Interventions (Patient has pulse or is breathing):    Full Support         No future appointments.    Additional Instructions for the Follow-ups that You Need to Schedule       Ambulatory Referral to Hematology / Oncology   As directed      Discharge Follow-up with PCP   As directed       Currently Documented PCP:    Kayla Renee APRN    PCP Phone Number:    [phone]     Follow Up Details: 3 to 7 days                Pertinent  and/or Most Recent Results     PROCEDURES:   Adult Transthoracic Echo Complete W/ Cont if Necessary Per Protocol    Result Date: 9/21/2023    Left ventricular ejection fraction appears to be 56 - 60%.   Left ventricular wall thickness is consistent with mild concentric hypertrophy.   The left atrial cavity is mildly dilated.   Moderate tricuspid valve regurgitation is present.   Estimated right ventricular systolic pressure from tricuspid regurgitation is mildly elevated (35-45 mmHg). There were no apparent intracardiac masses, vegetations or thrombi.     CT Abdomen Pelvis Without Contrast    Result Date: 9/21/2023  PROCEDURE: CT ABDOMEN PELVIS WO CONTRAST  COMPARISON: [facility], CT, ABDOMEN-PELVIS W, 12/24/2006, 16:50.  No recent prior studies available for comparison.  INDICATIONS: Hematologic malignancy, assess treatment response  TECHNIQUE: CT images were created without intravenous contrast.   PROTOCOL:   Standard imaging protocol performed    RADIATION:   DLP: 797.1 mGy*cm   Automated exposure control was utilized to minimize radiation dose.  FINDINGS:  Coronary and other vascular calcification is  partially included.  There is a small hiatal hernia.  There is circumferential thickening of the distal esophagus and proximal stomach which is nonspecific.  There are small bilateral pleural effusions.  There is interlobular septal thickening in the lung bases bilaterally suggesting chronic interstitial lung disease.  Cylindrical bronchiectasis is noted bilaterally in the lower lobes.  Unenhanced evaluation of the spleen, pancreas, adrenals is grossly unremarkable.  The liver has a grossly normal appearance.  Cholelithiasis is noted.  The left kidney is atrophic there are bilateral hypodense renal lesions, some which contain coarse calcifications.  These are incompletely characterized without contrast.  Urinary bladder is distended.  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  The prosthesis itself appears to been removed.  There is a left inguinal hernia containing fat and a portion of descending colon without evidence of bowel obstruction.  There is no evidence of bowel obstruction, free air or free fluid.  The appendix not definitely identified.  There is widespread colonic diverticulosis without findings to suggest diverticulitis.  Large stool ball present in the rectum suggesting constipation or fecal impaction.  No gross adenopathy is identified within the abdomen or pelvis.  Atherosclerotic vascular calcification is noted.  There is degenerative change in the spine.  There is bony ankylosis of the SI joints.  Stable small sclerotic lesion in the posterior right ilium as compared to prior study of 2006. No aggressive osseous lesions are identified.  Small fat containing paraumbilical hernia.         1. No adenopathy within the abdomen or pelvis.  2. Small hiatal hernia with nonspecific circumferential thickening of the distal esophagus and proximal stomach.  Neoplasm not excluded.  3. Small bilateral pleural effusions.  Bibasilar cylindrical bronchiectasis and probable chronic  interstitial changes.  4. Indeterminate bilateral renal lesions.  These could be more definitively characterized with a contrasted study.  5. Cholelithiasis.  6. Left inguinal hernia containing fat and portion of descending colon without evidence of bowel obstruction.  6. Additional findings as given above.      LOW YOO MD       Electronically Signed and Approved By: LOW YOO MD on 9/21/2023 at 9:34             CT Chest Without Contrast Diagnostic    Result Date: 9/21/2023  PROCEDURE: CT CHEST WO CONTRAST DIAGNOSTIC  COMPARISON: None  INDICATIONS: Hematologic malignancy, assess treatment response  TECHNIQUE: CT images were created without the administration of contrast material.   PROTOCOL:   Standard imaging protocol performed    RADIATION:   DLP: 311.8 mGy*cm   Automated exposure control was utilized to minimize radiation dose.  FINDINGS:  There are small layering pleural effusions.  There is mild compressive atelectasis on both lower lobes.  There is mild-moderate emphysema.  There is cylindrical bronchiectasis in both lower lobes.  There is some slight interlobular septal thickening in the inferior lingula and the basilar segments of both lower lobes which may represent mild interstitial fibrosis.  There is no honeycombing.  There is mild bilateral apical subpleural scarring.  There are no suspicious pulmonary nodules or masses.  There are some patchy densities in the left apex and the posterior segment of the right upper lobe consistent with a nonspecific infectious/inflammatory process.  The heart size is normal.  There are atherosclerotic vascular calcifications including involvement of the coronary arteries.  The patient has had a previous CABG procedure.  The exam is limited by noncontrast technique.  There are no definite enlarged hilar or mediastinal lymph nodes.  There is thickening of the wall of the distal esophagus.  There is also a small hiatal hernia.  The findings are nonspecific.  Findings  beneath the hemidiaphragms were discussed under the separately dictated CT abdomen and pelvis report.  There are no suspicious osteolytic or sclerotic lesions within the bony structures.        1. Small layering pleural effusions with mild compressive atelectasis on both lower lobes. 2. Cylindrical bronchiectasis in both lower lobes. 3. Suggestion of mild fibrosis in the basilar segments of both lower lobes and the inferior lingula.  There is no honeycombing. 4. Mild-moderate emphysema. 5. A few small patchy areas of densities felt to represent a nonspecific infectious/inflammatory process. 6. Small hiatal hernia with thickening of the distal wall of the esophagus.  This is probably due to gastroesophageal reflux.      DANAE BLAIR MD       Electronically Signed and Approved By: DANAE BLAIR MD on 9/21/2023 at 15:01             XR Chest 1 View    Result Date: 9/18/2023  PROCEDURE: XR CHEST 1 VW  COMPARISON: 5/13/2022.  INDICATIONS: SOA/SOB/shortness of air/shortness of breath; persistent cough.  FINDINGS: A single AP upright portable chest radiograph is provided for review.  There is possible new airspace opacification in the left lung base, which may represent pneumonia.  Aspiration pneumonia is possible.  Probably no acute infiltrate on the right.  Low lung volumes are present.  There may be borderline cardiac enlargement.  The patient has undergone median sternotomy and CABG surgery.  Fractured sternotomy wires are noted, as before.  Minimal, if any, pleural effusion is seen.  Thoracic aortic atherosclerotic change is seen.  No pneumothorax is identified.  There is generalized osteopenia.  There may be old healed right-sided rib fractures.       There is possible new left basilar pneumonia.  Consider close interval clinical and if necessary imaging follow-up to ensure a benign progression.      Please note that portions of this note were completed with a voice recognition program.  MARISSA WHITE JR, MD        Electronically Signed and Approved By: MARISSA WHITE JR, MD on 9/18/2023 at 22:44                 LAB RESULTS:      Lab 09/23/23  0526 09/22/23  0452 09/21/23  0449 09/20/23 2350 09/20/23  1818 09/20/23  1143 09/20/23  0616 09/19/23  1014 09/18/23  2354 09/18/23  2107   WBC 5.41 5.49 5.05  --   --   --  6.20 7.33  --  6.19   HEMOGLOBIN 7.7* 7.9* 8.7* 8.9* 8.9*   < > 9.4* 8.9*  --  5.7*   HEMATOCRIT 23.4* 24.0* 28.8* 27.0* 27.3*   < > 30.4* 28.7*  --  17.4*   PLATELETS 59* 56* 63*  --   --   --  96* 92*  --  105*   NEUTROS ABS 3.08 3.17 3.22  --   --   --  3.92 5.35  --  4.18   IMMATURE GRANS (ABS) 0.04 0.03 0.03  --   --   --  0.04 0.15*  --  0.05   LYMPHS ABS 1.65 1.70 1.31  --   --   --  1.73 1.34  --  1.46   MONOS ABS 0.26 0.28 0.26  --   --   --  0.27 0.31  --  0.35   EOS ABS 0.27 0.20 0.13  --   --   --  0.15 0.08  --  0.07   MCV 95.5 95.2 102.5*  --   --   --  101.3* 99.0*  --  102.4*   LACTATE  --   --   --   --   --   --   --   --   --  2.0   LDH  --   --   --   --   --   --   --   --  183  --    PROTIME  --   --   --   --   --   --   --   --   --  16.8*   APTT  --   --   --   --   --   --   --   --   --  37.2*    < > = values in this interval not displayed.         Lab 09/23/23  0526 09/22/23 0452 09/21/23 0449 09/20/23  0616 09/19/23  1014 09/18/23  2107   SODIUM 137 137 138 137 138 140   POTASSIUM 4.2 3.6 3.8 3.7 4.3 4.8   CHLORIDE 105 106 107 105 108* 109*   CO2 22.6 21.7* 19.7* 18.1* 14.9* 17.7*   ANION GAP 9.4 9.3 11.3 13.9 15.1* 13.3   BUN 25* 28* 33* 42* 45* 52*   CREATININE 1.59* 1.71* 1.74* 1.94* 2.14* 2.41*   EGFR 41.8* 38.3* 37.5* 32.9* 29.2* 25.3*   GLUCOSE 191* 130* 163* 149* 189* 234*   CALCIUM 8.7 8.6 8.8 8.9 9.0 9.0   MAGNESIUM  --  1.9 2.1  --   --   --    PHOSPHORUS 2.6 2.0* 2.5 3.0  --   --    TSH  --   --   --   --   --  1.650         Lab 09/23/23  0526 09/22/23  0452 09/21/23  0449 09/20/23  0616 09/19/23  1332 09/18/23  2107   TOTAL PROTEIN 6.7 6.5 6.8  --   --  7.2   ALBUMIN 3.9  3.7 4.0 4.1 3.9 4.4   GLOBULIN  --   --   --   --   --  2.8   ALT (SGPT) 8 8 10  --   --  13   AST (SGOT) 9 9 11  --   --  14   BILIRUBIN 1.3* 1.4* 1.8*  --   --  0.9   INDIRECT BILIRUBIN 1.0 1.1 1.4  --   --   --    BILIRUBIN DIRECT 0.3 0.3 0.4*  --   --   --    ALK PHOS 79 76 84  --   --  88         Lab 09/18/23  2354 09/18/23  2204 09/18/23  2107   PROBNP  --   --  8,695.0*   HSTROP T 28* 29*  --    PROTIME  --   --  16.8*   INR  --   --  1.36*             Lab 09/19/23  1143 09/19/23  1014 09/18/23 2354 09/18/23  2107   IRON  --  221*  --   --    IRON SATURATION (TSAT)  --  84*  --   --    TIBC  --  264*  --   --    TRANSFERRIN  --  177*  --   --    FERRITIN  --  835.60*  --   --    FOLATE 13.30  --   --   --    VITAMIN B 12 >2,000*  --   --   --    ABO TYPING  --   --  O O   RH TYPING  --   --  Positive Positive   ANTIBODY SCREEN  --   --   --  Negative         Brief Urine Lab Results  (Last result in the past 365 days)        Color   Clarity   Blood   Leuk Est   Nitrite   Protein   CREAT   Urine HCG        09/18/23 2351 Yellow   Clear   Negative   Negative   Negative   Trace                 Microbiology Results (last 10 days)       ** No results found for the last 240 hours. **            CT Abdomen Pelvis Without Contrast    Result Date: 9/21/2023  Impression:    1. No adenopathy within the abdomen or pelvis.  2. Small hiatal hernia with nonspecific circumferential thickening of the distal esophagus and proximal stomach.  Neoplasm not excluded.  3. Small bilateral pleural effusions.  Bibasilar cylindrical bronchiectasis and probable chronic interstitial changes.  4. Indeterminate bilateral renal lesions.  These could be more definitively characterized with a contrasted study.  5. Cholelithiasis.  6. Left inguinal hernia containing fat and portion of descending colon without evidence of bowel obstruction.  6. Additional findings as given above.      LOW YOO MD       Electronically Signed and Approved By:  LOW YOO MD on 9/21/2023 at 9:34             CT Chest Without Contrast Diagnostic    Result Date: 9/21/2023  Impression:   1. Small layering pleural effusions with mild compressive atelectasis on both lower lobes. 2. Cylindrical bronchiectasis in both lower lobes. 3. Suggestion of mild fibrosis in the basilar segments of both lower lobes and the inferior lingula.  There is no honeycombing. 4. Mild-moderate emphysema. 5. A few small patchy areas of densities felt to represent a nonspecific infectious/inflammatory process. 6. Small hiatal hernia with thickening of the distal wall of the esophagus.  This is probably due to gastroesophageal reflux.      DANAE BLAIR MD       Electronically Signed and Approved By: DANAE BLAIR MD on 9/21/2023 at 15:01             XR Chest 1 View    Result Date: 9/18/2023  Impression:  There is possible new left basilar pneumonia.  Consider close interval clinical and if necessary imaging follow-up to ensure a benign progression.      Please note that portions of this note were completed with a voice recognition program.  MARISSA WHITE JR, MD       Electronically Signed and Approved By: MARISSA WHITE JR, MD on 9/18/2023 at 22:44                       Results for orders placed during the hospital encounter of 09/18/23    Adult Transthoracic Echo Complete W/ Cont if Necessary Per Protocol    Interpretation Summary    Left ventricular ejection fraction appears to be 56 - 60%.    Left ventricular wall thickness is consistent with mild concentric hypertrophy.    The left atrial cavity is mildly dilated.    Moderate tricuspid valve regurgitation is present.    Estimated right ventricular systolic pressure from tricuspid regurgitation is mildly elevated (35-45 mmHg).    There were no apparent intracardiac masses, vegetations or thrombi.      Labs Pending at Discharge:  Pending Labs       Order Current Status    MDS FISH Panel In process    Methylmalonic Acid, Serum In process              Time  spent on Discharge including face to face service:  32 minutes    Electronically signed by Zoila Jung MD, 09/23/23, 1:48 PM EDT.    Portions of this documentation were transcribed electronically from a voice recognition software.  I confirm all data accurately represents the service(s) I performed at today's visit.

## 2023-09-23 NOTE — DISCHARGE INSTR - ACTIVITY
Call your doctor or go to the nearest emergency room if you experience new fatigue, lightheadedness, dizziness, or weakness.

## 2023-09-23 NOTE — PLAN OF CARE
Goal Outcome Evaluation:      VSS. UOP. No c/o pain. Hgb stable. Discharge home today self care.

## 2023-09-24 LAB — METHYLMALONATE SERPL-SCNC: 232 NMOL/L (ref 0–378)

## 2023-09-26 LAB — MDS TARGETGENE PANEL RESULT: NORMAL

## 2023-09-27 ENCOUNTER — READMISSION MANAGEMENT (OUTPATIENT)
Dept: CALL CENTER | Facility: HOSPITAL | Age: 88
End: 2023-09-27
Payer: MEDICARE

## 2023-09-27 NOTE — OUTREACH NOTE
Medical Week 1 Survey      Flowsheet Row Responses   [facility] patient discharged from? [facility]   Does the patient have one of the following disease processes/diagnoses(primary or secondary)? Other   Week 1 attempt successful? Yes   Call start time 1414   Call end time 1416   Discharge diagnosis Symptomatic anemia   Person spoke with today (if not patient) and relationship Kendra Shafer reviewed with patient/caregiver? Yes   Is the patient having any side effects they believe may be caused by any medication additions or changes? No   Does the patient have all medications ordered at discharge? Yes   Is the patient taking all medications as directed (includes completed medication regime)? Yes   Does the patient have a primary care provider?  Yes   Does the patient have an appointment with their PCP within 7 days of discharge? Yes   Has the patient kept scheduled appointments due by today? N/A   [facility]   Has home health visited the patient within 72 hours of discharge? Yes   Psychosocial issues? No   Did the patient receive a copy of their discharge instructions? Yes   Nursing interventions Reviewed instructions with patient   What is the patient's perception of their health status since discharge? Improving   Is the patient/caregiver able to teach back signs and symptoms related to disease process for when to call PCP? Yes   Is the patient/caregiver able to teach back signs and symptoms related to disease process for when to call 911? Yes   Is the patient/caregiver able to teach back the hierarchy of who to call/visit for symptoms/problems? PCP, Specialist, Home health nurse, Urgent Care, ED, 911 Yes   Week 1 call completed? Yes   Graduated Yes   Graduated/Revoked comments ALY is coming in. Kendra is aware to make FU appts. Has spoke to PCP office.   Call end time 1416            NHAN SHIN - Registered Nurse

## 2023-09-29 NOTE — PROGRESS NOTES
"Enter Query Response Below      Query Response:    Acute heart failure with preserved ejection fraction   Electronically signed by Zoila Jung MD, 23, 2:39 PM EDT.             If applicable, please update the problem list.     Patient: Selvin Ferguson        : 1935  Account: [id number]           Admit Date: 2023        How to Respond to this query:       a. Click New Note     b. Answer query within the yellow box.                c. Update the Problem List, if applicable.      If you have any questions about this query contact me at: [email or web address]     ,    Patient is diagnosed with heart failure with preserved ejection fraction.  Discharge summary documents \"pulmonary edema and congestion seen on imaging, he was placed on diuretics in between of receiving blood products, he required 2 units...\"  Pro BNP level () 8695.  Home medications include Aspirin, Atorvastatin, Metoprolol succinate XL.  Treatment this stay included IV Lasix () and Metoprolol.    Please clarify if patient treated/monitored for one or more of the following:    Acute heart failure with preserved ejection fraction  Acute on chronic heart failure with preserved ejection fraction  Other- specify_____  Unable to determine    By submitting this query, we are merely seeking further clarification of documentation to accurately reflect all conditions that you are monitoring, evaluating, treating or that extend the hospitalization or utilize additional resources of care. Please utilize your independent clinical judgment when addressing the question(s) above.     This query and your response, once completed, will be entered into the legal medical record.    Sincerely,  Lili GTZ, RN  Clinical Documentation Integrity Program   [email or web address]  "

## 2023-09-29 NOTE — PROGRESS NOTES
"Enter Query Response Below      Query Response:   Acute blood loss anemia was not supported   Electronically signed by Zoila Jung MD, 23, 2:39 PM EDT.             If applicable, please update the problem list.     Patient: Selvin Ferguson        : 1935  Account: [id number]           Admit Date: 2023        How to Respond to this query:       a. Click New Note     b. Answer query within the yellow box.                c. Update the Problem List, if applicable.      If you have any questions about this query contact me at: [email or web address]     ,    Discharge summary documents concern for acute blood loss anemia, possible myelodysplastic syndrome versus anemia of chronic kidney disease.  GI consult note documents \"severe anemia with hemoglobin about 5.  There is no history of overt active GI bleeding.  Patient denies any abdominal  pain, nausea vomiting, heartburn, dysphagia, rectal bleeding, melena, hematemesis.\"  EGD  with no signs of bleeding.  Treatment included transfusion 2 units PRBCs.  Patient refusing to drink prep for colonoscopy.    After study, was acute blood loss anemia clinically supported during this admission?    Acute blood loss anemia was supported with additional clinical indicators:____________  Acute blood loss anemia was not supported  Other- specify_____________  Unable to determine    By submitting this query, we are merely seeking further clarification of documentation to accurately reflect all conditions that you are monitoring, evaluating, treating or that extend the hospitalization or utilize additional resources of care. Please utilize your independent clinical judgment when addressing the question(s) above.     This query and your response, once completed, will be entered into the legal medical record.    Sincerely,  Lili GTZ, RN  Clinical Documentation Integrity Program   [email or web address]  "

## 2023-10-04 ENCOUNTER — TELEPHONE (OUTPATIENT)
Dept: GASTROENTEROLOGY | Facility: CLINIC | Age: 88
End: 2023-10-04
Payer: MEDICARE

## 2023-10-04 NOTE — TELEPHONE ENCOUNTER
----- Message from CAYETANO Freire sent at 9/27/2023  8:14 AM EDT -----  I have reviewed the patient's most recent EGD and pathology performed inpatient.  Report shows medium size hiatal hernia with normal stomach and duodenum.  Duodenum biopsies are normal.  Colonoscopy was recommended while inpatient however not performed.  Please see if patient is willing to proceed at this time. Continue trending anemia with PCP.  Patient has follow-up appointment with Rosy Palomino 1/23/2024

## 2023-10-04 NOTE — TELEPHONE ENCOUNTER
Called pt, no answer, left message for pt to call back.    Postponing result until tomorrow 10.5.23

## 2023-10-05 ENCOUNTER — TELEPHONE (OUTPATIENT)
Dept: GASTROENTEROLOGY | Facility: CLINIC | Age: 88
End: 2023-10-05
Payer: MEDICARE

## 2023-10-05 NOTE — TELEPHONE ENCOUNTER
Pt needs Card[facility] to [facility]. Fax: [phone] and phone: [phone]    Blood thinner clearance needs to be sent to pt's PCP Kayla MONTEIRO Phone: [phone] and Fax: [phone]    Send on 11.1.23

## 2023-10-05 NOTE — TELEPHONE ENCOUNTER
Called pt, no answer, left message for pt to call back.    Postponing result until tomorrow 10.6.23

## 2023-10-05 NOTE — TELEPHONE ENCOUNTER
Pt's spouse (POA) returned call. She states pt would be agreeable to completing colonoscopy. I have scheduled pt for 12.12.23 at 12:30pm. She verified understanding of bowel prep instructions. I have mailed these instructions to pt. Please send in bowel prep to [facility] pharmacy

## 2023-10-06 LAB — CCV RESULT: NORMAL

## 2023-10-09 ENCOUNTER — PREP FOR SURGERY (OUTPATIENT)
Dept: OTHER | Facility: HOSPITAL | Age: 88
End: 2023-10-09
Payer: MEDICARE

## 2023-10-09 ENCOUNTER — HOSPITAL ENCOUNTER (OUTPATIENT)
Facility: HOSPITAL | Age: 88
Setting detail: OBSERVATION
LOS: 1 days | Discharge: NURSING, REHAB OR LONG-TERM CARE | End: 2023-10-14
Attending: INTERNAL MEDICINE | Admitting: INTERNAL MEDICINE
Payer: MEDICARE

## 2023-10-09 ENCOUNTER — TELEPHONE (OUTPATIENT)
Dept: GASTROENTEROLOGY | Facility: CLINIC | Age: 88
End: 2023-10-09
Payer: MEDICARE

## 2023-10-09 ENCOUNTER — ANESTHESIA EVENT (OUTPATIENT)
Dept: GASTROENTEROLOGY | Facility: HOSPITAL | Age: 88
End: 2023-10-09
Payer: MEDICARE

## 2023-10-09 DIAGNOSIS — D50.0 IRON DEFICIENCY ANEMIA DUE TO CHRONIC BLOOD LOSS: Primary | ICD-10-CM

## 2023-10-09 DIAGNOSIS — R26.2 DIFFICULTY WALKING: ICD-10-CM

## 2023-10-09 PROBLEM — D64.9 ANEMIA: Status: ACTIVE | Noted: 2023-10-09

## 2023-10-09 LAB
ABO GROUP BLD: NORMAL
ALBUMIN SERPL-MCNC: 3.7 G/DL (ref 3.5–5.2)
ALBUMIN/GLOB SERPL: 1.1 G/DL
ALP SERPL-CCNC: 86 U/L (ref 39–117)
ALT SERPL W P-5'-P-CCNC: 8 U/L (ref 1–41)
ANION GAP SERPL CALCULATED.3IONS-SCNC: 10.4 MMOL/L (ref 5–15)
AST SERPL-CCNC: 12 U/L (ref 1–40)
BILIRUB SERPL-MCNC: 1 MG/DL (ref 0–1.2)
BLD GP AB SCN SERPL QL: NEGATIVE
BUN SERPL-MCNC: 31 MG/DL (ref 8–23)
BUN/CREAT SERPL: 12.6 (ref 7–25)
CALCIUM SPEC-SCNC: 8.5 MG/DL (ref 8.6–10.5)
CHLORIDE SERPL-SCNC: 106 MMOL/L (ref 98–107)
CO2 SERPL-SCNC: 21.6 MMOL/L (ref 22–29)
CREAT SERPL-MCNC: 2.47 MG/DL (ref 0.76–1.27)
DEPRECATED RDW RBC AUTO: 72.3 FL (ref 37–54)
EGFRCR SERPLBLD CKD-EPI 2021: 24.6 ML/MIN/1.73
ERYTHROCYTE [DISTWIDTH] IN BLOOD BY AUTOMATED COUNT: 20.1 % (ref 12.3–15.4)
GLOBULIN UR ELPH-MCNC: 3.4 GM/DL
GLUCOSE SERPL-MCNC: 245 MG/DL (ref 65–99)
HCT VFR BLD AUTO: 19.7 % (ref 37.5–51)
HGB BLD-MCNC: 6 G/DL (ref 13–17.7)
MCH RBC QN AUTO: 30.6 PG (ref 26.6–33)
MCHC RBC AUTO-ENTMCNC: 30.5 G/DL (ref 31.5–35.7)
MCV RBC AUTO: 100.5 FL (ref 79–97)
PLATELET # BLD AUTO: 88 10*3/MM3 (ref 140–450)
PMV BLD AUTO: 11.3 FL (ref 6–12)
POTASSIUM SERPL-SCNC: 4.3 MMOL/L (ref 3.5–5.2)
PROT SERPL-MCNC: 7.1 G/DL (ref 6–8.5)
RBC # BLD AUTO: 1.96 10*6/MM3 (ref 4.14–5.8)
RH BLD: POSITIVE
SODIUM SERPL-SCNC: 138 MMOL/L (ref 136–145)
T&S EXPIRATION DATE: NORMAL
WBC NRBC COR # BLD: 4.92 10*3/MM3 (ref 3.4–10.8)

## 2023-10-09 PROCEDURE — 63710000001 SODIUM BICARBONATE 650 MG TABLET: Performed by: INTERNAL MEDICINE

## 2023-10-09 PROCEDURE — G0378 HOSPITAL OBSERVATION PER HR: HCPCS

## 2023-10-09 PROCEDURE — 86850 RBC ANTIBODY SCREEN: CPT | Performed by: INTERNAL MEDICINE

## 2023-10-09 PROCEDURE — A9270 NON-COVERED ITEM OR SERVICE: HCPCS | Performed by: INTERNAL MEDICINE

## 2023-10-09 PROCEDURE — 85027 COMPLETE CBC AUTOMATED: CPT | Performed by: INTERNAL MEDICINE

## 2023-10-09 PROCEDURE — 63710000001 ROSUVASTATIN 20 MG TABLET: Performed by: INTERNAL MEDICINE

## 2023-10-09 PROCEDURE — 63710000001 PANTOPRAZOLE 40 MG TABLET DELAYED-RELEASE: Performed by: INTERNAL MEDICINE

## 2023-10-09 PROCEDURE — 63710000001 TRAZODONE 50 MG TABLET: Performed by: INTERNAL MEDICINE

## 2023-10-09 PROCEDURE — 80053 COMPREHEN METABOLIC PANEL: CPT | Performed by: INTERNAL MEDICINE

## 2023-10-09 PROCEDURE — 86901 BLOOD TYPING SEROLOGIC RH(D): CPT | Performed by: INTERNAL MEDICINE

## 2023-10-09 PROCEDURE — 86900 BLOOD TYPING SEROLOGIC ABO: CPT | Performed by: INTERNAL MEDICINE

## 2023-10-09 RX ORDER — LISINOPRIL 5 MG/1
5 TABLET ORAL DAILY
Status: DISCONTINUED | OUTPATIENT
Start: 2023-10-09 | End: 2023-10-14 | Stop reason: HOSPADM

## 2023-10-09 RX ORDER — TRAZODONE HYDROCHLORIDE 50 MG/1
25 TABLET ORAL NIGHTLY
Status: DISCONTINUED | OUTPATIENT
Start: 2023-10-09 | End: 2023-10-14 | Stop reason: HOSPADM

## 2023-10-09 RX ORDER — SODIUM CHLORIDE 0.9 % (FLUSH) 0.9 %
10 SYRINGE (ML) INJECTION EVERY 12 HOURS SCHEDULED
Status: DISCONTINUED | OUTPATIENT
Start: 2023-10-09 | End: 2023-10-14 | Stop reason: HOSPADM

## 2023-10-09 RX ORDER — BISACODYL 5 MG/1
5 TABLET, DELAYED RELEASE ORAL DAILY PRN
Status: CANCELLED | OUTPATIENT
Start: 2023-10-09

## 2023-10-09 RX ORDER — ACETAMINOPHEN 325 MG/1
650 TABLET ORAL EVERY 4 HOURS PRN
Status: DISCONTINUED | OUTPATIENT
Start: 2023-10-09 | End: 2023-10-14 | Stop reason: HOSPADM

## 2023-10-09 RX ORDER — ONDANSETRON 2 MG/ML
4 INJECTION INTRAMUSCULAR; INTRAVENOUS EVERY 6 HOURS PRN
Status: DISCONTINUED | OUTPATIENT
Start: 2023-10-09 | End: 2023-10-14 | Stop reason: HOSPADM

## 2023-10-09 RX ORDER — SODIUM CHLORIDE 0.9 % (FLUSH) 0.9 %
10 SYRINGE (ML) INJECTION EVERY 12 HOURS SCHEDULED
Status: CANCELLED | OUTPATIENT
Start: 2023-10-09

## 2023-10-09 RX ORDER — SODIUM CHLORIDE 9 MG/ML
40 INJECTION, SOLUTION INTRAVENOUS AS NEEDED
Status: DISCONTINUED | OUTPATIENT
Start: 2023-10-09 | End: 2023-10-14 | Stop reason: HOSPADM

## 2023-10-09 RX ORDER — SODIUM CHLORIDE 0.9 % (FLUSH) 0.9 %
10 SYRINGE (ML) INJECTION AS NEEDED
Status: CANCELLED | OUTPATIENT
Start: 2023-10-09

## 2023-10-09 RX ORDER — TAMSULOSIN HYDROCHLORIDE 0.4 MG/1
0.4 CAPSULE ORAL DAILY
Status: DISCONTINUED | OUTPATIENT
Start: 2023-10-09 | End: 2023-10-14 | Stop reason: HOSPADM

## 2023-10-09 RX ORDER — POLYETHYLENE GLYCOL 3350 17 G/17G
17 POWDER, FOR SOLUTION ORAL DAILY PRN
Status: DISCONTINUED | OUTPATIENT
Start: 2023-10-09 | End: 2023-10-14 | Stop reason: HOSPADM

## 2023-10-09 RX ORDER — ALUMINA, MAGNESIA, AND SIMETHICONE 2400; 2400; 240 MG/30ML; MG/30ML; MG/30ML
15 SUSPENSION ORAL EVERY 6 HOURS PRN
Status: DISCONTINUED | OUTPATIENT
Start: 2023-10-09 | End: 2023-10-14 | Stop reason: HOSPADM

## 2023-10-09 RX ORDER — POLYETHYLENE GLYCOL 3350 17 G/17G
17 POWDER, FOR SOLUTION ORAL DAILY PRN
Status: CANCELLED | OUTPATIENT
Start: 2023-10-09

## 2023-10-09 RX ORDER — PANTOPRAZOLE SODIUM 40 MG/1
40 TABLET, DELAYED RELEASE ORAL 2 TIMES DAILY
Status: DISCONTINUED | OUTPATIENT
Start: 2023-10-09 | End: 2023-10-14 | Stop reason: HOSPADM

## 2023-10-09 RX ORDER — AMOXICILLIN 250 MG
2 CAPSULE ORAL 2 TIMES DAILY
Status: DISCONTINUED | OUTPATIENT
Start: 2023-10-09 | End: 2023-10-14 | Stop reason: HOSPADM

## 2023-10-09 RX ORDER — BISACODYL 10 MG
10 SUPPOSITORY, RECTAL RECTAL DAILY PRN
Status: DISCONTINUED | OUTPATIENT
Start: 2023-10-09 | End: 2023-10-14 | Stop reason: HOSPADM

## 2023-10-09 RX ORDER — FLUTICASONE PROPIONATE 50 MCG
2 SPRAY, SUSPENSION (ML) NASAL DAILY PRN
Status: DISCONTINUED | OUTPATIENT
Start: 2023-10-09 | End: 2023-10-14 | Stop reason: HOSPADM

## 2023-10-09 RX ORDER — METOPROLOL SUCCINATE 25 MG/1
25 TABLET, EXTENDED RELEASE ORAL DAILY
Status: DISCONTINUED | OUTPATIENT
Start: 2023-10-09 | End: 2023-10-14 | Stop reason: HOSPADM

## 2023-10-09 RX ORDER — ONDANSETRON 2 MG/ML
4 INJECTION INTRAMUSCULAR; INTRAVENOUS EVERY 6 HOURS PRN
Status: CANCELLED | OUTPATIENT
Start: 2023-10-09

## 2023-10-09 RX ORDER — CETIRIZINE HYDROCHLORIDE 10 MG/1
10 TABLET ORAL DAILY
Status: DISCONTINUED | OUTPATIENT
Start: 2023-10-09 | End: 2023-10-14 | Stop reason: HOSPADM

## 2023-10-09 RX ORDER — ROSUVASTATIN CALCIUM 20 MG/1
40 TABLET, COATED ORAL NIGHTLY
Status: DISCONTINUED | OUTPATIENT
Start: 2023-10-09 | End: 2023-10-14 | Stop reason: HOSPADM

## 2023-10-09 RX ORDER — SODIUM CHLORIDE 0.9 % (FLUSH) 0.9 %
10 SYRINGE (ML) INJECTION AS NEEDED
Status: DISCONTINUED | OUTPATIENT
Start: 2023-10-09 | End: 2023-10-14 | Stop reason: HOSPADM

## 2023-10-09 RX ORDER — ALUMINA, MAGNESIA, AND SIMETHICONE 2400; 2400; 240 MG/30ML; MG/30ML; MG/30ML
15 SUSPENSION ORAL EVERY 6 HOURS PRN
Status: CANCELLED | OUTPATIENT
Start: 2023-10-09

## 2023-10-09 RX ORDER — NITROGLYCERIN 0.4 MG/1
0.4 TABLET SUBLINGUAL
Status: DISCONTINUED | OUTPATIENT
Start: 2023-10-09 | End: 2023-10-14 | Stop reason: HOSPADM

## 2023-10-09 RX ORDER — AMLODIPINE BESYLATE 5 MG/1
10 TABLET ORAL DAILY
Status: DISCONTINUED | OUTPATIENT
Start: 2023-10-09 | End: 2023-10-14 | Stop reason: HOSPADM

## 2023-10-09 RX ORDER — SODIUM BICARBONATE 650 MG/1
1300 TABLET ORAL 2 TIMES DAILY
Status: DISCONTINUED | OUTPATIENT
Start: 2023-10-09 | End: 2023-10-14 | Stop reason: HOSPADM

## 2023-10-09 RX ORDER — SODIUM CHLORIDE 9 MG/ML
40 INJECTION, SOLUTION INTRAVENOUS AS NEEDED
Status: CANCELLED | OUTPATIENT
Start: 2023-10-09

## 2023-10-09 RX ORDER — BISACODYL 5 MG/1
5 TABLET, DELAYED RELEASE ORAL DAILY PRN
Status: DISCONTINUED | OUTPATIENT
Start: 2023-10-09 | End: 2023-10-14 | Stop reason: HOSPADM

## 2023-10-09 RX ORDER — ACETAMINOPHEN 325 MG/1
650 TABLET ORAL EVERY 4 HOURS PRN
Status: CANCELLED | OUTPATIENT
Start: 2023-10-09

## 2023-10-09 RX ORDER — AMOXICILLIN 250 MG
2 CAPSULE ORAL 2 TIMES DAILY
Status: CANCELLED | OUTPATIENT
Start: 2023-10-09

## 2023-10-09 RX ORDER — BISACODYL 10 MG
10 SUPPOSITORY, RECTAL RECTAL DAILY PRN
Status: CANCELLED | OUTPATIENT
Start: 2023-10-09

## 2023-10-09 RX ADMIN — Medication 10 ML: at 18:24

## 2023-10-09 RX ADMIN — CETIRIZINE HYDROCHLORIDE 10 MG: 10 TABLET, FILM COATED ORAL at 15:18

## 2023-10-09 RX ADMIN — PANTOPRAZOLE SODIUM 40 MG: 40 TABLET, DELAYED RELEASE ORAL at 21:20

## 2023-10-09 RX ADMIN — AMLODIPINE BESYLATE 10 MG: 5 TABLET ORAL at 15:18

## 2023-10-09 RX ADMIN — LISINOPRIL 5 MG: 5 TABLET ORAL at 15:18

## 2023-10-09 RX ADMIN — TAMSULOSIN HYDROCHLORIDE 0.4 MG: 0.4 CAPSULE ORAL at 15:18

## 2023-10-09 RX ADMIN — Medication 10 ML: at 21:20

## 2023-10-09 RX ADMIN — POLYETHYLENE GLYCOL 3350, SODIUM SULFATE ANHYDROUS, SODIUM BICARBONATE, SODIUM CHLORIDE, POTASSIUM CHLORIDE 4000 ML: 236; 22.74; 6.74; 5.86; 2.97 POWDER, FOR SOLUTION ORAL at 15:18

## 2023-10-09 RX ADMIN — TRAZODONE HYDROCHLORIDE 25 MG: 50 TABLET ORAL at 21:20

## 2023-10-09 RX ADMIN — METOPROLOL SUCCINATE 25 MG: 25 TABLET, EXTENDED RELEASE ORAL at 15:18

## 2023-10-09 RX ADMIN — ROSUVASTATIN 40 MG: 20 TABLET, FILM COATED ORAL at 21:20

## 2023-10-09 RX ADMIN — SODIUM BICARBONATE 650 MG TABLET 1300 MG: at 21:19

## 2023-10-09 NOTE — TELEPHONE ENCOUNTER
[facility] has reached out to schedule pt a sooner appointment due to low hemoglobin. I attempted to contact pt to offer an appointment next week, but left pt a VM to return call. I left the office number as a call back number.

## 2023-10-09 NOTE — ANESTHESIA PREPROCEDURE EVALUATION
Anesthesia Evaluation     Patient summary reviewed and Nursing notes reviewed   NPO Solid Status: > 8 hours  NPO Liquid Status: > 2 hours           Airway   Mallampati: III  TM distance: >3 FB  Neck ROM: full  No difficulty expected  Dental          Pulmonary    (+) COPD moderate,wheezes  Cardiovascular   Exercise tolerance: poor (<4 METS)    ECG reviewed  PT is on anticoagulation therapy  Patient on routine beta blocker  Rhythm: regular  Rate: normal    (+) hypertension well controlled 2 medications or greater, CAD, CABG >6 Months, hyperlipidemia      Neuro/Psych  (+) psychiatric history, dementia  GI/Hepatic/Renal/Endo    (+) GERD well controlled, renal disease CRI, diabetes mellitus type 2 well controlledLiver disease: CKD stage IV.    Musculoskeletal     Abdominal    Substance History      OB/GYN          Other        ROS/Med Hx Other: BORDERLINE ECG -  Sinus rhythm  Ventricular premature complex  Borderline T abnormalities, diffuse leads  When compared with ECG of 14-May-2022 11:27:00,  Significant repolarization change  Electronically Signed By: Laney Curtis ([facility] 21-Sep-2023 11:55:22  Date and Time of Study: 2023-09-18 20:59:51    9/20/23 Echo   Left ventricular ejection fraction appears to be 56 - 60%.  ú  Left ventricular wall thickness is consistent with mild concentric hypertrophy.  ú  The left atrial cavity is mildly dilated.  ú  Moderate tricuspid valve regurgitation is present.  ú  Estimated right ventricular systolic pressure from tricuspid regurgitation is mildly elevated (35-45 mmHg).     There were no apparent intracardiac masses, vegetations or thrombi.      Admit 10/9/23- severe anemia- transfuse 2 units PRBCs                      Anesthesia Plan    ASA 4     general   total IV anesthesia  intravenous induction     Anesthetic plan, risks, benefits, and alternatives have been provided, discussed and informed consent has been obtained with: patient.  Pre-procedure education provided  Plan  discussed with CRNA.        CODE STATUS:    Level Of Support Discussed With: Patient  Code Status (Patient has no pulse and is not breathing): CPR (Attempt to Resuscitate)  Medical Interventions (Patient has pulse or is breathing): Full Support

## 2023-10-09 NOTE — H&P (VIEW-ONLY)
Chief Complaint  No chief complaint on file.    History of Present Illness  Selvin Ferguson is a 87 y.o. male with history of dementia, diabetes, high cholesterol, hypertension, renal disorder who presents to [address] on referral from Tristin Reece MD for a gastroenterology evaluation of anemia.  Patient is positive for history of dementia and cannot answer any questions appropriately.  There is no overt GI bleeding noticed.  There is no history of melena.        Labs Result Review Imaging    Past Medical History:   Diagnosis Date    Dementia     Diabetes mellitus     Hyperlipidemia     Hypertension     Renal disorder        Past Surgical History:   Procedure Laterality Date    CATARACT EXTRACTION      COLONOSCOPY      CORONARY ARTERY BYPASS GRAFT      CYSTOSCOPY      ENDOSCOPY N/A 9/20/2023    Procedure: ESOPHAGOGASTRODUODENOSCOPY WITH COLD BIOPSIES;  Surgeon: Renny Tong MD;  Location: [facility] ENDOSCOPY;  Service: Gastroenterology;  Laterality: N/A;  HIATAL HERNIA    PROSTATE SURGERY           Current Facility-Administered Medications:     acetaminophen (TYLENOL) tablet 650 mg, 650 mg, Oral, Q4H PRN, Tristin Reece MD    aluminum-magnesium hydroxide-simethicone (MAALOX MAX) 400-400-40 MG/5ML suspension 15 mL, 15 mL, Oral, Q6H PRN, Tristin Reece MD    amLODIPine (NORVASC) tablet 10 mg, 10 mg, Oral, Daily, Tristin Reece MD    sennosides-docusate (PERICOLACE) 8.6-50 MG per tablet 2 tablet, 2 tablet, Oral, BID **AND** polyethylene glycol (MIRALAX) packet 17 g, 17 g, Oral, Daily PRN **AND** bisacodyl (DULCOLAX) EC tablet 5 mg, 5 mg, Oral, Daily PRN **AND** bisacodyl (DULCOLAX) suppository 10 mg, 10 mg, Rectal, Daily PRN, Tristin Reece MD    cetirizine (zyrTEC) tablet 10 mg, 10 mg, Oral, Daily, Tristin Reece MD    fluticasone (FLONASE) 50 MCG/ACT nasal spray 2 spray, 2 spray, Each Nare, Daily PRN, Tristin Reece MD    lisinopril (PRINIVIL,ZESTRIL) tablet 5 mg, 5  mg, Oral, Daily, Tristin Reece MD    metoprolol succinate XL (TOPROL-XL) 24 hr tablet 25 mg, 25 mg, Oral, Daily, Tristin Reece MD    nitroglycerin (NITROSTAT) SL tablet 0.4 mg, 0.4 mg, Sublingual, Q5 Min PRN, Tristin Reece MD    ondansetron (ZOFRAN) injection 4 mg, 4 mg, Intravenous, Q6H PRN, Tristin Reece MD    pantoprazole (PROTONIX) EC tablet 40 mg, 40 mg, Oral, BID, Tristin Reece MD    polyethylene glycol (GoLYTELY) solution 4,000 mL, 4,000 mL, Oral, Once, Tristin Reece MD    rosuvastatin (CRESTOR) tablet 40 mg, 40 mg, Oral, Nightly, Tristin Reece MD    sodium bicarbonate tablet 1,300 mg, 1,300 mg, Oral, BID, Tristin Reece MD    sodium chloride 0.9 % flush 10 mL, 10 mL, Intravenous, Q12H, Tristin Reece MD    sodium chloride 0.9 % flush 10 mL, 10 mL, Intravenous, PRN, Tristin Reece MD    sodium chloride 0.9 % infusion 40 mL, 40 mL, Intravenous, PRN, Tristin Reece MD    tamsulosin (FLOMAX) 24 hr capsule 0.4 mg, 0.4 mg, Oral, Daily, Tristin Reece MD    traZODone (DESYREL) tablet 25 mg, 25 mg, Oral, Nightly, Tristin Reece MD     No Known Allergies    Family History   Family history unknown: Yes   Patient is confused    Social History     Social History Narrative    Not on file     Patient confused in detail social history cannot be obtained  Immunization:  Immunization History   Administered Date(s) Administered    COVID-19 (MODERNA) Monovalent Original Booster 12/07/2021        Objective     Review of Systems patient denies any symptoms when he is confused so the review of system cannot be properly obtained.    Vital Signs:   /44 (BP Location: Left arm, Patient Position: Lying)   Pulse 77   SpO2 94%       Physical Exam  Constitutional:       General: He is awake. He is not in acute distress.     Appearance: He is well-developed and well-groomed.   HENT:      Head: Normocephalic and atraumatic.      Mouth/Throat:      Mouth: Mucous membranes are moist.      Comments:  "Ghislaine dental hygiene is good  Eyes:      General: Lids are normal.      Conjunctiva/sclera: Conjunctivae normal.      Pupils: Pupils are equal, round, and reactive to light.   Neck:      Thyroid: No thyroid mass.      Trachea: Trachea normal.   Cardiovascular:      Rate and Rhythm: Normal rate and regular rhythm.      Heart sounds: Normal heart sounds.   Pulmonary:      Effort: Pulmonary effort is normal.      Breath sounds: Normal breath sounds and air entry.   Abdominal:      General: Abdomen is flat. Bowel sounds are normal. There is no distension.      Palpations: Abdomen is soft. There is no mass.      Tenderness: There is no abdominal tenderness. There is no guarding.   Musculoskeletal:      Cervical back: Neck supple.      Right lower leg: No edema.      Left lower leg: No edema.   Skin:     General: Skin is warm and moist.      Coloration: Skin is not cyanotic, jaundiced or pale.      Findings: No rash.      Nails: There is no clubbing.         Labs:  Results from last 7 days   Lab Units 10/09/23  1313   WBC 10*3/mm3 4.92   HEMOGLOBIN g/dL 6.0*   HEMATOCRIT % 19.7*   PLATELETS 10*3/mm3 88*            Invalid input(s): \"LABALBU\", \"PROT\"          Assessment & Plan:  Principal Problem:    Anemia  Plan do colonoscopy in the morning.  Risk and benefits have been discussed with the wife in the past.    Patient was given instructions and counseling regarding his condition or for health maintenance advice. Please see specific information pulled into the AVS if appropriate.        Signed:  Stacy Tong MD  10/09/23  14:11 EDT    "

## 2023-10-09 NOTE — H&P
[facility]   HISTORY AND PHYSICAL    Patient Name: Selvin Ferguson  : 1935  MRN: [id number]  Primary Care Physician:  Kayla Renee APRN  Date of admission: 10/9/2023    Subjective   Subjective     Chief Complaint: Patient was seen for follow-up his hemoglobin has dropped down to 6 g he was also supposed to get her colonoscopy we will get her Dr. Lei to perform a colonoscopy but in the meantime we will transfuse 2 units of packed RBCs he has been getting Procrit injection as an outpatient [place] were not responding or maybe is losing the broad     HPI: Possible loss of blood causing his anemia 6 g feeling very weak so we will transfuse him with 2 Units of  packed rbc's    Selvin Ferguson is a 87 y.o. male Patient with anemia possible MDS bone marrow aspiration and biopsy has been done as an outpatient    Review of Systems   All systems were reviewed and negative except for: Has been reviewed    Personal History     Past Medical History:   Diagnosis Date    Dementia     Diabetes mellitus     Hyperlipidemia     Hypertension     Renal disorder        Past Surgical History:   Procedure Laterality Date    CATARACT EXTRACTION      COLONOSCOPY      CORONARY ARTERY BYPASS GRAFT      CYSTOSCOPY      ENDOSCOPY N/A 2023    Procedure: ESOPHAGOGASTRODUODENOSCOPY WITH COLD BIOPSIES;  Surgeon: Renny Tong MD;  Location: [facility] ENDOSCOPY;  Service: Gastroenterology;  Laterality: N/A;  HIATAL HERNIA    PROSTATE SURGERY         Family History: Family history is unknown by patient. Otherwise pertinent FHx was reviewed and not pertinent to current issue.    Social History:  reports that he quit smoking about 10 years ago. His smoking use included cigarettes. He smoked an average of 1 pack per day. He has never used smokeless tobacco. He reports that he does not currently use alcohol. He reports that he does not use drugs.    Home Medications:  Darbepoetin New, amLODIPine, aspirin, clopidogrel,  fexofenadine, fluticasone, hydrophilic ointment, lidocaine, lisinopril, metoprolol succinate XL, nitroglycerin, pantoprazole, polyethylene glycol, rosuvastatin, sodium bicarbonate, tamsulosin, and traZODone      Allergies:  No Known Allergies    Objective   Objective     Vitals:      Physical Exam    Constitutional: Alert oriented not in acute distress   Eyes: Pupils equal reactive to light and accommodation   HENT: Normal   Neck: Normal   Respiratory: No rales or rhonchi   Cardiovascular: S1 and S2 normal no S3 or S4   Gastrointestinal: Normal   Musculoskeletal: Normal   Neurologic: No localizing signs  Skin: No rash    Result Review    Result Review:  I have personally reviewed the results from the time of this admission to 10/9/2023 12:54 EDT and agree with these findings:  Anemia   LaboratoryAnemia  []  Microbiology  []  Radiology  []  EKG/Telemetry   []  Cardiology/Vascular   []  Pathology  []  Old records  []  Other:  Most notable findings include: Has been reviewed    Assessment & Plan   Assessment / Plan     Brief Patient Summary:  Selvin Ferguson is a 87 y.o. male who Patient with severe anemia hemoglobin of 6    Active Hospital Problems:  Active Hospital Problems    Diagnosis     **Anemia        Plan:   Transfuse 2 units of packed RBCs we'll get a colonoscopy    DVT prophylaxis:  Mechanical DVT prophylaxis orders are present.    CODE STATUS:    Level Of Support Discussed With: Patient  Code Status (Patient has no pulse and is not breathing): CPR (Attempt to Resuscitate)  Medical Interventions (Patient has pulse or is breathing): Full Support      Admission Status:  I believe this patient meets Observation status.    Electronically signed by Tristin Reece MD, 10/09/23, 12:54 PM EDT.      Part of this note may be an electronic transcription/translation of spoken language to printed text using the Dragon Dictation System.

## 2023-10-09 NOTE — PLAN OF CARE
Goal Outcome Evaluation:  Plan of Care Reviewed With: patient        Progress: no change  Outcome Evaluation: New admission. first unit of PRBC started. Patient pleasantly confused. NPO after midnight for colonoscopy tomorrow.

## 2023-10-09 NOTE — CONSULTS
Chief Complaint  No chief complaint on file.    History of Present Illness  Selvin Ferguson is a 87 y.o. male with history of dementia, diabetes, high cholesterol, hypertension, renal disorder who presents to [address] on referral from Tristin Reece MD for a gastroenterology evaluation of anemia.  Patient is positive for history of dementia and cannot answer any questions appropriately.  There is no overt GI bleeding noticed.  There is no history of melena.        Labs Result Review Imaging    Past Medical History:   Diagnosis Date    Dementia     Diabetes mellitus     Hyperlipidemia     Hypertension     Renal disorder        Past Surgical History:   Procedure Laterality Date    CATARACT EXTRACTION      COLONOSCOPY      CORONARY ARTERY BYPASS GRAFT      CYSTOSCOPY      ENDOSCOPY N/A 9/20/2023    Procedure: ESOPHAGOGASTRODUODENOSCOPY WITH COLD BIOPSIES;  Surgeon: Renny Tong MD;  Location: [facility] ENDOSCOPY;  Service: Gastroenterology;  Laterality: N/A;  HIATAL HERNIA    PROSTATE SURGERY           Current Facility-Administered Medications:     acetaminophen (TYLENOL) tablet 650 mg, 650 mg, Oral, Q4H PRN, Tristin Reece MD    aluminum-magnesium hydroxide-simethicone (MAALOX MAX) 400-400-40 MG/5ML suspension 15 mL, 15 mL, Oral, Q6H PRN, Tristin Reece MD    amLODIPine (NORVASC) tablet 10 mg, 10 mg, Oral, Daily, Tristin Reece MD    sennosides-docusate (PERICOLACE) 8.6-50 MG per tablet 2 tablet, 2 tablet, Oral, BID **AND** polyethylene glycol (MIRALAX) packet 17 g, 17 g, Oral, Daily PRN **AND** bisacodyl (DULCOLAX) EC tablet 5 mg, 5 mg, Oral, Daily PRN **AND** bisacodyl (DULCOLAX) suppository 10 mg, 10 mg, Rectal, Daily PRN, Tristin Reece MD    cetirizine (zyrTEC) tablet 10 mg, 10 mg, Oral, Daily, Tristin Reece MD    fluticasone (FLONASE) 50 MCG/ACT nasal spray 2 spray, 2 spray, Each Nare, Daily PRN, Tristin Reece MD    lisinopril (PRINIVIL,ZESTRIL) tablet 5 mg, 5  Detail Level: Zone mg, Oral, Daily, Tristin Reece MD    metoprolol succinate XL (TOPROL-XL) 24 hr tablet 25 mg, 25 mg, Oral, Daily, Tristin Reece MD    nitroglycerin (NITROSTAT) SL tablet 0.4 mg, 0.4 mg, Sublingual, Q5 Min PRN, Tristin Reece MD    ondansetron (ZOFRAN) injection 4 mg, 4 mg, Intravenous, Q6H PRN, Tristin Reece MD    pantoprazole (PROTONIX) EC tablet 40 mg, 40 mg, Oral, BID, Tristin Reece MD    polyethylene glycol (GoLYTELY) solution 4,000 mL, 4,000 mL, Oral, Once, Tristin Reece MD    rosuvastatin (CRESTOR) tablet 40 mg, 40 mg, Oral, Nightly, Tristin Reece MD    sodium bicarbonate tablet 1,300 mg, 1,300 mg, Oral, BID, Tristin Reece MD    sodium chloride 0.9 % flush 10 mL, 10 mL, Intravenous, Q12H, Tirstin Reece MD    sodium chloride 0.9 % flush 10 mL, 10 mL, Intravenous, PRN, Tristin Reece MD    sodium chloride 0.9 % infusion 40 mL, 40 mL, Intravenous, PRN, Tristin Reece MD    tamsulosin (FLOMAX) 24 hr capsule 0.4 mg, 0.4 mg, Oral, Daily, Tristin Reece MD    traZODone (DESYREL) tablet 25 mg, 25 mg, Oral, Nightly, Tristin Reece MD     No Known Allergies    Family History   Family history unknown: Yes   Patient is confused    Social History     Social History Narrative    Not on file     Patient confused in detail social history cannot be obtained  Immunization:  Immunization History   Administered Date(s) Administered    COVID-19 (MODERNA) Monovalent Original Booster 12/07/2021        Objective     Review of Systems patient denies any symptoms when he is confused so the review of system cannot be properly obtained.    Vital Signs:   /44 (BP Location: Left arm, Patient Position: Lying)   Pulse 77   SpO2 94%       Physical Exam  Constitutional:       General: He is awake. He is not in acute distress.     Appearance: He is well-developed and well-groomed.   HENT:      Head: Normocephalic and atraumatic.      Mouth/Throat:      Mouth: Mucous membranes are moist.      Comments:  "Ghislaine dental hygiene is good  Eyes:      General: Lids are normal.      Conjunctiva/sclera: Conjunctivae normal.      Pupils: Pupils are equal, round, and reactive to light.   Neck:      Thyroid: No thyroid mass.      Trachea: Trachea normal.   Cardiovascular:      Rate and Rhythm: Normal rate and regular rhythm.      Heart sounds: Normal heart sounds.   Pulmonary:      Effort: Pulmonary effort is normal.      Breath sounds: Normal breath sounds and air entry.   Abdominal:      General: Abdomen is flat. Bowel sounds are normal. There is no distension.      Palpations: Abdomen is soft. There is no mass.      Tenderness: There is no abdominal tenderness. There is no guarding.   Musculoskeletal:      Cervical back: Neck supple.      Right lower leg: No edema.      Left lower leg: No edema.   Skin:     General: Skin is warm and moist.      Coloration: Skin is not cyanotic, jaundiced or pale.      Findings: No rash.      Nails: There is no clubbing.         Labs:  Results from last 7 days   Lab Units 10/09/23  1313   WBC 10*3/mm3 4.92   HEMOGLOBIN g/dL 6.0*   HEMATOCRIT % 19.7*   PLATELETS 10*3/mm3 88*            Invalid input(s): \"LABALBU\", \"PROT\"          Assessment & Plan:  Principal Problem:    Anemia  Plan do colonoscopy in the morning.  Risk and benefits have been discussed with the wife in the past.    Patient was given instructions and counseling regarding his condition or for health maintenance advice. Please see specific information pulled into the AVS if appropriate.        Signed:  Stacy Tong MD  10/09/23  14:11 EDT    "

## 2023-10-09 NOTE — TELEPHONE ENCOUNTER
I spoke with patient's wife. She states pt is being admitted per Dr. Reece. We discussed the possibility of a gastro consult while he is admitted. She is aware to contact the office if he needs any follow up after being discharged.

## 2023-10-10 ENCOUNTER — ANESTHESIA (OUTPATIENT)
Dept: GASTROENTEROLOGY | Facility: HOSPITAL | Age: 88
End: 2023-10-10
Payer: MEDICARE

## 2023-10-10 LAB
ALBUMIN SERPL-MCNC: >18 G/DL (ref 3.5–5.2)
ALBUMIN/GLOB SERPL: ABNORMAL {RATIO}
ALP SERPL-CCNC: 81 U/L (ref 39–117)
ALT SERPL W P-5'-P-CCNC: 8 U/L (ref 1–41)
ANION GAP SERPL CALCULATED.3IONS-SCNC: 12.4 MMOL/L (ref 5–15)
AST SERPL-CCNC: 13 U/L (ref 1–40)
BILIRUB SERPL-MCNC: 1 MG/DL (ref 0–1.2)
BUN SERPL-MCNC: 27 MG/DL (ref 8–23)
BUN/CREAT SERPL: 11.9 (ref 7–25)
CALCIUM SPEC-SCNC: 7.8 MG/DL (ref 8.6–10.5)
CHLORIDE SERPL-SCNC: 107 MMOL/L (ref 98–107)
CO2 SERPL-SCNC: 20.6 MMOL/L (ref 22–29)
CREAT SERPL-MCNC: 2.26 MG/DL (ref 0.76–1.27)
DEPRECATED RDW RBC AUTO: 59.2 FL (ref 37–54)
EGFRCR SERPLBLD CKD-EPI 2021: 27.4 ML/MIN/1.73
ERYTHROCYTE [DISTWIDTH] IN BLOOD BY AUTOMATED COUNT: 18.8 % (ref 12.3–15.4)
GLOBULIN UR ELPH-MCNC: ABNORMAL MG/DL
GLUCOSE SERPL-MCNC: 173 MG/DL (ref 65–99)
HCT VFR BLD AUTO: 26.7 % (ref 37.5–51)
HGB BLD-MCNC: 8.8 G/DL (ref 13–17.7)
INR PPP: 1.27 (ref 0.86–1.15)
MCH RBC QN AUTO: 30.3 PG (ref 26.6–33)
MCHC RBC AUTO-ENTMCNC: 33 G/DL (ref 31.5–35.7)
MCV RBC AUTO: 92.1 FL (ref 79–97)
PLATELET # BLD AUTO: 77 10*3/MM3 (ref 140–450)
PMV BLD AUTO: 12.3 FL (ref 6–12)
POTASSIUM SERPL-SCNC: 4.2 MMOL/L (ref 3.5–5.2)
PROT SERPL-MCNC: 0.4 G/DL (ref 6–8.5)
PROTHROMBIN TIME: 16 SECONDS (ref 11.8–14.9)
RBC # BLD AUTO: 2.9 10*6/MM3 (ref 4.14–5.8)
SODIUM SERPL-SCNC: 140 MMOL/L (ref 136–145)
WBC NRBC COR # BLD: 7.25 10*3/MM3 (ref 3.4–10.8)

## 2023-10-10 PROCEDURE — 63710000001 TAMSULOSIN 0.4 MG CAPSULE: Performed by: INTERNAL MEDICINE

## 2023-10-10 PROCEDURE — A9270 NON-COVERED ITEM OR SERVICE: HCPCS | Performed by: INTERNAL MEDICINE

## 2023-10-10 PROCEDURE — 85610 PROTHROMBIN TIME: CPT | Performed by: INTERNAL MEDICINE

## 2023-10-10 PROCEDURE — 80053 COMPREHEN METABOLIC PANEL: CPT | Performed by: INTERNAL MEDICINE

## 2023-10-10 PROCEDURE — 63710000001 SODIUM BICARBONATE 650 MG TABLET: Performed by: INTERNAL MEDICINE

## 2023-10-10 PROCEDURE — 85027 COMPLETE CBC AUTOMATED: CPT | Performed by: INTERNAL MEDICINE

## 2023-10-10 PROCEDURE — 63710000001 TRAZODONE 50 MG TABLET: Performed by: INTERNAL MEDICINE

## 2023-10-10 PROCEDURE — 63710000001 METOPROLOL SUCCINATE XL 25 MG TABLET SUSTAINED-RELEASE 24 HOUR: Performed by: INTERNAL MEDICINE

## 2023-10-10 PROCEDURE — 97161 PT EVAL LOW COMPLEX 20 MIN: CPT

## 2023-10-10 PROCEDURE — 63710000001 CETIRIZINE 10 MG TABLET: Performed by: INTERNAL MEDICINE

## 2023-10-10 PROCEDURE — 63710000001 SENNOSIDES-DOCUSATE 8.6-50 MG TABLET: Performed by: INTERNAL MEDICINE

## 2023-10-10 PROCEDURE — 63710000001 POLYETHYLENE GLYCOL 236 G RECONSTITUTED SOLUTION 4,000 ML BOTTLE: Performed by: INTERNAL MEDICINE

## 2023-10-10 PROCEDURE — 63710000001 AMLODIPINE 5 MG TABLET: Performed by: INTERNAL MEDICINE

## 2023-10-10 PROCEDURE — 63710000001 LISINOPRIL 5 MG TABLET: Performed by: INTERNAL MEDICINE

## 2023-10-10 PROCEDURE — G0378 HOSPITAL OBSERVATION PER HR: HCPCS

## 2023-10-10 PROCEDURE — 63710000001 ROSUVASTATIN 20 MG TABLET: Performed by: INTERNAL MEDICINE

## 2023-10-10 PROCEDURE — 63710000001 PANTOPRAZOLE 40 MG TABLET DELAYED-RELEASE: Performed by: INTERNAL MEDICINE

## 2023-10-10 RX ADMIN — PANTOPRAZOLE SODIUM 40 MG: 40 TABLET, DELAYED RELEASE ORAL at 21:25

## 2023-10-10 RX ADMIN — Medication 10 ML: at 08:19

## 2023-10-10 RX ADMIN — AMLODIPINE BESYLATE 10 MG: 5 TABLET ORAL at 08:17

## 2023-10-10 RX ADMIN — Medication 10 ML: at 21:26

## 2023-10-10 RX ADMIN — POLYETHYLENE GLYCOL 3350, SODIUM SULFATE ANHYDROUS, SODIUM BICARBONATE, SODIUM CHLORIDE, POTASSIUM CHLORIDE 4000 ML: 236; 22.74; 6.74; 5.86; 2.97 POWDER, FOR SOLUTION ORAL at 18:42

## 2023-10-10 RX ADMIN — CETIRIZINE HYDROCHLORIDE 10 MG: 10 TABLET, FILM COATED ORAL at 08:17

## 2023-10-10 RX ADMIN — SODIUM BICARBONATE 650 MG TABLET 1300 MG: at 08:18

## 2023-10-10 RX ADMIN — DOCUSATE SODIUM 50MG AND SENNOSIDES 8.6MG 2 TABLET: 8.6; 5 TABLET, FILM COATED ORAL at 08:18

## 2023-10-10 RX ADMIN — TRAZODONE HYDROCHLORIDE 25 MG: 50 TABLET ORAL at 21:25

## 2023-10-10 RX ADMIN — PANTOPRAZOLE SODIUM 40 MG: 40 TABLET, DELAYED RELEASE ORAL at 08:17

## 2023-10-10 RX ADMIN — SODIUM BICARBONATE 650 MG TABLET 1300 MG: at 21:25

## 2023-10-10 RX ADMIN — TAMSULOSIN HYDROCHLORIDE 0.4 MG: 0.4 CAPSULE ORAL at 08:18

## 2023-10-10 RX ADMIN — DOCUSATE SODIUM 50MG AND SENNOSIDES 8.6MG 2 TABLET: 8.6; 5 TABLET, FILM COATED ORAL at 21:26

## 2023-10-10 RX ADMIN — LISINOPRIL 5 MG: 5 TABLET ORAL at 08:17

## 2023-10-10 RX ADMIN — ROSUVASTATIN 40 MG: 20 TABLET, FILM COATED ORAL at 21:26

## 2023-10-10 RX ADMIN — METOPROLOL SUCCINATE 25 MG: 25 TABLET, EXTENDED RELEASE ORAL at 08:18

## 2023-10-10 NOTE — THERAPY EVALUATION
Acute Care - Physical Therapy Initial Evaluation  [facility]     Patient Name: Selvin Ferguson  : 1935  MRN: [id number]  Today's Date: 10/10/2023      Visit Dx:     ICD-10-CM ICD-9-CM   1. 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 Difficulty walking  R26.2 719.7     Patient Active Problem List   Diagnosis    Acute renal failure superimposed on chronic kidney disease    Uremia    Urinary retention    Bilateral hydronephrosis    Type 2 diabetes mellitus    Symptomatic anemia    Essential hypertension    CAD (coronary artery disease)    History of dementia    Chronic kidney disease, stage IV (severe)    Abnormal metabolic state due to diabetes mellitus    Advanced dementia    BPH (benign prostatic hyperplasia)    Chronic obstructive pulmonary disease, unspecified    Gastroesophageal reflux disease    Hypertensive heart disease with heart failure    Neuropathy    Anemia     Past Medical History:   Diagnosis Date    Dementia     Diabetes mellitus     Hyperlipidemia     Hypertension     Renal disorder      Past Surgical History:   Procedure Laterality Date    CATARACT EXTRACTION      COLONOSCOPY      CORONARY ARTERY BYPASS GRAFT      CYSTOSCOPY      ENDOSCOPY N/A 2023    Procedure: ESOPHAGOGASTRODUODENOSCOPY WITH COLD BIOPSIES;  Surgeon: Renny Tong MD;  Location: [facility] ENDOSCOPY;  Service: Gastroenterology;  Laterality: N/A;  HIATAL HERNIA    PROSTATE SURGERY       PT Assessment (last 12 hours)       PT Evaluation and Treatment       Row Name 10/10/23 1105          Physical Therapy Time and Intention    Subjective Information complains of;fatigue;weakness  -AV     Document Type evaluation  -AV     Mode of Treatment individual therapy;physical therapy  -AV     Patient Effort good  -AV       Row Name 10/10/23 1101          General Information    Patient Profile Reviewed yes  -AV     Patient Observations alert;cooperative;agree to therapy  -AV     Prior Level of Function  independent:;all household mobility;ADL's  -AV     Equipment Currently Used at Home none  -AV     Existing Precautions/Restrictions fall  -AV       Row Name 10/10/23 1105          Living Environment    Current Living Arrangements home  -AV     Home Accessibility wheelchair accessible  -AV     People in Home spouse  -AV     Primary Care Provided by self  -AV       Row Name 10/10/23 1105          Home Use of Assistive/Adaptive Equipment    Equipment Currently Used at Home none  -AV       Row Name 10/10/23 1105          Range of Motion (ROM)    Range of Motion bilateral lower extremities;ROM is WFL  -AV       Row Name 10/10/23 1105          Strength (Manual Muscle Testing)    Strength (Manual Muscle Testing) bilateral lower extremities;strength is WFL;other (see comments)  5/5 BLE strength  -AV       Row Name 10/10/23 1105          Bed Mobility    Bed Mobility bed mobility (all) activities  -AV     All Activities, Teller (Bed Mobility) independent  -AV       Row Name 10/10/23 1105          Transfers    Transfers other (see comments)  Pt declined Tfs  -AV       Row Name 10/10/23 1105          Gait/Stairs (Locomotion)    Gait/Stairs Locomotion other (see comments)  Pt declined AMB  -AV       Row Name 10/10/23 1105          Safety Issues, Functional Mobility    Impairments Affecting Function (Mobility) balance;endurance/activity tolerance  -AV       Row Name 10/10/23 1105          Plan of Care Review    Plan of Care Reviewed With patient;spouse  -AV     Outcome Evaluation Pt presents in a deconditioned state secondary to recent hospital stay. He lacks functional endurance. He requires skilled PT services at this time to address these deficits so that he can safely return to a premorbid level of function.  -AV       Row Name 10/10/23 1105          Therapy Assessment/Plan (PT)    Rehab Potential (PT) good, to achieve stated therapy goals  -AV     Criteria for Skilled Interventions Met (PT) yes;skilled treatment is  necessary  -AV     Therapy Frequency (PT) daily  -AV     Predicted Duration of Therapy Intervention (PT) 10 days  -AV     Problem List (PT) problems related to;balance;mobility  -AV     Activity Limitations Related to Problem List (PT) unable to ambulate safely;unable to transfer safely;BADLs not performed adequately or safely;IADLs not performed adequately or safely;community activities not performed adequately or safely  -AV       Row Name 10/10/23 1105          Therapy Plan Review/Discharge Plan (PT)    Therapy Plan Review (PT) evaluation/treatment results reviewed;care plan/treatment goals reviewed;participants included;patient  -AV       Row Name 10/10/23 1105          Physical Therapy Goals    Transfer Goal Selection (PT) transfer, PT goal 1  -AV     Gait Training Goal Selection (PT) gait training, PT goal 1  -AV     Balance Goal Selection (PT) balance, PT goal 1  -AV       Row Name 10/10/23 1105          Transfer Goal 1 (PT)    Activity/Assistive Device (Transfer Goal 1, PT) transfers, all  -AV     [place] Level/Cues Needed (Transfer Goal 1, PT) independent  -AV     Time Frame (Transfer Goal 1, PT) long term goal (LTG);10 days  -AV       Row Name 10/10/23 1105          Gait Training Goal 1 (PT)    Activity/Assistive Device (Gait Training Goal 1, PT) gait (walking locomotion)  -AV     [place] Level (Gait Training Goal 1, PT) independent  -AV     Distance (Gait Training Goal 1, PT) 200  -AV     Time Frame (Gait Training Goal 1, PT) long term goal (LTG);10 days  -AV       Row Name 10/10/23 1105          Balance Goal 1 (PT)    [place] Level/Cues Needed (Balance Goal 1, PT) independent  -AV     Time Frame (Balance Goal 1, PT) long term goal (LTG);10 days  -AV               User Key  (r) = Recorded By, (t) = Taken By, (c) = Cosigned By      Initials Name Provider Type    Toribio Lang PT Physical Therapist                    Physical Therapy Education       Title: PT OT SLP Therapies (Done)        Topic: Physical Therapy (Done)       Point: Mobility training (Done)       Learning Progress Summary             Patient Acceptance, E,TB, VU by AV at 10/10/2023 1109                         Point: Body mechanics (Done)       Learning Progress Summary             Patient Acceptance, E,TB, VU by AV at 10/10/2023 1109                         Point: Precautions (Done)       Learning Progress Summary             Patient Acceptance, E,TB, VU by AV at 10/10/2023 1109                                         User Key       Initials Effective Dates Name Provider Type Discipline    AV 06/11/21 -  Toribio Brown, PT Physical Therapist PT                  PT Recommendation and Plan  Anticipated Discharge Disposition (PT): inpatient rehabilitation facility  Planned Therapy Interventions (PT): balance training, bed mobility training, gait training, stair training, strengthening, transfer training  Therapy Frequency (PT): daily  Plan of Care Reviewed With: patient, spouse  Outcome Evaluation: Pt presents in a deconditioned state secondary to recent hospital stay. He lacks functional endurance. He requires skilled PT services at this time to address these deficits so that he can safely return to a premorbid level of function.   Outcome Measures       Row Name 10/10/23 1100             How much help from another person do you currently need...    Turning from your back to your side while in flat bed without using bedrails? 3  -AV      Moving from lying on back to sitting on the side of a flat bed without bedrails? 3  -AV      Moving to and from a bed to a chair (including a wheelchair)? 3  -AV      Standing up from a chair using your arms (e.g., wheelchair, bedside chair)? 3  -AV      Climbing 3-5 steps with a railing? 2  -AV      To walk in hospital room? 3  -AV      AM-PAC 6 Clicks Score (PT) 17  -AV      Highest level of mobility 5 --> Static standing  -AV                User Key  (r) = Recorded By, (t) = Taken By, (c) =  Cosigned By      Initials Name Provider Type    AV Toribio Brown, PT Physical Therapist                     Time Calculation:    PT Charges       Row Name 10/10/23 1105             Time Calculation    PT Received On 10/10/23  -AV      PT Goal Re-Cert Due Date 10/19/23  -AV         Untimed Charges    PT Eval/Re-eval Minutes 25  -AV         Total Minutes    Untimed Charges Total Minutes 25  -AV       Total Minutes 25  -AV                User Key  (r) = Recorded By, (t) = Taken By, (c) = Cosigned By      Initials Name Provider Type    AV Toribio Brown, PT Physical Therapist                  Therapy Charges for Today       Code Description Service Date Service Provider Modifiers Qty    99494005345 HC PT EVAL LOW COMPLEXITY 2 10/10/2023 Toribio Brown, PT GP 1            PT G-Codes  AM-PAC 6 Clicks Score (PT): 17    Toribio Brown, PT  10/10/2023

## 2023-10-10 NOTE — PLAN OF CARE
Goal Outcome Evaluation:  Plan of Care Reviewed With: patient        Progress: no change  Outcome Evaluation: Vital signs stable. Unable to do colonoscopy today, not clear. NPO after midnight. Golytely started. See discharge care plans for further details.

## 2023-10-10 NOTE — PLAN OF CARE
Goal Outcome Evaluation:  Plan of Care Reviewed With: patient, spouse           Outcome Evaluation: Pt presents in a deconditioned state secondary to recent hospital stay. He lacks functional endurance. He requires skilled PT services at this time to address these deficits so that he can safely return to a premorbid level of function.      Anticipated Discharge Disposition (PT): inpatient rehabilitation facility

## 2023-10-10 NOTE — PROGRESS NOTES
[facility]     Progress Note    Patient Name: Selvin Ferguson  : 1935  MRN: [id number]  Primary Care Physician:  Kayla Renee APRN  Date of admission: 10/9/2023    Subjective   Subjective     Chief Complaint: Patient to get colonoscopy today sudden drop in the hemoglobin about 3 g he also has renal failure and possible myelodysplastic syndrome and hemoglobin has dropped in spite of being on growth factors    HPI: Bone marrow aspiration has been done in the office results are still pending colonoscopy done today    Review of Systems   All systems were reviewed and negative except for: Has been reviewed    Objective   Objective     Vitals:   Temp:  [97.9 øF (36.6 øC)-99.3 øF (37.4 øC)] 98.8 øF (37.1 øC)  Heart Rate:  [72-83] 76  Resp:  [18] 18  BP: (102-129)/(44-58) 116/50    Physical Exam    Constitutional: Awake, alert   Eyes: PERRLA, sclerae anicteric, no conjunctival injection   HENT: NCAT, mucous membranes moist   Neck: Supple, no thyromegaly, no lymphadenopathy, trachea midline   Respiratory: Clear to auscultation bilaterally, nonlabored respirations    Cardiovascular: RRR, no murmurs, rubs, or gallops, palpable pedal pulses bilaterally   Gastrointestinal: Positive bowel sounds, soft, nontender, nondistended   Musculoskeletal: No bilateral ankle edema, no clubbing or cyanosis to extremities   Psychiatric: Appropriate affect, cooperative   Neurologic: Oriented x 3, strength symmetric in all extremities, Cranial Nerves grossly intact to confrontation, speech clear   Skin: No rashes   No change  Result Review    Result Review:  I have personally reviewed the results from the time of this admission to 10/10/2023 07:46 EDT and agree with these findings:  [x]  Laboratory anemia  []  Microbiology  []  Radiology  []  EKG/Telemetry   []  Cardiology/Vascular   []  Pathology  []  Old records  []  Other:  Most notable findings include: Anemia with chronic kidney disease    Assessment & Plan   Assessment /  Plan     Brief Patient Summary:  Selvin Ferguson is a 87 y.o. male who anemia with chronic kidney disease and thrombocytopenia GI work-up in progress    Active Hospital Problems:  Active Hospital Problems    Diagnosis     **Anemia        Plan:   Continue observation today to get colonoscopy stable will discharge tomorrow    DVT prophylaxis:  Mechanical DVT prophylaxis orders are present.    CODE STATUS:   Level Of Support Discussed With: Patient  Code Status (Patient has no pulse and is not breathing): CPR (Attempt to Resuscitate)  Medical Interventions (Patient has pulse or is breathing): Full Support    Disposition:  I expect patient to be discharged if stable will discharge tomorrow.    Electronically signed by Tristin Reece MD, 10/10/23, 7:46 AM EDT.      Part of this note may be an electronic transcription/translation of spoken language to printed text using the Dragon Dictation System.

## 2023-10-10 NOTE — PLAN OF CARE
Problem: Adult Inpatient Plan of Care  Goal: Plan of Care Review  Outcome: Ongoing, Progressing  Flowsheets (Taken 10/10/2023 0550)  Progress: no change  Plan of Care Reviewed With:   patient   spouse  Goal: Patient-Specific Goal (Individualized)  Outcome: Ongoing, Progressing  Goal: Absence of Hospital-Acquired Illness or Injury  Outcome: Ongoing, Progressing  Intervention: Identify and Manage Fall Risk  Recent Flowsheet Documentation  Taken 10/10/2023 0514 by Genoveva Montalvo LPN  Safety Promotion/Fall Prevention: safety round/check completed  Taken 10/10/2023 0310 by Genoveva Montalvo LPN  Safety Promotion/Fall Prevention: safety round/check completed  Taken 10/10/2023 0112 by Genoveva Montalvo LPN  Safety Promotion/Fall Prevention: safety round/check completed  Taken 10/9/2023 2324 by Genoveva Montalvo LPN  Safety Promotion/Fall Prevention: safety round/check completed  Taken 10/9/2023 2106 by Genoveva Montalvo LPN  Safety Promotion/Fall Prevention: safety round/check completed  Taken 10/9/2023 1945 by Genoveva Montalvo LPN  Safety Promotion/Fall Prevention: safety round/check completed  Intervention: Prevent Skin Injury  Recent Flowsheet Documentation  Taken 10/10/2023 0144 by Genoveva Montalvo LPN  Skin Protection:   adhesive use limited   transparent dressing maintained   tubing/devices free from skin contact  Intervention: Prevent and Manage VTE (Venous Thromboembolism) Risk  Recent Flowsheet Documentation  Taken 10/10/2023 0144 by Genoveva Montalvo LPN  Range of Motion: active ROM (range of motion) encouraged  Intervention: Prevent Infection  Recent Flowsheet Documentation  Taken 10/10/2023 0144 by Genoveva Montalvo LPN  Infection Prevention:   equipment surfaces disinfected   hand hygiene promoted  Goal: Optimal Comfort and Wellbeing  Outcome: Ongoing, Progressing  Intervention: Provide Person-Centered Care  Recent Flowsheet Documentation  Taken 10/10/2023 0144 by Genoveva Montalvo LPN  Trust Relationship/Rapport:   care  explained   choices provided   questions answered   reassurance provided   thoughts/feelings acknowledged  Goal: Readiness for Transition of Care  Outcome: Ongoing, Progressing   Goal Outcome Evaluation:  Plan of Care Reviewed With: patient, spouse        Progress: no change       Vitals stable. Pt received second unit of PRBC this shift. Frequent reminders needed to drink Golytely for colonoscopy today. Wife at bedside. Will continue to monitor.

## 2023-10-11 LAB
BH BB BLOOD EXPIRATION DATE: NORMAL
BH BB BLOOD EXPIRATION DATE: NORMAL
BH BB BLOOD TYPE BARCODE: 5100
BH BB BLOOD TYPE BARCODE: 5100
BH BB DISPENSE STATUS: NORMAL
BH BB DISPENSE STATUS: NORMAL
BH BB PRODUCT CODE: NORMAL
BH BB PRODUCT CODE: NORMAL
BH BB UNIT NUMBER: NORMAL
BH BB UNIT NUMBER: NORMAL
CROSSMATCH INTERPRETATION: NORMAL
CROSSMATCH INTERPRETATION: NORMAL
UNIT  ABO: NORMAL
UNIT  ABO: NORMAL
UNIT  RH: NORMAL
UNIT  RH: NORMAL

## 2023-10-11 PROCEDURE — 63710000001 ROSUVASTATIN 20 MG TABLET: Performed by: INTERNAL MEDICINE

## 2023-10-11 PROCEDURE — 63710000001 SENNOSIDES-DOCUSATE 8.6-50 MG TABLET: Performed by: INTERNAL MEDICINE

## 2023-10-11 PROCEDURE — A9270 NON-COVERED ITEM OR SERVICE: HCPCS | Performed by: INTERNAL MEDICINE

## 2023-10-11 PROCEDURE — G0378 HOSPITAL OBSERVATION PER HR: HCPCS

## 2023-10-11 PROCEDURE — 97165 OT EVAL LOW COMPLEX 30 MIN: CPT

## 2023-10-11 PROCEDURE — 97110 THERAPEUTIC EXERCISES: CPT

## 2023-10-11 PROCEDURE — 63710000001 TAMSULOSIN 0.4 MG CAPSULE: Performed by: INTERNAL MEDICINE

## 2023-10-11 PROCEDURE — 63710000001 POLYETHYLENE GLYCOL 236 G RECONSTITUTED SOLUTION 4,000 ML BOTTLE: Performed by: INTERNAL MEDICINE

## 2023-10-11 PROCEDURE — 63710000001 LISINOPRIL 5 MG TABLET: Performed by: INTERNAL MEDICINE

## 2023-10-11 PROCEDURE — 63710000001 CETIRIZINE 10 MG TABLET: Performed by: INTERNAL MEDICINE

## 2023-10-11 PROCEDURE — 63710000001 SODIUM BICARBONATE 650 MG TABLET: Performed by: INTERNAL MEDICINE

## 2023-10-11 PROCEDURE — 63710000001 AMLODIPINE 5 MG TABLET: Performed by: INTERNAL MEDICINE

## 2023-10-11 PROCEDURE — 63710000001 METOPROLOL SUCCINATE XL 25 MG TABLET SUSTAINED-RELEASE 24 HOUR: Performed by: INTERNAL MEDICINE

## 2023-10-11 PROCEDURE — 63710000001 PANTOPRAZOLE 40 MG TABLET DELAYED-RELEASE: Performed by: INTERNAL MEDICINE

## 2023-10-11 PROCEDURE — 97530 THERAPEUTIC ACTIVITIES: CPT

## 2023-10-11 PROCEDURE — 63710000001 TRAZODONE 50 MG TABLET: Performed by: INTERNAL MEDICINE

## 2023-10-11 RX ADMIN — PANTOPRAZOLE SODIUM 40 MG: 40 TABLET, DELAYED RELEASE ORAL at 10:08

## 2023-10-11 RX ADMIN — TRAZODONE HYDROCHLORIDE 25 MG: 50 TABLET ORAL at 20:31

## 2023-10-11 RX ADMIN — AMLODIPINE BESYLATE 10 MG: 5 TABLET ORAL at 10:08

## 2023-10-11 RX ADMIN — TAMSULOSIN HYDROCHLORIDE 0.4 MG: 0.4 CAPSULE ORAL at 10:08

## 2023-10-11 RX ADMIN — ROSUVASTATIN 40 MG: 20 TABLET, FILM COATED ORAL at 20:31

## 2023-10-11 RX ADMIN — DOCUSATE SODIUM 50MG AND SENNOSIDES 8.6MG 2 TABLET: 8.6; 5 TABLET, FILM COATED ORAL at 20:31

## 2023-10-11 RX ADMIN — METOPROLOL SUCCINATE 25 MG: 25 TABLET, EXTENDED RELEASE ORAL at 10:08

## 2023-10-11 RX ADMIN — Medication 10 ML: at 20:31

## 2023-10-11 RX ADMIN — LISINOPRIL 5 MG: 5 TABLET ORAL at 10:08

## 2023-10-11 RX ADMIN — SODIUM BICARBONATE 650 MG TABLET 1300 MG: at 20:31

## 2023-10-11 RX ADMIN — POLYETHYLENE GLYCOL 3350, SODIUM SULFATE ANHYDROUS, SODIUM BICARBONATE, SODIUM CHLORIDE, POTASSIUM CHLORIDE 1000 ML: 236; 22.74; 6.74; 5.86; 2.97 POWDER, FOR SOLUTION ORAL at 10:08

## 2023-10-11 RX ADMIN — Medication 10 ML: at 10:09

## 2023-10-11 RX ADMIN — SODIUM BICARBONATE 650 MG TABLET 1300 MG: at 10:08

## 2023-10-11 RX ADMIN — PANTOPRAZOLE SODIUM 40 MG: 40 TABLET, DELAYED RELEASE ORAL at 20:31

## 2023-10-11 RX ADMIN — CETIRIZINE HYDROCHLORIDE 10 MG: 10 TABLET, FILM COATED ORAL at 10:08

## 2023-10-11 NOTE — THERAPY EVALUATION
Patient Name: Selvin Ferguson  : 1935    MRN: [id number]                              Today's Date: 10/11/2023       Admit Date: 10/9/2023    Visit Dx:     ICD-10-CM ICD-9-CM   1. 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 Difficulty walking  R26.2 719.7     Patient Active Problem List   Diagnosis    Acute renal failure superimposed on chronic kidney disease    Uremia    Urinary retention    Bilateral hydronephrosis    Type 2 diabetes mellitus    Symptomatic anemia    Essential hypertension    CAD (coronary artery disease)    History of dementia    Chronic kidney disease, stage IV (severe)    Abnormal metabolic state due to diabetes mellitus    Advanced dementia    BPH (benign prostatic hyperplasia)    Chronic obstructive pulmonary disease, unspecified    Gastroesophageal reflux disease    Hypertensive heart disease with heart failure    Neuropathy    Anemia     Past Medical History:   Diagnosis Date    Dementia     Diabetes mellitus     Hyperlipidemia     Hypertension     Renal disorder      Past Surgical History:   Procedure Laterality Date    CATARACT EXTRACTION      COLONOSCOPY      CORONARY ARTERY BYPASS GRAFT      CYSTOSCOPY      ENDOSCOPY N/A 2023    Procedure: ESOPHAGOGASTRODUODENOSCOPY WITH COLD BIOPSIES;  Surgeon: Renny Tong MD;  Location: [facility] ENDOSCOPY;  Service: Gastroenterology;  Laterality: N/A;  HIATAL HERNIA    PROSTATE SURGERY        General Information       Row Name 10/11/23 09          OT Time and Intention    Document Type evaluation  -PG     Mode of Treatment individual therapy  -PG       Row Name 10/11/23 0912          General Information    Patient Profile Reviewed yes  Patient lives with wife and reports independence with ADLs and functional mobility.  Patient drives  -PG     Prior Level of Function independent:;transfer;ADL's;driving  -PG     Existing Precautions/Restrictions fall  -PG     Barriers to Rehab none identified  -PG        Row Name 10/11/23 0912          Occupational Profile    Reason for Services/Referral (Occupational Profile) Patient is a pleasant 87-year-old male admitted for severe anemia with recent hospitalization for the same diagnosis.  No previous OT services identified.  Patient is being evaluated by Occupational Therapy due to recent decline in ADL function  -PG       Row Name 10/11/23 0912          Cognition    Orientation Status (Cognition) oriented x 3  -PG       Row Name 10/11/23 0912          Safety Issues, Functional Mobility    Impairments Affecting Function (Mobility) endurance/activity tolerance;strength;balance  -PG               User Key  (r) = Recorded By, (t) = Taken By, (c) = Cosigned By      Initials Name Provider Type    PG Lacho Ramos, OT Occupational Therapist                     Mobility/ADL's       Row Name 10/11/23 0913          Transfers    Transfers sit-stand transfer;stand-sit transfer  -       Row Name 10/11/23 0913          Sit-Stand Transfer    Sit-Stand [place] (Transfers) contact guard  -       Row Name 10/11/23 0913          Stand-Sit Transfer    Stand-Sit [place] (Transfers) contact guard  -PG       Row Name 10/11/23 0913          Activities of Daily Living    BADL Assessment/Intervention bathing;upper body dressing;lower body dressing;grooming;toileting  -PG       Row Name 10/11/23 0913          Bathing Assessment/Intervention    [place] Level (Bathing) bathing skills;minimum assist (75% patient effort)  -PG       Row Name 10/11/23 0913          Upper Body Dressing Assessment/Training    [place] Level (Upper Body Dressing) upper body dressing skills;set up  -PG       Row Name 10/11/23 0913          Lower Body Dressing Assessment/Training    [place] Level (Lower Body Dressing) lower body dressing skills;minimum assist (75% patient effort)  -       Row Name 10/11/23 0913          Grooming Assessment/Training    [place] Level (Grooming) grooming skills;set  up  -PG       Row Name 10/11/23 0913          Toileting Assessment/Training    [place] Level (Toileting) toileting skills;contact guard assist  -PG               User Key  (r) = Recorded By, (t) = Taken By, (c) = Cosigned By      Initials Name Provider Type    PG Lacho Ramos OT Occupational Therapist                   Obj/Interventions       Row Name 10/11/23 0914          Sensory Assessment (Somatosensory)    Sensory Assessment (Somatosensory) sensation intact  -PG       Row Name 10/11/23 0914          Vision Assessment/Intervention    Visual Impairment/Limitations WFL  -PG       Row Name 10/11/23 0914          Range of Motion Comprehensive    General Range of Motion no range of motion deficits identified  -PG       Row Name 10/11/23 0914          Strength Comprehensive (MMT)    General Manual Muscle Testing (MMT) Assessment no strength deficits identified  -PG       Row Name 10/11/23 0914          Motor Skills    Motor Skills coordination;functional endurance  -PG     Coordination WFL  -PG     Functional Endurance Fair  -PG               User Key  (r) = Recorded By, (t) = Taken By, (c) = Cosigned By      Initials Name Provider Type    PG Lacho Ramos OT Occupational Therapist                   Goals/Plan       Row Name 10/11/23 0916          Transfer Goal 1 (OT)    Activity/Assistive Device (Transfer Goal 1, OT) transfers, all  -PG     [place] Level/Cues Needed (Transfer Goal 1, OT) modified independence  -PG     Time Frame (Transfer Goal 1, OT) long term goal (LTG);10 days  -PG       Row Name 10/11/23 0916          Bathing Goal 1 (OT)    Activity/Device (Bathing Goal 1, OT) bathing skills, all  -PG     [place] Level/Cues Needed (Bathing Goal 1, OT) modified independence  -PG     Time Frame (Bathing Goal 1, OT) long term goal (LTG);10 days  -PG       Row Name 10/11/23 0916          Dressing Goal 1 (OT)    Activity/Device (Dressing Goal 1, OT) dressing skills, all  -PG     [place]/Cues  Needed (Dressing Goal 1, OT) modified independence  -PG     Time Frame (Dressing Goal 1, OT) long term goal (LTG);10 days  -PG       Row Name 10/11/23 0916          Toileting Goal 1 (OT)    Activity/Device (Toileting Goal 1, OT) toileting skills, all  -PG     [place] Level/Cues Needed (Toileting Goal 1, OT) modified independence  -PG     Time Frame (Toileting Goal 1, OT) long term goal (LTG);10 days  -PG       Row Name 10/11/23 0916          Grooming Goal 1 (OT)    Activity/Device (Grooming Goal 1, OT) grooming skills, all  -PG     [place] (Grooming Goal 1, OT) modified independence  -PG     Time Frame (Grooming Goal 1, OT) long term goal (LTG);10 days  -PG       Row Name 10/11/23 0916          Problem Specific Goal 1 (OT)    Problem Specific Goal 1 (OT) Patient will improve activity tolerance to good minus to support independence and engagement with ADL activities  -PG     Time Frame (Problem Specific Goal 1, OT) long term goal (LTG);10 days  -PG       Row Name 10/11/23 0916          Therapy Assessment/Plan (OT)    Planned Therapy Interventions (OT) activity tolerance training;BADL retraining;strengthening exercise;transfer/mobility retraining;patient/caregiver education/training;occupation/activity based interventions  -PG               User Key  (r) = Recorded By, (t) = Taken By, (c) = Cosigned By      Initials Name Provider Type    PG Lacho Ramos, OT Occupational Therapist                   Clinical Impression       Row Name 10/11/23 0915          Pain Assessment    Pretreatment Pain Rating 0/10 - no pain  -PG     Posttreatment Pain Rating 0/10 - no pain  -PG       Row Name 10/11/23 0915          Plan of Care Review    Plan of Care Reviewed With patient  -PG     Progress no change  -PG     Outcome Evaluation Patient presents with limitations affecting strength, activity tolerance, and balance impacting patient's ability to return home safely and independently.  The skills of a therapist will be  required to safely and effectively implement the following treatment plan to restore maximal level of function  -PG       Row Name 10/11/23 0915          Therapy Assessment/Plan (OT)    Patient/Family Therapy Goal Statement (OT) Get stronger and return home independently  -PG     Rehab Potential (OT) good, to achieve stated therapy goals  -PG     Criteria for Skilled Therapeutic Interventions Met (OT) yes;meets criteria;skilled treatment is necessary  -PG     Therapy Frequency (OT) 5 times/wk  -PG       Row Name 10/11/23 0915          Therapy Plan Review/Discharge Plan (OT)    Anticipated Discharge Disposition (OT) home  -PG               User Key  (r) = Recorded By, (t) = Taken By, (c) = Cosigned By      Initials Name Provider Type    PG Lacho Ramos, ROSIE Occupational Therapist                   Outcome Measures       Row Name 10/11/23 0917          How much help from another is currently needed...    Putting on and taking off regular lower body clothing? 3  -PG     Bathing (including washing, rinsing, and drying) 3  -PG     Toileting (which includes using toilet bed pan or urinal) 3  -PG     Putting on and taking off regular upper body clothing 3  -PG     Taking care of personal grooming (such as brushing teeth) 4  -PG     Eating meals 4  -PG     AM-PAC 6 Clicks Score (OT) 20  -PG       Row Name 10/10/23 2126          How much help from another person do you currently need...    Turning from your back to your side while in flat bed without using bedrails? 3  -SH     Moving from lying on back to sitting on the side of a flat bed without bedrails? 3  -SH     Moving to and from a bed to a chair (including a wheelchair)? 3  -SH     Standing up from a chair using your arms (e.g., wheelchair, bedside chair)? 3  -SH     Climbing 3-5 steps with a railing? 2  -SH     To walk in hospital room? 3  -SH     AM-PAC 6 Clicks Score (PT) 17  -SH     Highest level of mobility 5 --> Static standing  -SH       Row Name 10/11/23 0917           Functional Assessment    Outcome Measure Options AM-PAC 6 Clicks Daily Activity (OT);Optimal Instrument  -PG       Row Name 10/11/23 0917          Optimal Instrument    Optimal Instrument Optimal - 3  -PG     Bending/Stooping 2  -PG     Standing 2  -PG     Reaching 1  -PG     From the list, choose the 3 activities you would most like to be able to do without any difficulty Bending/stooping;Standing;Reaching  -PG     Total Score Optimal - 3 5  -PG               User Key  (r) = Recorded By, (t) = Taken By, (c) = Cosigned By      Initials Name Provider Type    PG Lacho Ramos OT Occupational Therapist    Cherelle Etienne RN Registered Nurse                    Occupational Therapy Education       Title: PT OT SLP Therapies (In Progress)       Topic: Occupational Therapy (Not Started)       Point: ADL training (Not Started)       Description:   Instruct learner(s) on proper safety adaptation and remediation techniques during self care or transfers.   Instruct in proper use of assistive devices.                  Learner Progress:  Not documented in this visit.              Point: Home exercise program (Not Started)       Description:   Instruct learner(s) on appropriate technique for monitoring, assisting and/or progressing therapeutic exercises/activities.                  Learner Progress:  Not documented in this visit.              Point: Precautions (Not Started)       Description:   Instruct learner(s) on prescribed precautions during self-care and functional transfers.                  Learner Progress:  Not documented in this visit.              Point: Body mechanics (Not Started)       Description:   Instruct learner(s) on proper positioning and spine alignment during self-care, functional mobility activities and/or exercises.                  Learner Progress:  Not documented in this visit.                                  OT Recommendation and Plan  Planned Therapy Interventions (OT): activity  tolerance training, BADL retraining, strengthening exercise, transfer/mobility retraining, patient/caregiver education/training, occupation/activity based interventions  Therapy Frequency (OT): 5 times/wk  Plan of Care Review  Plan of Care Reviewed With: patient  Progress: no change  Outcome Evaluation: Patient presents with limitations affecting strength, activity tolerance, and balance impacting patient's ability to return home safely and independently.  The skills of a therapist will be required to safely and effectively implement the following treatment plan to restore maximal level of function     Time Calculation:   Evaluation Complexity (OT)  Review Occupational Profile/Medical/Therapy History Complexity: brief/low complexity  Assessment, Occupational Performance/Identification of Deficit Complexity: 3-5 performance deficits  Clinical Decision Making Complexity (OT): problem focused assessment/low complexity  Overall Complexity of Evaluation (OT): low complexity     Time Calculation- OT       Row Name 10/11/23 0919             Time Calculation- OT    OT Received On 10/11/23  -PG      OT Goal Re-Cert Due Date 10/20/23  -PG         Untimed Charges    OT Eval/Re-eval Minutes 35  -PG         Total Minutes    Untimed Charges Total Minutes 35  -PG       Total Minutes 35  -PG                User Key  (r) = Recorded By, (t) = Taken By, (c) = Cosigned By      Initials Name Provider Type    PG Lacho Ramos OT Occupational Therapist                  Therapy Charges for Today       Code Description Service Date Service Provider Modifiers Qty    [id number]  OT EVAL LOW COMPLEXITY 3 10/11/2023 Lacho Ramos OT GO 1                 Lacho Ramos OT  10/11/2023

## 2023-10-11 NOTE — PLAN OF CARE
Goal Outcome Evaluation:              Outcome Evaluation: VSS.  PT NPO at midnight for possible colonoscopy today.  Still not clear as of this morning.  Still drinking golytely.  No complaints of pain or discomfort.  Call light within reach. Continue plan of care.

## 2023-10-11 NOTE — PLAN OF CARE
Problem: Adult Inpatient Plan of Care  Goal: Plan of Care Review  Outcome: Ongoing, Progressing  Flowsheets (Taken 10/11/2023 1538)  Progress: no change  Plan of Care Reviewed With: patient  Outcome Evaluation: VSS. Patient is scheduled for a Colonoscopy tomorrow. Still encouraging patient on the importance of finishing his GoLYTELY. No complaints of pain or discomfort. Will continue plan of care.  Goal: Patient-Specific Goal (Individualized)  Outcome: Ongoing, Progressing  Goal: Absence of Hospital-Acquired Illness or Injury  Outcome: Ongoing, Progressing  Intervention: Identify and Manage Fall Risk  Recent Flowsheet Documentation  Taken 10/11/2023 1528 by Marla Venegas RN  Safety Promotion/Fall Prevention: safety round/check completed  Taken 10/11/2023 1319 by Marla Venegas RN  Safety Promotion/Fall Prevention: safety round/check completed  Taken 10/11/2023 1208 by Marla Venegas RN  Safety Promotion/Fall Prevention: safety round/check completed  Taken 10/11/2023 1008 by Marla Venegas RN  Safety Promotion/Fall Prevention: safety round/check completed  Taken 10/11/2023 0915 by Marla Venegas RN  Safety Promotion/Fall Prevention: safety round/check completed  Taken 10/11/2023 0825 by Marla Venegas RN  Safety Promotion/Fall Prevention: safety round/check completed  Taken 10/11/2023 0712 by Marla Venegas RN  Safety Promotion/Fall Prevention: safety round/check completed  Goal: Optimal Comfort and Wellbeing  Outcome: Ongoing, Progressing  Goal: Readiness for Transition of Care  Outcome: Ongoing, Progressing     Problem: Fall Injury Risk  Goal: Absence of Fall and Fall-Related Injury  Outcome: Ongoing, Progressing  Intervention: Promote Injury-Free Environment  Recent Flowsheet Documentation  Taken 10/11/2023 1528 by Marla Venegas RN  Safety Promotion/Fall Prevention: safety round/check completed  Taken 10/11/2023 1319 by Marla Venegas RN  Safety Promotion/Fall Prevention: safety round/check  completed  Taken 10/11/2023 1208 by Marla Venegas RN  Safety Promotion/Fall Prevention: safety round/check completed  Taken 10/11/2023 1008 by Marla Venegas RN  Safety Promotion/Fall Prevention: safety round/check completed  Taken 10/11/2023 0915 by Marla Venegas RN  Safety Promotion/Fall Prevention: safety round/check completed  Taken 10/11/2023 0825 by Marla Venegas RN  Safety Promotion/Fall Prevention: safety round/check completed  Taken 10/11/2023 0712 by Marla Venegas RN  Safety Promotion/Fall Prevention: safety round/check completed     Problem: Anemia  Goal: Anemia Symptom Improvement  Outcome: Ongoing, Progressing  Intervention: Monitor and Manage Anemia  Recent Flowsheet Documentation  Taken 10/11/2023 1528 by Marla Venegas RN  Safety Promotion/Fall Prevention: safety round/check completed  Taken 10/11/2023 1319 by Marla Venegas RN  Safety Promotion/Fall Prevention: safety round/check completed  Taken 10/11/2023 1208 by Marla Venegas RN  Safety Promotion/Fall Prevention: safety round/check completed  Taken 10/11/2023 1008 by Marla Venegas RN  Safety Promotion/Fall Prevention: safety round/check completed  Taken 10/11/2023 0915 by Marla Venegas RN  Safety Promotion/Fall Prevention: safety round/check completed  Taken 10/11/2023 0825 by Marla Venegas RN  Safety Promotion/Fall Prevention: safety round/check completed  Taken 10/11/2023 0712 by Marla Venegas RN  Safety Promotion/Fall Prevention: safety round/check completed     Problem: Skin Injury Risk Increased  Goal: Skin Health and Integrity  Outcome: Ongoing, Progressing   Goal Outcome Evaluation:  Plan of Care Reviewed With: patient        Progress: no change  Outcome Evaluation: VSS. Patient is scheduled for a Colonoscopy tomorrow. Still encouraging patient on the importance of finishing his GoLYTELY. No complaints of pain or discomfort. Will continue plan of care.

## 2023-10-11 NOTE — PROGRESS NOTES
[facility]     Progress Note    Patient Name: Selvin Ferguson  : 1935  MRN: [id number]  Primary Care Physician:  Kayla Renee APRN  Date of admission: 10/9/2023    Subjective   Subjective     Chief Complaint: Patient stable and doing well however he has not been able to finish GoLytely we are waiting for that he is also due for getting his darbepoetin injections which his wife will bring probably will be a candidate for rehab placement    HPI: Patient admitted with severe anemia probably because of anemia of chronic disease cannot rule out possibility of MDS bone marrow aspiration and biopsy has been done as an outpatient results are still pending    Review of Systems   All systems were reviewed and negative except for: Reviewed    Objective   Objective     Vitals:   Temp:  [98.2 øF (36.8 øC)-99 øF (37.2 øC)] 98.6 øF (37 øC)  Heart Rate:  [77-81] 81  Resp:  [18-20] 20  BP: (117-127)/(47-59) 120/59    Physical Exam    Constitutional: Awake, alert   Eyes: PERRLA, sclerae anicteric, no conjunctival injection   HENT: NCAT, mucous membranes moist   Neck: Supple, no thyromegaly, no lymphadenopathy, trachea midline   Respiratory: Clear to auscultation bilaterally, nonlabored respirations    Cardiovascular: RRR, no murmurs, rubs, or gallops, palpable pedal pulses bilaterally   Gastrointestinal: Positive bowel sounds, soft, nontender, nondistended   Musculoskeletal: No bilateral ankle edema, no clubbing or cyanosis to extremities   Psychiatric: Appropriate affect, cooperative   Neurologic: Oriented x 3, strength symmetric in all extremities, Cranial Nerves grossly intact to confrontation, speech clear   Skin: No rashes   No change  Result Review    Result Review:  I have personally reviewed the results from the time of this admission to 10/11/2023 07:58 EDT and agree with these findings:  [x]  Laboratory anemia probably multifactorial including chronic disease renal failure and possible GI blood loss  []   Microbiology  []  Radiology  []  EKG/Telemetry   []  Cardiology/Vascular   []  Pathology  []  Old records  []  Other:  Most notable findings include: Reviewed    Assessment & Plan   Assessment / Plan     Brief Patient Summary:  Selvin Ferguson is a 87 y.o. male who patient getting GoLytely preparing for colonoscopy and then may have to wait for rehab bed    Active Hospital Problems:  Active Hospital Problems    Diagnosis     **Anemia        Plan:   Continue current management    DVT prophylaxis:  Mechanical DVT prophylaxis orders are present.    CODE STATUS:   Level Of Support Discussed With: Patient  Code Status (Patient has no pulse and is not breathing): CPR (Attempt to Resuscitate)  Medical Interventions (Patient has pulse or is breathing): Full Support    Disposition:  I expect patient to be discharged after the patient has been stabilized.    Electronically signed by Tristin Reece MD, 10/11/23, 7:58 AM EDT.      Part of this note may be an electronic transcription/translation of spoken language to printed text using the Dragon Dictation System.

## 2023-10-11 NOTE — PLAN OF CARE
Goal Outcome Evaluation:  Plan of Care Reviewed With: patient        Progress: no change  Outcome Evaluation: Patient presents with limitations affecting strength, activity tolerance, and balance impacting patient's ability to return home safely and independently.  The skills of a therapist will be required to safely and effectively implement the following treatment plan to restore maximal level of function      Anticipated Discharge Disposition (OT): home

## 2023-10-11 NOTE — THERAPY TREATMENT NOTE
Acute Care - Physical Therapy Treatment Note   [facility]     Patient Name: Selvin Ferguson  : 1935  MRN: [id number]  Today's Date: 10/11/2023      Visit Dx:     ICD-10-CM ICD-9-CM   1. 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 Difficulty walking  R26.2 719.7     Patient Active Problem List   Diagnosis    Acute renal failure superimposed on chronic kidney disease    Uremia    Urinary retention    Bilateral hydronephrosis    Type 2 diabetes mellitus    Symptomatic anemia    Essential hypertension    CAD (coronary artery disease)    History of dementia    Chronic kidney disease, stage IV (severe)    Abnormal metabolic state due to diabetes mellitus    Advanced dementia    BPH (benign prostatic hyperplasia)    Chronic obstructive pulmonary disease, unspecified    Gastroesophageal reflux disease    Hypertensive heart disease with heart failure    Neuropathy    Anemia     Past Medical History:   Diagnosis Date    Dementia     Diabetes mellitus     Hyperlipidemia     Hypertension     Renal disorder      Past Surgical History:   Procedure Laterality Date    CATARACT EXTRACTION      COLONOSCOPY      CORONARY ARTERY BYPASS GRAFT      CYSTOSCOPY      ENDOSCOPY N/A 2023    Procedure: ESOPHAGOGASTRODUODENOSCOPY WITH COLD BIOPSIES;  Surgeon: Renny Tong MD;  Location: [facility] ENDOSCOPY;  Service: Gastroenterology;  Laterality: N/A;  HIATAL HERNIA    PROSTATE SURGERY       PT Assessment (last 12 hours)       PT Evaluation and Treatment       Row Name 10/11/23 1344          Physical Therapy Time and Intention    Subjective Information complains of;weakness  -DK     Document Type therapy note (daily note)  -DK     Mode of Treatment individual therapy;physical therapy  -DK     Patient Effort good  -DK     Symptoms Noted During/After Treatment none  -DK     Comment Pt was rather annoyed about having to drink the Golytely, wanting water.  RN was advised.  Pt was able to participate in  exercises, transfers and gait this session.  -DK       Row Name 10/11/23 1344          Pain    Pretreatment Pain Rating 0/10 - no pain  -DK     Posttreatment Pain Rating 0/10 - no pain  -DK       Row Name 10/11/23 1344          Cognition    Affect/Mental Status (Cognition) confused;agitated  -DK     Behavioral Issues (Cognition) overwhelmed easily  -DK     Orientation Status (Cognition) oriented to;person  -DK     Follows Commands (Cognition) WFL  -DK     Cognitive Function WFL  -DK     Personal Safety Interventions gait belt;nonskid shoes/slippers when out of bed;supervised activity  -DK       Row Name 10/11/23 1344          Bed Mobility    Bed Mobility supine-sit-supine  -DK     All Activities, [place] (Bed Mobility) standby assist  -DK     Supine-Sit-Supine [place] (Bed Mobility) standby assist  -DK     Assistive Device (Bed Mobility) bed rails  -       Row Name 10/11/23 1344          Transfers    Transfers sit-stand transfer;stand-sit transfer  -       Row Name 10/11/23 1344          Sit-Stand Transfer    Sit-Stand [place] (Transfers) standby assist;contact guard;1 person assist  -DK     Assistive Device (Sit-Stand Transfers) walker, front-wheeled  -DK       Row Name 10/11/23 1344          Stand-Sit Transfer    Stand-Sit [place] (Transfers) standby assist;contact guard;1 person assist  -DK     Assistive Device (Stand-Sit Transfers) walker, front-wheeled  -DK       Row Name 10/11/23 1345          Gait/Stairs (Locomotion)    Gait/Stairs Locomotion gait/ambulation independence;gait/ambulation assistive device;distance ambulated;gait pattern  -DK     [place] Level (Gait) standby assist;contact guard;1 person assist  -DK     Assistive Device (Gait) walker, front-wheeled  -DK     Distance in Feet (Gait) 60  -DK     Pattern (Gait) step-through  -DK     Deviations/Abnormal Patterns (Gait) festinating/shuffling  -DK     Bilateral Gait Deviations forward flexed posture  -DK     Comment,  (Gait/Stairs) Pt ambulated on room air with a rolling walker.  He returned to bed on alert post treatment.  -       Row Name 10/11/23 1344          Safety Issues, Functional Mobility    Safety Issues Affecting Function (Mobility) ability to follow commands;awareness of need for assistance;impulsivity;judgment;safety precaution awareness  -     Impairments Affecting Function (Mobility) balance;endurance/activity tolerance;strength  -       Row Name 10/11/23 134          Balance    Balance Assessment sitting static balance;sitting dynamic balance;standing static balance;standing dynamic balance  -     Static Sitting Balance standby assist  -DK     Dynamic Sitting Balance standby assist  -DK     Position, Sitting Balance unsupported;sitting edge of bed  -     Static Standing Balance standby assist;contact guard;1-person assist  -DK     Dynamic Standing Balance standby assist;contact guard;1-person assist  -DK     Position/Device Used, Standing Balance walker, front-wheeled  -     Balance Interventions standing;dynamic;tandem gait  -       Row Name 10/11/23 1341          Motor Skills    Motor Skills --  therapeutic exercises  -     Coordination WFL  -     Therapeutic Exercise hip;knee;ankle  -       Row Name 10/11/23 1344          Hip (Therapeutic Exercise)    Hip (Therapeutic Exercise) AAROM (active assistive range of motion)  -     Hip AAROM (Therapeutic Exercise) bilateral;flexion;extension;aBduction;aDduction;supine;10 repetitions;2 sets  -       Row Name 10/11/23 1348          Knee (Therapeutic Exercise)    Knee (Therapeutic Exercise) AAROM (active assistive range of motion)  -     Knee AAROM (Therapeutic Exercise) bilateral;flexion;extension;supine;10 repetitions;2 sets  -       Row Name 10/11/23 1347          Ankle (Therapeutic Exercise)    Ankle (Therapeutic Exercise) AAROM (active assistive range of motion)  -     Ankle AAROM (Therapeutic Exercise)  bilateral;dorsiflexion;plantarflexion;supine;10 repetitions;2 sets  -       Row Name 10/11/23 1349          Plan of Care Review    Plan of Care Reviewed With patient;family  -     Progress improving  -       Row Name 10/11/23 1341          Positioning and Restraints    Pre-Treatment Position in bed  -DK     Post Treatment Position bed  -DK     In Bed supine;call light within reach;encouraged to call for assist;exit alarm on;with family/caregiver;side rails up x2;legs elevated  -       Row Name 10/11/23 1346          Therapy Assessment/Plan (PT)    Rehab Potential (PT) good, to achieve stated therapy goals  -     Criteria for Skilled Interventions Met (PT) skilled treatment is necessary  -     Therapy Frequency (PT) daily  -     Problem List (PT) problems related to;strength;hearing  -     Activity Limitations Related to Problem List (PT) unable to ambulate safely;unable to transfer safely  -       Row Name 10/11/23 1342          Progress Summary (PT)    Progress Toward Functional Goals (PT) progress toward functional goals is good  -               User Key  (r) = Recorded By, (t) = Taken By, (c) = Cosigned By      Initials Name Provider Type    Nguyen Sanchez PTA Physical Therapist Assistant                    Physical Therapy Education       Title: PT OT SLP Therapies (In Progress)       Topic: Physical Therapy (Done)       Point: Mobility training (Done)       Learning Progress Summary             Patient Acceptance, E,TB, VU by  at 10/10/2023 1109                         Point: Body mechanics (Done)       Learning Progress Summary             Patient Acceptance, E,TB, VU by AV at 10/10/2023 1109                         Point: Precautions (Done)       Learning Progress Summary             Patient Acceptance, E,TB, VU by  at 10/10/2023 1109                                         User Key       Initials Effective Dates Name Provider Type Discipline     06/11/21 -  Toribio Brown, PT  Physical Therapist PT                  PT Recommendation and Plan  Planned Therapy Interventions (PT): balance training, bed mobility training, gait training, strengthening, transfer training  Therapy Frequency (PT): daily  Progress Summary (PT)  Progress Toward Functional Goals (PT): progress toward functional goals is good  Plan of Care Reviewed With: patient, family  Progress: improving   Outcome Measures       Row Name 10/11/23 1344 10/10/23 1100          How much help from another person do you currently need...    Turning from your back to your side while in flat bed without using bedrails? 4  -DK 3  -AV     Moving from lying on back to sitting on the side of a flat bed without bedrails? 4  -DK 3  -AV     Moving to and from a bed to a chair (including a wheelchair)? 3  -DK 3  -AV     Standing up from a chair using your arms (e.g., wheelchair, bedside chair)? 3  -DK 3  -AV     Climbing 3-5 steps with a railing? 3  -DK 2  -AV     To walk in hospital room? 3  -DK 3  -AV     AM-PAC 6 Clicks Score (PT) 20  -DK 17  -AV     Highest level of mobility 6 --> Walked 10 steps or more  -DK 5 --> Static standing  -AV        Functional Assessment    Outcome Measure Options AM-PAC 6 Clicks Basic Mobility (PT)  -DK --               User Key  (r) = Recorded By, (t) = Taken By, (c) = Cosigned By      Initials Name Provider Type    Nguyen Sanchez, PTA Physical Therapist Assistant    Toribio Lang, PT Physical Therapist                     Time Calculation:    PT Charges       Row Name 10/11/23 1350             Time Calculation    PT Received On 10/11/23  -DK      PT Goal Re-Cert Due Date 10/19/23  -DK         Timed Charges    58919 - PT Therapeutic Exercise Minutes 14  -DK      79165 - Gait Training Minutes  5  -DK      62867 - PT Therapeutic Activity Minutes 9  -DK         Total Minutes    Timed Charges Total Minutes 28  -DK       Total Minutes 28  -DK                User Key  (r) = Recorded By, (t) = Taken By, (c) =  Cosigned By      Initials Name Provider Type    Nguyen Sanchez PTA Physical Therapist Assistant                  Therapy Charges for Today       Code Description Service Date Service Provider Modifiers Qty    13082253843 HC PT THER PROC EA 15 MIN 10/11/2023 Nguyen Reyes PTA GP 1    04764244135 HC PT THERAPEUTIC ACT EA 15 MIN 10/11/2023 Nguyen Reyes PTA GP 1            PT G-Codes  Outcome Measure Options: AM-PAC 6 Clicks Basic Mobility (PT)  AM-PAC 6 Clicks Score (PT): 20  AM-PAC 6 Clicks Score (OT): 20    Nguyen Reyes PTA  10/11/2023

## 2023-10-12 LAB — GLUCOSE BLDC GLUCOMTR-MCNC: 92 MG/DL (ref 70–99)

## 2023-10-12 PROCEDURE — A9270 NON-COVERED ITEM OR SERVICE: HCPCS | Performed by: INTERNAL MEDICINE

## 2023-10-12 PROCEDURE — 63710000001 CETIRIZINE 10 MG TABLET: Performed by: INTERNAL MEDICINE

## 2023-10-12 PROCEDURE — 25810000003 SODIUM CHLORIDE 0.9 % SOLUTION

## 2023-10-12 PROCEDURE — 25810000003 SODIUM CHLORIDE 0.9 % SOLUTION: Performed by: INTERNAL MEDICINE

## 2023-10-12 PROCEDURE — 25810000003 LACTATED RINGERS PER 1000 ML: Performed by: MARRIAGE & FAMILY THERAPIST

## 2023-10-12 PROCEDURE — 63710000001 TAMSULOSIN 0.4 MG CAPSULE: Performed by: INTERNAL MEDICINE

## 2023-10-12 PROCEDURE — 63710000001 TRAZODONE 50 MG TABLET: Performed by: INTERNAL MEDICINE

## 2023-10-12 PROCEDURE — 82948 REAGENT STRIP/BLOOD GLUCOSE: CPT

## 2023-10-12 PROCEDURE — 25010000002 PROPOFOL 10 MG/ML EMULSION: Performed by: MARRIAGE & FAMILY THERAPIST

## 2023-10-12 PROCEDURE — 45378 DIAGNOSTIC COLONOSCOPY: CPT | Performed by: INTERNAL MEDICINE

## 2023-10-12 PROCEDURE — 63710000001 PANTOPRAZOLE 40 MG TABLET DELAYED-RELEASE: Performed by: INTERNAL MEDICINE

## 2023-10-12 PROCEDURE — 63710000001 LISINOPRIL 5 MG TABLET: Performed by: INTERNAL MEDICINE

## 2023-10-12 PROCEDURE — G0378 HOSPITAL OBSERVATION PER HR: HCPCS

## 2023-10-12 PROCEDURE — 63710000001 ROSUVASTATIN 20 MG TABLET: Performed by: INTERNAL MEDICINE

## 2023-10-12 PROCEDURE — 63710000001 SODIUM BICARBONATE 650 MG TABLET: Performed by: INTERNAL MEDICINE

## 2023-10-12 PROCEDURE — 63710000001 AMLODIPINE 5 MG TABLET: Performed by: INTERNAL MEDICINE

## 2023-10-12 PROCEDURE — 63710000001 METOPROLOL SUCCINATE XL 25 MG TABLET SUSTAINED-RELEASE 24 HOUR: Performed by: INTERNAL MEDICINE

## 2023-10-12 RX ORDER — IPRATROPIUM BROMIDE AND ALBUTEROL SULFATE 2.5; .5 MG/3ML; MG/3ML
3 SOLUTION RESPIRATORY (INHALATION) ONCE
Status: COMPLETED | OUTPATIENT
Start: 2023-10-12 | End: 2023-10-12

## 2023-10-12 RX ORDER — PHENYLEPHRINE HCL IN 0.9% NACL 1 MG/10 ML
SYRINGE (ML) INTRAVENOUS AS NEEDED
Status: DISCONTINUED | OUTPATIENT
Start: 2023-10-12 | End: 2023-10-12 | Stop reason: SURG

## 2023-10-12 RX ORDER — LIDOCAINE HYDROCHLORIDE 20 MG/ML
INJECTION, SOLUTION EPIDURAL; INFILTRATION; INTRACAUDAL; PERINEURAL AS NEEDED
Status: DISCONTINUED | OUTPATIENT
Start: 2023-10-12 | End: 2023-10-12 | Stop reason: SURG

## 2023-10-12 RX ORDER — SODIUM CHLORIDE 9 MG/ML
9 INJECTION, SOLUTION INTRAVENOUS CONTINUOUS
Status: DISCONTINUED | OUTPATIENT
Start: 2023-10-12 | End: 2023-10-14 | Stop reason: HOSPADM

## 2023-10-12 RX ORDER — SODIUM CHLORIDE, SODIUM LACTATE, POTASSIUM CHLORIDE, CALCIUM CHLORIDE 600; 310; 30; 20 MG/100ML; MG/100ML; MG/100ML; MG/100ML
INJECTION, SOLUTION INTRAVENOUS CONTINUOUS PRN
Status: DISCONTINUED | OUTPATIENT
Start: 2023-10-12 | End: 2023-10-12 | Stop reason: SURG

## 2023-10-12 RX ADMIN — Medication 10 ML: at 21:55

## 2023-10-12 RX ADMIN — AMLODIPINE BESYLATE 10 MG: 5 TABLET ORAL at 17:43

## 2023-10-12 RX ADMIN — Medication 100 MCG: at 16:02

## 2023-10-12 RX ADMIN — PROPOFOL 155 MCG/KG/MIN: 10 INJECTION, EMULSION INTRAVENOUS at 15:43

## 2023-10-12 RX ADMIN — SODIUM BICARBONATE 650 MG TABLET 1300 MG: at 21:53

## 2023-10-12 RX ADMIN — LISINOPRIL 5 MG: 5 TABLET ORAL at 17:43

## 2023-10-12 RX ADMIN — METOPROLOL SUCCINATE 25 MG: 25 TABLET, EXTENDED RELEASE ORAL at 08:46

## 2023-10-12 RX ADMIN — PANTOPRAZOLE SODIUM 40 MG: 40 TABLET, DELAYED RELEASE ORAL at 21:53

## 2023-10-12 RX ADMIN — SODIUM CHLORIDE, POTASSIUM CHLORIDE, SODIUM LACTATE AND CALCIUM CHLORIDE: 600; 310; 30; 20 INJECTION, SOLUTION INTRAVENOUS at 15:38

## 2023-10-12 RX ADMIN — Medication 200 MCG: at 16:17

## 2023-10-12 RX ADMIN — IPRATROPIUM BROMIDE AND ALBUTEROL SULFATE 3 ML: .5; 3 SOLUTION RESPIRATORY (INHALATION) at 14:45

## 2023-10-12 RX ADMIN — SODIUM CHLORIDE 9 ML: 9 INJECTION, SOLUTION INTRAVENOUS at 14:17

## 2023-10-12 RX ADMIN — TAMSULOSIN HYDROCHLORIDE 0.4 MG: 0.4 CAPSULE ORAL at 17:43

## 2023-10-12 RX ADMIN — CETIRIZINE HYDROCHLORIDE 10 MG: 10 TABLET, FILM COATED ORAL at 17:44

## 2023-10-12 RX ADMIN — Medication 10 ML: at 10:21

## 2023-10-12 RX ADMIN — TRAZODONE HYDROCHLORIDE 25 MG: 50 TABLET ORAL at 21:53

## 2023-10-12 RX ADMIN — ROSUVASTATIN 40 MG: 20 TABLET, FILM COATED ORAL at 21:53

## 2023-10-12 RX ADMIN — LIDOCAINE HYDROCHLORIDE 100 MG: 20 INJECTION, SOLUTION EPIDURAL; INFILTRATION; INTRACAUDAL; PERINEURAL at 15:43

## 2023-10-12 NOTE — PERIOPERATIVE NURSING NOTE
Pt is not alert and oriented, RN unable to effectively gather pre op information from patient such as medication, medical history, NPO status and status of last BM. Pt has no family at bedside during this time to help answer pre op questions

## 2023-10-12 NOTE — PLAN OF CARE
Goal Outcome Evaluation:              Outcome Evaluation: VSS.  Pt scheduled for colonsocopy today.  NPO at midnight, but drinking go lightly.  Pt very hesitant to drink go lightly, but encouraged throughout the shift to drink it.  Stools are light brown in color.  No complaints of pain or discomfort.  Continue plan of care.

## 2023-10-12 NOTE — PROGRESS NOTES
[facility]     Progress Note    Patient Name: Selvin Ferguson  : 1935  MRN: [id number]  Primary Care Physician:  Kayla Renee APRN  Date of admission: 10/9/2023    Subjective   Subjective     Chief Complaint: Patient stable and doing well is going to get a colonoscopy today he is waiting for the rehab placement    HPI: Stable and doing well waiting for rehab placement    Review of Systems   All systems were reviewed and negative except for: Reviewed    Objective   Objective     Vitals:   Temp:  [98 øF (36.7 øC)-98.9 øF (37.2 øC)] 98 øF (36.7 øC)  Heart Rate:  [77-90] 85  Resp:  [16-20] 16  BP: (118-138)/(51-60) 131/53    Physical Exam    Constitutional: Awake, alert   Eyes: PERRLA, sclerae anicteric, no conjunctival injection   HENT: NCAT, mucous membranes moist   Neck: Supple, no thyromegaly, no lymphadenopathy, trachea midline   Respiratory: Clear to auscultation bilaterally, nonlabored respirations    Cardiovascular: RRR, no murmurs, rubs, or gallops, palpable pedal pulses bilaterally   Gastrointestinal: Positive bowel sounds, soft, nontender, nondistended   Musculoskeletal: No bilateral ankle edema, no clubbing or cyanosis to extremities   Psychiatric: Appropriate affect, cooperative   Neurologic: Oriented x 3, strength symmetric in all extremities, Cranial Nerves grossly intact to confrontation, speech clear   Skin: No rashes   No change  Result Review    Result Review:  I have personally reviewed the results from the time of this admission to 10/12/2023 07:51 EDT and agree with these findings:  [x]  Laboratory anemia  []  Microbiology  []  Radiology  []  EKG/Telemetry   []  Cardiology/Vascular   []  Pathology  []  Old records  []  Other:  Most notable findings include: Anemia with chronic kidney disease and 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 male who patient with sudden drop in the hemoglobin trying to see if he  has any bleeding area but has not been able to get his bowels clean refuses to drink GoLytely    Active Hospital Problems:  Active Hospital Problems    Diagnosis     **Anemia        Plan:   Colonoscopy to be done today    DVT prophylaxis:  Mechanical DVT prophylaxis orders are present.    CODE STATUS:   Level Of Support Discussed With: Patient  Code Status (Patient has no pulse and is not breathing): CPR (Attempt to Resuscitate)  Medical Interventions (Patient has pulse or is breathing): Full Support    Disposition:  I expect patient to be discharged after the patient has been stabilized waiting for the rehab placement.    Electronically signed by Tristin Reece MD, 10/12/23, 7:51 AM EDT.      Part of this note may be an electronic transcription/translation of spoken language to printed text using the Dragon Dictation System.

## 2023-10-12 NOTE — ANESTHESIA POSTPROCEDURE EVALUATION
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        Post Anesthesia Care and Evaluation    Patient location during evaluation: bedside  Patient participation: complete - patient participated  Level of consciousness: awake  Pain management: adequate    Airway patency: patent  Anesthetic complications: No anesthetic complications  PONV Status: controlled  Cardiovascular status: acceptable and stable  Respiratory status: acceptable

## 2023-10-13 LAB
ANION GAP SERPL CALCULATED.3IONS-SCNC: 10.3 MMOL/L (ref 5–15)
BASOPHILS # BLD AUTO: 0.09 10*3/MM3 (ref 0–0.2)
BASOPHILS NFR BLD AUTO: 2.2 % (ref 0–1.5)
BUN SERPL-MCNC: 23 MG/DL (ref 8–23)
BUN/CREAT SERPL: 11.5 (ref 7–25)
CALCIUM SPEC-SCNC: 8.6 MG/DL (ref 8.6–10.5)
CHLORIDE SERPL-SCNC: 109 MMOL/L (ref 98–107)
CO2 SERPL-SCNC: 21.7 MMOL/L (ref 22–29)
CREAT SERPL-MCNC: 2 MG/DL (ref 0.76–1.27)
DEPRECATED RDW RBC AUTO: 56.2 FL (ref 37–54)
EGFRCR SERPLBLD CKD-EPI 2021: 31.7 ML/MIN/1.73
EOSINOPHIL # BLD AUTO: 0.17 10*3/MM3 (ref 0–0.4)
EOSINOPHIL NFR BLD AUTO: 4.1 % (ref 0.3–6.2)
ERYTHROCYTE [DISTWIDTH] IN BLOOD BY AUTOMATED COUNT: 17.5 % (ref 12.3–15.4)
GLUCOSE SERPL-MCNC: 142 MG/DL (ref 65–99)
HCT VFR BLD AUTO: 25.2 % (ref 37.5–51)
HGB BLD-MCNC: 8.3 G/DL (ref 13–17.7)
IMM GRANULOCYTES # BLD AUTO: 0.02 10*3/MM3 (ref 0–0.05)
IMM GRANULOCYTES NFR BLD AUTO: 0.5 % (ref 0–0.5)
LYMPHOCYTES # BLD AUTO: 1.32 10*3/MM3 (ref 0.7–3.1)
LYMPHOCYTES NFR BLD AUTO: 32 % (ref 19.6–45.3)
MCH RBC QN AUTO: 30.6 PG (ref 26.6–33)
MCHC RBC AUTO-ENTMCNC: 32.9 G/DL (ref 31.5–35.7)
MCV RBC AUTO: 93 FL (ref 79–97)
MONOCYTES # BLD AUTO: 0.26 10*3/MM3 (ref 0.1–0.9)
MONOCYTES NFR BLD AUTO: 6.3 % (ref 5–12)
NEUTROPHILS NFR BLD AUTO: 2.26 10*3/MM3 (ref 1.7–7)
NEUTROPHILS NFR BLD AUTO: 54.9 % (ref 42.7–76)
NRBC BLD AUTO-RTO: 0 /100 WBC (ref 0–0.2)
PLATELET # BLD AUTO: 50 10*3/MM3 (ref 140–450)
PMV BLD AUTO: 12.7 FL (ref 6–12)
POTASSIUM SERPL-SCNC: 3.6 MMOL/L (ref 3.5–5.2)
RBC # BLD AUTO: 2.71 10*6/MM3 (ref 4.14–5.8)
SODIUM SERPL-SCNC: 141 MMOL/L (ref 136–145)
WBC NRBC COR # BLD: 4.12 10*3/MM3 (ref 3.4–10.8)

## 2023-10-13 PROCEDURE — 63710000001 METOPROLOL SUCCINATE XL 25 MG TABLET SUSTAINED-RELEASE 24 HOUR: Performed by: INTERNAL MEDICINE

## 2023-10-13 PROCEDURE — 63710000001 AMLODIPINE 5 MG TABLET: Performed by: INTERNAL MEDICINE

## 2023-10-13 PROCEDURE — A9270 NON-COVERED ITEM OR SERVICE: HCPCS | Performed by: INTERNAL MEDICINE

## 2023-10-13 PROCEDURE — 63710000001 ROSUVASTATIN 20 MG TABLET: Performed by: INTERNAL MEDICINE

## 2023-10-13 PROCEDURE — 63710000001 SENNOSIDES-DOCUSATE 8.6-50 MG TABLET: Performed by: INTERNAL MEDICINE

## 2023-10-13 PROCEDURE — 85025 COMPLETE CBC W/AUTO DIFF WBC: CPT | Performed by: INTERNAL MEDICINE

## 2023-10-13 PROCEDURE — G0378 HOSPITAL OBSERVATION PER HR: HCPCS

## 2023-10-13 PROCEDURE — 63710000001 TRAZODONE 50 MG TABLET: Performed by: INTERNAL MEDICINE

## 2023-10-13 PROCEDURE — 63710000001 TAMSULOSIN 0.4 MG CAPSULE: Performed by: INTERNAL MEDICINE

## 2023-10-13 PROCEDURE — 63710000001 LISINOPRIL 5 MG TABLET: Performed by: INTERNAL MEDICINE

## 2023-10-13 PROCEDURE — 63710000001 CETIRIZINE 10 MG TABLET: Performed by: INTERNAL MEDICINE

## 2023-10-13 PROCEDURE — 80048 BASIC METABOLIC PNL TOTAL CA: CPT | Performed by: INTERNAL MEDICINE

## 2023-10-13 PROCEDURE — 63710000001 PANTOPRAZOLE 40 MG TABLET DELAYED-RELEASE: Performed by: INTERNAL MEDICINE

## 2023-10-13 PROCEDURE — 63710000001 SODIUM BICARBONATE 650 MG TABLET: Performed by: INTERNAL MEDICINE

## 2023-10-13 RX ADMIN — DOCUSATE SODIUM 50MG AND SENNOSIDES 8.6MG 2 TABLET: 8.6; 5 TABLET, FILM COATED ORAL at 20:27

## 2023-10-13 RX ADMIN — LISINOPRIL 5 MG: 5 TABLET ORAL at 09:48

## 2023-10-13 RX ADMIN — SODIUM BICARBONATE 650 MG TABLET 1300 MG: at 09:48

## 2023-10-13 RX ADMIN — Medication 10 ML: at 20:29

## 2023-10-13 RX ADMIN — METOPROLOL SUCCINATE 25 MG: 25 TABLET, EXTENDED RELEASE ORAL at 09:48

## 2023-10-13 RX ADMIN — SODIUM BICARBONATE 650 MG TABLET 1300 MG: at 20:27

## 2023-10-13 RX ADMIN — TAMSULOSIN HYDROCHLORIDE 0.4 MG: 0.4 CAPSULE ORAL at 09:48

## 2023-10-13 RX ADMIN — CETIRIZINE HYDROCHLORIDE 10 MG: 10 TABLET, FILM COATED ORAL at 09:47

## 2023-10-13 RX ADMIN — AMLODIPINE BESYLATE 10 MG: 5 TABLET ORAL at 09:48

## 2023-10-13 RX ADMIN — PANTOPRAZOLE SODIUM 40 MG: 40 TABLET, DELAYED RELEASE ORAL at 09:48

## 2023-10-13 RX ADMIN — TRAZODONE HYDROCHLORIDE 25 MG: 50 TABLET ORAL at 20:27

## 2023-10-13 RX ADMIN — ROSUVASTATIN 40 MG: 20 TABLET, FILM COATED ORAL at 20:27

## 2023-10-13 RX ADMIN — DOCUSATE SODIUM 50MG AND SENNOSIDES 8.6MG 2 TABLET: 8.6; 5 TABLET, FILM COATED ORAL at 09:48

## 2023-10-13 RX ADMIN — Medication 10 ML: at 09:48

## 2023-10-13 RX ADMIN — PANTOPRAZOLE SODIUM 40 MG: 40 TABLET, DELAYED RELEASE ORAL at 20:27

## 2023-10-13 NOTE — PLAN OF CARE
Goal Outcome Evaluation:  Patient with no acute events thus far this shift.  Patient with no voiced needs at this time.  Continue plan of care.

## 2023-10-13 NOTE — PLAN OF CARE
Goal Outcome Evaluation:  Plan of Care Reviewed With: patient, spouse      Colonoscopy performed showing Diverticulosis. No complaints this shift. VS WNL. Awaiting rehab placement.

## 2023-10-13 NOTE — DISCHARGE SUMMARY
[facility]         DISCHARGE SUMMARY    Patient Name: Selvin Ferguson  : 1935  MRN: [id number]    Date of Admission: 10/9/2023  Date of Discharge: 10/13/2023  Primary Care Physician: Kayla Renee APRN    Consults       Date and Time Order Name Status Description    10/9/2023 12:53 PM Inpatient Gastroenterology Consult      2023  9:28 AM Inpatient Gastroenterology Consult Completed             Presenting Problem:   Severe anemia [D64.9]  Anemia [D64.9]    Active and Resolved Hospital Problems:  Active Hospital Problems    Diagnosis POA    **Anemia [D64.9] Yes      Resolved Hospital Problems   No resolved problems to display.         Hospital Course     Hospital Course:  Selvin Ferguson is a 87 y.o. male and is known to have dementia also probably has myelodysplastic syndrome and anemia of chronic disease patient was admitted because of very severe anemia previously done EGD was unremarkable patient therefore had been ordered to get a colonoscopy it was difficult to get a colonoscopy because he would not drink GoLytely partly responsible because of his dementia however an adequate colonoscopy was performed by Dr. Lei and he was found to have diverticular disease no active bleeding was seen most likely his blood loss becomes from diverticular disease he has been getting darbepoetin as an outpatient through the [facility], I have discussed with his wife that she needs to keep his appointments with the [facility] and continue the treatment as has been recommended by them      DISCHARGE Follow Up Recommendations for labs and diagnostics: Anemia of chronic disease because of renal failure and myelodysplastic syndrome a bone marrow aspiration and biopsy has been performed      Pertinent  and/or Most Recent Results     LAB RESULTS:      Lab 10/13/23  0453 10/10/23  0511 10/09/23  1313   WBC 4.12 7.25 4.92   HEMOGLOBIN 8.3* 8.8* 6.0*   HEMATOCRIT 25.2* 26.7* 19.7*   PLATELETS 50*  77* 88*   NEUTROS ABS 2.26  --   --    IMMATURE GRANS (ABS) 0.02  --   --    LYMPHS ABS 1.32  --   --    MONOS ABS 0.26  --   --    EOS ABS 0.17  --   --    MCV 93.0 92.1 100.5*   PROTIME  --  16.0*  --          Lab 10/13/23  0453 10/10/23  0511 10/09/23  1313   SODIUM 141 140 138   POTASSIUM 3.6 4.2 4.3   CHLORIDE 109* 107 106   CO2 21.7* 20.6* 21.6*   ANION GAP 10.3 12.4 10.4   BUN 23 27* 31*   CREATININE 2.00* 2.26* 2.47*   EGFR 31.7* 27.4* 24.6*   GLUCOSE 142* 173* 245*   CALCIUM 8.6 7.8* 8.5*         Lab 10/10/23  0511 10/09/23  1313   TOTAL PROTEIN 0.4* 7.1   ALBUMIN >18.0* 3.7   GLOBULIN  --  3.4   ALT (SGPT) 8 8   AST (SGOT) 13 12   BILIRUBIN 1.0 1.0   ALK PHOS 81 86         Lab 10/10/23  0511   PROTIME 16.0*   INR 1.27*             Lab 10/09/23  1313   ABO TYPING O   RH TYPING Positive   ANTIBODY SCREEN Negative         Brief Urine Lab Results  (Last result in the past 365 days)        Color   Clarity   Blood   Leuk Est   Nitrite   Protein   CREAT   Urine HCG        09/18/23 2351 Yellow   Clear   Negative   Negative   Negative   Trace                 Microbiology Results (last 10 days)       ** No results found for the last 240 hours. **            CT Chest Without Contrast Diagnostic    Result Date: 9/21/2023  Impression:   1. Small layering pleural effusions with mild compressive atelectasis on both lower lobes. 2. Cylindrical bronchiectasis in both lower lobes. 3. Suggestion of mild fibrosis in the basilar segments of both lower lobes and the inferior lingula.  There is no honeycombing. 4. Mild-moderate emphysema. 5. A few small patchy areas of densities felt to represent a nonspecific infectious/inflammatory process. 6. Small hiatal hernia with thickening of the distal wall of the esophagus.  This is probably due to gastroesophageal reflux.      DANAE BLAIR MD       Electronically Signed and Approved By: DANAE BLAIR MD on 9/21/2023 at 15:01             CT Abdomen Pelvis Without Contrast    Result Date:  9/21/2023  Impression:    1. No adenopathy within the abdomen or pelvis.  2. Small hiatal hernia with nonspecific circumferential thickening of the distal esophagus and proximal stomach.  Neoplasm not excluded.  3. Small bilateral pleural effusions.  Bibasilar cylindrical bronchiectasis and probable chronic interstitial changes.  4. Indeterminate bilateral renal lesions.  These could be more definitively characterized with a contrasted study.  5. Cholelithiasis.  6. Left inguinal hernia containing fat and portion of descending colon without evidence of bowel obstruction.  6. Additional findings as given above.      LOW YOO MD       Electronically Signed and Approved By: LOW YOO MD on 9/21/2023 at 9:34             XR Chest 1 View    Result Date: 9/18/2023  Impression:  There is possible new left basilar pneumonia.  Consider close interval clinical and if necessary imaging follow-up to ensure a benign progression.      Please note that portions of this note were completed with a voice recognition program.  MARISSA WHITE JR, MD       Electronically Signed and Approved By: MARISSA WHITE JR, MD on 9/18/2023 at 22:44                       Results for orders placed during the hospital encounter of 09/18/23    Adult Transthoracic Echo Complete W/ Cont if Necessary Per Protocol    Interpretation Summary    Left ventricular ejection fraction appears to be 56 - 60%.    Left ventricular wall thickness is consistent with mild concentric hypertrophy.    The left atrial cavity is mildly dilated.    Moderate tricuspid valve regurgitation is present.    Estimated right ventricular systolic pressure from tricuspid regurgitation is mildly elevated (35-45 mmHg).    There were no apparent intracardiac masses, vegetations or thrombi.      Labs Pending at Discharge:        Discharge Details        Discharge Medications        Changes to Medications        Instructions Start Date   metoprolol succinate XL 25 MG 24 hr  tablet  Commonly known as: TOPROL-XL  What changed: how much to take   25 mg, Oral, Daily      pantoprazole 40 MG EC tablet  Commonly known as: PROTONIX  What changed: when to take this   40 mg, Oral, 2 Times Daily             Continue These Medications        Instructions Start Date   amLODIPine 10 MG tablet  Commonly known as: NORVASC   10 mg, Oral, Daily      aspirin 81 MG EC tablet   81 mg, Oral, Every 24 Hours      clopidogrel 75 MG tablet  Commonly known as: PLAVIX   75 mg, Oral, Every 24 Hours      Darbepoetin New 40 MCG/0.4ML solution prefilled syringe   40 mcg, Subcutaneous, Every 14 Days      fexofenadine 60 MG tablet  Commonly known as: ALLEGRA   60 mg, Oral, Daily PRN      fluticasone 50 MCG/ACT nasal spray  Commonly known as: FLONASE   2 sprays, Each Nare, Daily PRN      hydrophilic ointment   1 application , Topical, 2 Times Daily PRN      lidocaine 5 %  Commonly known as: LIDODERM   1 patch, Transdermal, Daily PRN      lisinopril 10 MG tablet  Commonly known as: PRINIVIL,ZESTRIL   5 mg, Oral, Daily      nitroglycerin 0.4 MG SL tablet  Commonly known as: NITROSTAT   0.4 mg, Oral, Every 5 Minutes PRN      rosuvastatin 40 MG tablet  Commonly known as: CRESTOR   40 mg, Oral, Daily      sodium bicarbonate 650 MG tablet   1,300 mg, Oral, 2 Times Daily      tamsulosin 0.4 MG capsule 24 hr capsule  Commonly known as: FLOMAX   0.4 mg, Oral, Daily      traZODone 50 MG tablet  Commonly known as: DESYREL   25 mg, Oral, Nightly               No Known Allergies      Discharge Disposition:  Home-Health Care [facility]    Diet:  Hospital:  Diet Order   Procedures    Diet: Regular/House Diet; Texture: Regular Texture (IDDSI 7); Fluid Consistency: Thin (IDDSI 0)         Discharge Activity: As tolerated        CODE STATUS:  Code Status and Medical Interventions:   Ordered at: 10/09/23 1253     Level Of Support Discussed With:    Patient     Code Status (Patient has no pulse and is not breathing):    CPR (Attempt to  Resuscitate)     Medical Interventions (Patient has pulse or is breathing):    Full Support         Future Appointments   Date Time Provider Department Center   1/23/2024  1:30 PM Rosy Palomino APRN [facility]           Time spent on Discharge including face to face service: 45 minutes    Electronically signed by Tristin Reece MD, 10/13/23, 10:49 AM EDT.    Part of this note may be an electronic transcription/translation of spoken language to printed text using the Dragon Dictation System.

## 2023-10-14 VITALS
TEMPERATURE: 98.7 F | SYSTOLIC BLOOD PRESSURE: 119 MMHG | RESPIRATION RATE: 18 BRPM | HEART RATE: 89 BPM | OXYGEN SATURATION: 96 % | DIASTOLIC BLOOD PRESSURE: 51 MMHG

## 2023-10-14 PROCEDURE — 63710000001 PANTOPRAZOLE 40 MG TABLET DELAYED-RELEASE: Performed by: INTERNAL MEDICINE

## 2023-10-14 PROCEDURE — 63710000001 AMLODIPINE 5 MG TABLET: Performed by: INTERNAL MEDICINE

## 2023-10-14 PROCEDURE — A9270 NON-COVERED ITEM OR SERVICE: HCPCS | Performed by: INTERNAL MEDICINE

## 2023-10-14 PROCEDURE — 63710000001 SODIUM BICARBONATE 650 MG TABLET: Performed by: INTERNAL MEDICINE

## 2023-10-14 PROCEDURE — 63710000001 TAMSULOSIN 0.4 MG CAPSULE: Performed by: INTERNAL MEDICINE

## 2023-10-14 PROCEDURE — 63710000001 METOPROLOL SUCCINATE XL 25 MG TABLET SUSTAINED-RELEASE 24 HOUR: Performed by: INTERNAL MEDICINE

## 2023-10-14 PROCEDURE — 63710000001 SENNOSIDES-DOCUSATE 8.6-50 MG TABLET: Performed by: INTERNAL MEDICINE

## 2023-10-14 PROCEDURE — G0378 HOSPITAL OBSERVATION PER HR: HCPCS

## 2023-10-14 PROCEDURE — 63710000001 CETIRIZINE 10 MG TABLET: Performed by: INTERNAL MEDICINE

## 2023-10-14 PROCEDURE — 63710000001 LISINOPRIL 5 MG TABLET: Performed by: INTERNAL MEDICINE

## 2023-10-14 RX ADMIN — CETIRIZINE HYDROCHLORIDE 10 MG: 10 TABLET, FILM COATED ORAL at 09:31

## 2023-10-14 RX ADMIN — SODIUM BICARBONATE 650 MG TABLET 1300 MG: at 09:31

## 2023-10-14 RX ADMIN — PANTOPRAZOLE SODIUM 40 MG: 40 TABLET, DELAYED RELEASE ORAL at 09:31

## 2023-10-14 RX ADMIN — LISINOPRIL 5 MG: 5 TABLET ORAL at 09:32

## 2023-10-14 RX ADMIN — Medication 10 ML: at 09:33

## 2023-10-14 RX ADMIN — DOCUSATE SODIUM 50MG AND SENNOSIDES 8.6MG 2 TABLET: 8.6; 5 TABLET, FILM COATED ORAL at 09:31

## 2023-10-14 RX ADMIN — AMLODIPINE BESYLATE 10 MG: 5 TABLET ORAL at 09:32

## 2023-10-14 RX ADMIN — TAMSULOSIN HYDROCHLORIDE 0.4 MG: 0.4 CAPSULE ORAL at 09:32

## 2023-10-14 RX ADMIN — METOPROLOL SUCCINATE 25 MG: 25 TABLET, EXTENDED RELEASE ORAL at 09:31

## 2023-10-14 NOTE — DISCHARGE SUMMARY
[facility]         DISCHARGE SUMMARY    Patient Name: Selvin Ferguson  : 1935  MRN: [id number]    Date of Admission: 10/9/2023  Date of Discharge: 10/14/2023  Primary Care Physician: Kayla Renee APRN    Consults       Date and Time Order Name Status Description    10/9/2023 12:53 PM Inpatient Gastroenterology Consult      2023  9:28 AM Inpatient Gastroenterology Consult Completed             Presenting Problem:   Severe anemia [D64.9]  Anemia [D64.9]    Active and Resolved Hospital Problems:  Active Hospital Problems    Diagnosis POA    **Anemia [D64.9] Yes      Resolved Hospital Problems   No resolved problems to display.         Hospital Course     Hospital Course:  Selvin Ferguson is a 87 y.o. male was supposed to have been discharged home however family wanted him to go to the rehab so arrangements were made and patient is now being discharged to the rehab he was admitted because of severe anemia was transfused colonoscopy was performed showed diverticular disease probably was losing blood from there he also has myelodysplastic syndrome and anemia of chronic disease because of renal failure a bone marrow aspiration has been done as an outpatient and I will see him in 2 weeks and a follow-up visit as an outpatient      DISCHARGE Follow Up Recommendations for labs and diagnostics: Bone marrow aspiration has been done patient has probably myelodysplastic syndrome      Pertinent  and/or Most Recent Results     LAB RESULTS:      Lab 10/13/23  0453 10/10/23  0511 10/09/23  1313   WBC 4.12 7.25 4.92   HEMOGLOBIN 8.3* 8.8* 6.0*   HEMATOCRIT 25.2* 26.7* 19.7*   PLATELETS 50* 77* 88*   NEUTROS ABS 2.26  --   --    IMMATURE GRANS (ABS) 0.02  --   --    LYMPHS ABS 1.32  --   --    MONOS ABS 0.26  --   --    EOS ABS 0.17  --   --    MCV 93.0 92.1 100.5*   PROTIME  --  16.0*  --          Lab 10/13/23  0453 10/10/23  0511 10/09/23  1313   SODIUM 141 140 138   POTASSIUM 3.6 4.2 4.3    CHLORIDE 109* 107 106   CO2 21.7* 20.6* 21.6*   ANION GAP 10.3 12.4 10.4   BUN 23 27* 31*   CREATININE 2.00* 2.26* 2.47*   EGFR 31.7* 27.4* 24.6*   GLUCOSE 142* 173* 245*   CALCIUM 8.6 7.8* 8.5*         Lab 10/10/23  0511 10/09/23  1313   TOTAL PROTEIN 0.4* 7.1   ALBUMIN >18.0* 3.7   GLOBULIN  --  3.4   ALT (SGPT) 8 8   AST (SGOT) 13 12   BILIRUBIN 1.0 1.0   ALK PHOS 81 86         Lab 10/10/23  0511   PROTIME 16.0*   INR 1.27*             Lab 10/09/23  1313   ABO TYPING O   RH TYPING Positive   ANTIBODY SCREEN Negative         Brief Urine Lab Results  (Last result in the past 365 days)        Color   Clarity   Blood   Leuk Est   Nitrite   Protein   CREAT   Urine HCG        09/18/23 2351 Yellow   Clear   Negative   Negative   Negative   Trace                 Microbiology Results (last 10 days)       ** No results found for the last 240 hours. **            CT Chest Without Contrast Diagnostic    Result Date: 9/21/2023  Impression:   1. Small layering pleural effusions with mild compressive atelectasis on both lower lobes. 2. Cylindrical bronchiectasis in both lower lobes. 3. Suggestion of mild fibrosis in the basilar segments of both lower lobes and the inferior lingula.  There is no honeycombing. 4. Mild-moderate emphysema. 5. A few small patchy areas of densities felt to represent a nonspecific infectious/inflammatory process. 6. Small hiatal hernia with thickening of the distal wall of the esophagus.  This is probably due to gastroesophageal reflux.      DANAE BLAIR MD       Electronically Signed and Approved By: DANAE BLAIR MD on 9/21/2023 at 15:01             CT Abdomen Pelvis Without Contrast    Result Date: 9/21/2023  Impression:    1. No adenopathy within the abdomen or pelvis.  2. Small hiatal hernia with nonspecific circumferential thickening of the distal esophagus and proximal stomach.  Neoplasm not excluded.  3. Small bilateral pleural effusions.  Bibasilar cylindrical bronchiectasis and probable chronic  interstitial changes.  4. Indeterminate bilateral renal lesions.  These could be more definitively characterized with a contrasted study.  5. Cholelithiasis.  6. Left inguinal hernia containing fat and portion of descending colon without evidence of bowel obstruction.  6. Additional findings as given above.      LOW YOO MD       Electronically Signed and Approved By: LOW YOO MD on 9/21/2023 at 9:34             XR Chest 1 View    Result Date: 9/18/2023  Impression:  There is possible new left basilar pneumonia.  Consider close interval clinical and if necessary imaging follow-up to ensure a benign progression.      Please note that portions of this note were completed with a voice recognition program.  MARISSA WHITE JR, MD       Electronically Signed and Approved By: MARISSA WHITE JR, MD on 9/18/2023 at 22:44                       Results for orders placed during the hospital encounter of 09/18/23    Adult Transthoracic Echo Complete W/ Cont if Necessary Per Protocol    Interpretation Summary    Left ventricular ejection fraction appears to be 56 - 60%.    Left ventricular wall thickness is consistent with mild concentric hypertrophy.    The left atrial cavity is mildly dilated.    Moderate tricuspid valve regurgitation is present.    Estimated right ventricular systolic pressure from tricuspid regurgitation is mildly elevated (35-45 mmHg).    There were no apparent intracardiac masses, vegetations or thrombi.      Labs Pending at Discharge:        Discharge Details        Discharge Medications        Changes to Medications        Instructions Start Date   metoprolol succinate XL 25 MG 24 hr tablet  Commonly known as: TOPROL-XL  What changed: how much to take   25 mg, Oral, Daily      pantoprazole 40 MG EC tablet  Commonly known as: PROTONIX  What changed: when to take this   40 mg, Oral, 2 Times Daily             Continue These Medications        Instructions Start Date   amLODIPine 10 MG tablet  Commonly  known as: NORVASC   10 mg, Oral, Daily      aspirin 81 MG EC tablet   81 mg, Oral, Every 24 Hours      clopidogrel 75 MG tablet  Commonly known as: PLAVIX   75 mg, Oral, Every 24 Hours      Darbepoetin New 40 MCG/0.4ML solution prefilled syringe   40 mcg, Subcutaneous, Every 14 Days      fexofenadine 60 MG tablet  Commonly known as: ALLEGRA   60 mg, Oral, Daily PRN      fluticasone 50 MCG/ACT nasal spray  Commonly known as: FLONASE   2 sprays, Each Nare, Daily PRN      hydrophilic ointment   1 application , Topical, 2 Times Daily PRN      lidocaine 5 %  Commonly known as: LIDODERM   1 patch, Transdermal, Daily PRN      lisinopril 10 MG tablet  Commonly known as: PRINIVIL,ZESTRIL   5 mg, Oral, Daily      nitroglycerin 0.4 MG SL tablet  Commonly known as: NITROSTAT   0.4 mg, Oral, Every 5 Minutes PRN      rosuvastatin 40 MG tablet  Commonly known as: CRESTOR   40 mg, Oral, Daily      sodium bicarbonate 650 MG tablet   1,300 mg, Oral, 2 Times Daily      tamsulosin 0.4 MG capsule 24 hr capsule  Commonly known as: FLOMAX   0.4 mg, Oral, Daily      traZODone 50 MG tablet  Commonly known as: DESYREL   25 mg, Oral, Nightly               No Known Allergies      Discharge Disposition:  Skilled Nursing Facility (DC - External)    Diet:  Hospital:  Diet Order   Procedures    Diet: Regular/House Diet; Texture: Regular Texture (IDDSI 7); Fluid Consistency: Thin (IDDSI 0)         Discharge Activity: As tolerated        CODE STATUS:  Code Status and Medical Interventions:   Ordered at: 10/09/23 1253     Level Of Support Discussed With:    Patient     Code Status (Patient has no pulse and is not breathing):    CPR (Attempt to Resuscitate)     Medical Interventions (Patient has pulse or is breathing):    Full Support         Future Appointments   Date Time Provider Department Center   1/23/2024  1:30 PM Rosy Palomino APRN [facility]           Time spent on Discharge including face to face service: 45  minutes    Electronically signed by Tristin Reece MD, 10/14/23, 12:15 PM EDT.    Part of this note may be an electronic transcription/translation of spoken language to printed text using the Dragon Dictation System.

## 2023-10-14 NOTE — PLAN OF CARE
Goal Outcome Evaluation:              Problem: Adult Inpatient Plan of Care  Goal: Plan of Care Review  Outcome: Met  Goal: Patient-Specific Goal (Individualized)  Outcome: Met  Goal: Absence of Hospital-Acquired Illness or Injury  Outcome: Met  Intervention: Identify and Manage Fall Risk  Recent Flowsheet Documentation  Taken 10/14/2023 1532 by Eneida Lozoya RN  Safety Promotion/Fall Prevention: safety round/check completed  Taken 10/14/2023 1330 by Eneida Lozoya RN  Safety Promotion/Fall Prevention: safety round/check completed  Taken 10/14/2023 1100 by Eneida Lozoya RN  Safety Promotion/Fall Prevention: safety round/check completed  Taken 10/14/2023 0930 by Eneida Lozoya RN  Safety Promotion/Fall Prevention:   nonskid shoes/slippers when out of bed   safety round/check completed  Taken 10/14/2023 0735 by Eneida Lozoya RN  Safety Promotion/Fall Prevention:   nonskid shoes/slippers when out of bed   safety round/check completed  Goal: Optimal Comfort and Wellbeing  Outcome: Met  Intervention: Provide Person-Centered Care  Recent Flowsheet Documentation  Taken 10/14/2023 0930 by Eneida Lozoya RN  Trust Relationship/Rapport:   care explained   choices provided   emotional support provided   empathic listening provided   questions answered   questions encouraged   reassurance provided   thoughts/feelings acknowledged  Goal: Readiness for Transition of Care  Outcome: Met     Problem: Fall Injury Risk  Goal: Absence of Fall and Fall-Related Injury  Outcome: Met  Intervention: Promote Injury-Free Environment  Recent Flowsheet Documentation  Taken 10/14/2023 1532 by Eneida Lozoya RN  Safety Promotion/Fall Prevention: safety round/check completed  Taken 10/14/2023 1330 by Eneida Lozoya RN  Safety Promotion/Fall Prevention: safety round/check completed  Taken 10/14/2023 1100 by Eneida Lozoya RN  Safety Promotion/Fall Prevention: safety round/check completed  Taken 10/14/2023 0930 by Eneida Loozya  LB RN  Safety Promotion/Fall Prevention:   nonskid shoes/slippers when out of bed   safety round/check completed  Taken 10/14/2023 0735 by Eneida Lozoya RN  Safety Promotion/Fall Prevention:   nonskid shoes/slippers when out of bed   safety round/check completed     Problem: Anemia  Goal: Anemia Symptom Improvement  Outcome: Met  Intervention: Monitor and Manage Anemia  Recent Flowsheet Documentation  Taken 10/14/2023 1532 by Eneida Lozoya RN  Safety Promotion/Fall Prevention: safety round/check completed  Taken 10/14/2023 1330 by Eneida Lozoya RN  Safety Promotion/Fall Prevention: safety round/check completed  Taken 10/14/2023 1100 by Eneida Lozoya RN  Safety Promotion/Fall Prevention: safety round/check completed  Taken 10/14/2023 0930 by Eneida Lozoya RN  Safety Promotion/Fall Prevention:   nonskid shoes/slippers when out of bed   safety round/check completed  Taken 10/14/2023 0735 by Eneida Lozoya RN  Safety Promotion/Fall Prevention:   nonskid shoes/slippers when out of bed   safety round/check completed     Problem: Skin Injury Risk Increased  Goal: Skin Health and Integrity  Outcome: Met  Intervention: Optimize Skin Protection  Recent Flowsheet Documentation  Taken 10/14/2023 0930 by Eneida Lozoya RN  Head of Bed (HOB) Positioning: HOB elevated  Taken 10/14/2023 0735 by Eneida Lozoya RN  Head of Bed (HOB) Positioning: HOB elevated

## 2023-10-14 NOTE — PLAN OF CARE
Goal Outcome Evaluation:  Plan of Care Reviewed With: patient         VS WNL. Discharging to [facility] tomorrow. No complaints this shift.

## 2023-10-14 NOTE — PLAN OF CARE
Goal Outcome Evaluation:  Patient with no acute events this shift.  Patient with no needs or concerns voiced thus far.  Patient to discharge to [facility] today. Continue plan of care.

## 2023-10-19 ENCOUNTER — TELEPHONE (OUTPATIENT)
Dept: GASTROENTEROLOGY | Facility: CLINIC | Age: 88
End: 2023-10-19
Payer: MEDICARE

## 2023-10-19 NOTE — TELEPHONE ENCOUNTER
Colonoscopy 10/12/2023 was normal, good bowel prep, diverticulosis was found, there was no pathology  Patient was discharged 10/14/2023, he follows with Dr. Reece, patient had EGD in September that was normal other than a hiatal hernia    Patient should follow-up with us if he continues to have any persistent iron deficiency anemia-please fax notes and scopes to Dr. Reece

## 2023-10-20 ENCOUNTER — HOSPITAL ENCOUNTER (EMERGENCY)
Facility: HOSPITAL | Age: 88
Discharge: HOME | End: 2023-10-21
Attending: EMERGENCY MEDICINE
Payer: MEDICARE

## 2023-10-20 ENCOUNTER — APPOINTMENT (OUTPATIENT)
Dept: GENERAL RADIOLOGY | Facility: HOSPITAL | Age: 88
End: 2023-10-20
Payer: MEDICARE

## 2023-10-20 DIAGNOSIS — D64.9 CHRONIC ANEMIA: Primary | ICD-10-CM

## 2023-10-20 LAB
ABO GROUP BLD: NORMAL
ALBUMIN SERPL-MCNC: 3.9 G/DL (ref 3.5–5.2)
ALBUMIN/GLOB SERPL: 1.3 G/DL
ALP SERPL-CCNC: 74 U/L (ref 39–117)
ALT SERPL W P-5'-P-CCNC: 7 U/L (ref 1–41)
ANION GAP SERPL CALCULATED.3IONS-SCNC: 11.3 MMOL/L (ref 5–15)
ANISOCYTOSIS BLD QL: NORMAL
AST SERPL-CCNC: 13 U/L (ref 1–40)
BACTERIA UR QL AUTO: NORMAL /HPF
BASOPHILS # BLD AUTO: 0.1 10*3/MM3 (ref 0–0.2)
BASOPHILS NFR BLD AUTO: 2.1 % (ref 0–1.5)
BILIRUB SERPL-MCNC: 1.4 MG/DL (ref 0–1.2)
BILIRUB UR QL STRIP: NEGATIVE
BLD GP AB SCN SERPL QL: NEGATIVE
BUN SERPL-MCNC: 18 MG/DL (ref 8–23)
BUN/CREAT SERPL: 10.2 (ref 7–25)
CALCIUM SPEC-SCNC: 8.7 MG/DL (ref 8.6–10.5)
CHLORIDE SERPL-SCNC: 106 MMOL/L (ref 98–107)
CLARITY UR: CLEAR
CO2 SERPL-SCNC: 21.7 MMOL/L (ref 22–29)
COLOR UR: ABNORMAL
CREAT SERPL-MCNC: 1.77 MG/DL (ref 0.76–1.27)
D-LACTATE SERPL-SCNC: 1.2 MMOL/L (ref 0.5–2)
DEPRECATED RDW RBC AUTO: 58.4 FL (ref 37–54)
EGFRCR SERPLBLD CKD-EPI 2021: 36.7 ML/MIN/1.73
EOSINOPHIL # BLD AUTO: 0.04 10*3/MM3 (ref 0–0.4)
EOSINOPHIL NFR BLD AUTO: 0.8 % (ref 0.3–6.2)
ERYTHROCYTE [DISTWIDTH] IN BLOOD BY AUTOMATED COUNT: 17.3 % (ref 12.3–15.4)
FLUAV SUBTYP SPEC NAA+PROBE: NOT DETECTED
FLUBV RNA ISLT QL NAA+PROBE: NOT DETECTED
GLOBULIN UR ELPH-MCNC: 3.1 GM/DL
GLUCOSE SERPL-MCNC: 154 MG/DL (ref 65–99)
GLUCOSE UR STRIP-MCNC: NEGATIVE MG/DL
HCT VFR BLD AUTO: 21 % (ref 37.5–51)
HEMOCCULT STL QL IA: NEGATIVE
HGB BLD-MCNC: 6.8 G/DL (ref 13–17.7)
HGB UR QL STRIP.AUTO: NEGATIVE
HOLD SPECIMEN: NORMAL
HOLD SPECIMEN: NORMAL
HYALINE CASTS UR QL AUTO: NORMAL /LPF
IMM GRANULOCYTES # BLD AUTO: 0.05 10*3/MM3 (ref 0–0.05)
IMM GRANULOCYTES NFR BLD AUTO: 1 % (ref 0–0.5)
KETONES UR QL STRIP: ABNORMAL
LEUKOCYTE ESTERASE UR QL STRIP.AUTO: ABNORMAL
LYMPHOCYTES # BLD AUTO: 1.47 10*3/MM3 (ref 0.7–3.1)
LYMPHOCYTES NFR BLD AUTO: 30.2 % (ref 19.6–45.3)
MCH RBC QN AUTO: 30.9 PG (ref 26.6–33)
MCHC RBC AUTO-ENTMCNC: 32.4 G/DL (ref 31.5–35.7)
MCV RBC AUTO: 95.5 FL (ref 79–97)
MONOCYTES # BLD AUTO: 0.22 10*3/MM3 (ref 0.1–0.9)
MONOCYTES NFR BLD AUTO: 4.5 % (ref 5–12)
NEUTROPHILS NFR BLD AUTO: 2.98 10*3/MM3 (ref 1.7–7)
NEUTROPHILS NFR BLD AUTO: 61.4 % (ref 42.7–76)
NITRITE UR QL STRIP: NEGATIVE
NRBC BLD AUTO-RTO: 0 /100 WBC (ref 0–0.2)
PH UR STRIP.AUTO: 7 [PH] (ref 5–8)
PLATELET # BLD AUTO: 59 10*3/MM3 (ref 140–450)
PMV BLD AUTO: ABNORMAL FL
POTASSIUM SERPL-SCNC: 4.4 MMOL/L (ref 3.5–5.2)
PROT SERPL-MCNC: 7 G/DL (ref 6–8.5)
PROT UR QL STRIP: ABNORMAL
RBC # BLD AUTO: 2.2 10*6/MM3 (ref 4.14–5.8)
RBC # UR STRIP: NORMAL /HPF
REF LAB TEST METHOD: NORMAL
RH BLD: POSITIVE
RSV RNA NPH QL NAA+NON-PROBE: NOT DETECTED
SARS-COV-2 RNA RESP QL NAA+PROBE: NOT DETECTED
SMALL PLATELETS BLD QL SMEAR: NORMAL
SODIUM SERPL-SCNC: 139 MMOL/L (ref 136–145)
SP GR UR STRIP: 1.02 (ref 1–1.03)
SQUAMOUS #/AREA URNS HPF: NORMAL /HPF
T&S EXPIRATION DATE: NORMAL
UROBILINOGEN UR QL STRIP: ABNORMAL
WBC # UR STRIP: NORMAL /HPF
WBC MORPH BLD: NORMAL
WBC NRBC COR # BLD: 4.86 10*3/MM3 (ref 3.4–10.8)
WHOLE BLOOD HOLD COAG: NORMAL
WHOLE BLOOD HOLD SPECIMEN: NORMAL

## 2023-10-20 PROCEDURE — 87040 BLOOD CULTURE FOR BACTERIA: CPT | Performed by: EMERGENCY MEDICINE

## 2023-10-20 PROCEDURE — 87637 SARSCOV2&INF A&B&RSV AMP PRB: CPT | Performed by: EMERGENCY MEDICINE

## 2023-10-20 PROCEDURE — 99283 EMERGENCY DEPT VISIT LOW MDM: CPT

## 2023-10-20 PROCEDURE — 80053 COMPREHEN METABOLIC PANEL: CPT | Performed by: EMERGENCY MEDICINE

## 2023-10-20 PROCEDURE — 36430 TRANSFUSION BLD/BLD COMPNT: CPT

## 2023-10-20 PROCEDURE — 86900 BLOOD TYPING SEROLOGIC ABO: CPT | Performed by: EMERGENCY MEDICINE

## 2023-10-20 PROCEDURE — 85025 COMPLETE CBC W/AUTO DIFF WBC: CPT | Performed by: EMERGENCY MEDICINE

## 2023-10-20 PROCEDURE — 86901 BLOOD TYPING SEROLOGIC RH(D): CPT | Performed by: EMERGENCY MEDICINE

## 2023-10-20 PROCEDURE — 86900 BLOOD TYPING SEROLOGIC ABO: CPT

## 2023-10-20 PROCEDURE — 86923 COMPATIBILITY TEST ELECTRIC: CPT

## 2023-10-20 PROCEDURE — 82274 ASSAY TEST FOR BLOOD FECAL: CPT | Performed by: EMERGENCY MEDICINE

## 2023-10-20 PROCEDURE — 81001 URINALYSIS AUTO W/SCOPE: CPT | Performed by: EMERGENCY MEDICINE

## 2023-10-20 PROCEDURE — P9040 RBC LEUKOREDUCED IRRADIATED: HCPCS

## 2023-10-20 PROCEDURE — P9612 CATHETERIZE FOR URINE SPEC: HCPCS

## 2023-10-20 PROCEDURE — 85007 BL SMEAR W/DIFF WBC COUNT: CPT | Performed by: EMERGENCY MEDICINE

## 2023-10-20 PROCEDURE — 36415 COLL VENOUS BLD VENIPUNCTURE: CPT

## 2023-10-20 PROCEDURE — 86850 RBC ANTIBODY SCREEN: CPT | Performed by: EMERGENCY MEDICINE

## 2023-10-20 PROCEDURE — 83605 ASSAY OF LACTIC ACID: CPT | Performed by: EMERGENCY MEDICINE

## 2023-10-20 PROCEDURE — 71045 X-RAY EXAM CHEST 1 VIEW: CPT

## 2023-10-20 RX ORDER — SODIUM CHLORIDE 0.9 % (FLUSH) 0.9 %
10 SYRINGE (ML) INJECTION AS NEEDED
Status: DISCONTINUED | OUTPATIENT
Start: 2023-10-20 | End: 2023-10-21 | Stop reason: HOSPADM

## 2023-10-21 VITALS
HEART RATE: 98 BPM | HEIGHT: 71 IN | DIASTOLIC BLOOD PRESSURE: 64 MMHG | SYSTOLIC BLOOD PRESSURE: 110 MMHG | TEMPERATURE: 98.3 F | WEIGHT: 195.11 LBS | OXYGEN SATURATION: 100 % | BODY MASS INDEX: 27.31 KG/M2 | RESPIRATION RATE: 13 BRPM

## 2023-10-21 PROCEDURE — 36430 TRANSFUSION BLD/BLD COMPNT: CPT

## 2023-10-21 PROCEDURE — 86900 BLOOD TYPING SEROLOGIC ABO: CPT

## 2023-10-21 PROCEDURE — 86923 COMPATIBILITY TEST ELECTRIC: CPT

## 2023-10-21 PROCEDURE — P9040 RBC LEUKOREDUCED IRRADIATED: HCPCS

## 2023-10-21 NOTE — ED NOTES
"Attempted to call [facility] x5, each time I get a \"user busy\" signal. Will continue to keep trying.   "

## 2023-10-21 NOTE — ED NOTES
Facility phoned several times with no success speaking to anyone. Night nurses reported they phoned twice with no success. Unable to leave a message.

## 2023-10-21 NOTE — ED PROVIDER NOTES
Time: 8:27 PM EDT  Date of encounter:  10/20/2023  Independent Historian/Clinical History and Information was obtained by:   Nursing Staff    History is limited by: Dementia    Chief Complaint: Low blood count      History of Present Illness:  Patient is a 87 y.o. year old male who presents to the emergency department for evaluation of low blood count.  This patient presents to the emergency department today from a nursing facility after they noticed that he was significantly anemic.  The patient has no current complaints and has a history of dementia.  He also has a history of MDS and also chronic anemia which has required prior transfusions.  Currently the patient is asymptomatic.    HPI    Patient Care Team  Primary Care Provider: Kayla Renee APRN    Past Medical History:     No Known Allergies  Past Medical History:   Diagnosis Date    Dementia     Diabetes mellitus     Hyperlipidemia     Hypertension     Renal disorder      Past Surgical History:   Procedure Laterality Date    CATARACT EXTRACTION      COLONOSCOPY      COLONOSCOPY N/A 10/12/2023    Procedure: COLONOSCOPY;  Surgeon: Renny Tong MD;  Location: [facility] ENDOSCOPY;  Service: Gastroenterology;  Laterality: N/A;  DIVERTICULOSIS    CORONARY ARTERY BYPASS GRAFT      CYSTOSCOPY      ENDOSCOPY N/A 9/20/2023    Procedure: ESOPHAGOGASTRODUODENOSCOPY WITH COLD BIOPSIES;  Surgeon: Renny Tong MD;  Location: [facility] ENDOSCOPY;  Service: Gastroenterology;  Laterality: N/A;  HIATAL HERNIA    PROSTATE SURGERY       Family History   Family history unknown: Yes       Home Medications:  Prior to Admission medications    Medication Sig Start Date End Date Taking? Authorizing Provider   amLODIPine (NORVASC) 10 MG tablet Take 1 tablet by mouth Daily.    Provider, MD Yahir   aspirin 81 MG EC tablet Take 1 tablet by mouth Daily.    Provider, MD Yahir   clopidogrel (PLAVIX) 75 MG tablet Take 1 tablet by mouth Daily. 9/26/23   Zoila Jung  MD CHIDI   Darbepoetin New 40 MCG/0.4ML solution prefilled syringe Inject 40 mcg under the skin into the appropriate area as directed Every 14 (Fourteen) Days.    Yahir Freeman MD   fexofenadine (ALLEGRA) 60 MG tablet Take 1 tablet by mouth Daily As Needed. 5/3/22   Yahir Freeman MD   fluticasone (FLONASE) 50 MCG/ACT nasal spray 2 sprays by Each Nare route Daily As Needed.    Yahir Freeman MD   hydrophilic ointment Apply 1 application  topically to the appropriate area as directed 2 (Two) Times a Day As Needed for Dry Skin.    Yahir Freeman MD   lidocaine (LIDODERM) 5 % Place 1 patch on the skin as directed by provider Daily As Needed for Mild Pain.    Yahir Freeman MD   lisinopril (PRINIVIL,ZESTRIL) 10 MG tablet Take 0.5 tablets by mouth Daily.    Yahir Freeman MD   metoprolol succinate XL (TOPROL-XL) 25 MG 24 hr tablet Take 1 tablet by mouth Daily for 30 days. 9/24/23 10/24/23  Zoila Jung MD   nitroglycerin (NITROSTAT) 0.4 MG SL tablet Take 1 tablet by mouth Every 5 (Five) Minutes As Needed.    Yahir Freeman MD   pantoprazole (PROTONIX) 40 MG EC tablet Take 1 tablet by mouth 2 (Two) Times a Day for 30 days. 9/23/23 10/23/23  Zoila Jung MD   rosuvastatin (CRESTOR) 40 MG tablet Take 1 tablet by mouth Daily.    Yahir Freeman MD   sodium bicarbonate 650 MG tablet Take 2 tablets by mouth 2 (Two) Times a Day for 30 days. 9/23/23 10/23/23  Zoila Jung MD   tamsulosin (FLOMAX) 0.4 MG capsule 24 hr capsule Take 1 capsule by mouth Daily for 30 days. 9/24/23 10/24/23  Zoila Jung MD   traZODone (DESYREL) 50 MG tablet Take 0.5 tablets by mouth Every Night.    Yahir Freeman MD        Social History:   Social History     Tobacco Use    Smoking status: Former     Packs/day: 1     Types: Cigarettes     Quit date: 2013     Years since quitting: 10.8    Smokeless tobacco: Never   Vaping Use    Vaping Use: Never used   Substance Use  "Topics    Alcohol use: Not Currently    Drug use: Never         Review of Systems:  Review of Systems   Unable to perform ROS: Dementia        Physical Exam:  /59 (BP Location: Left arm, Patient Position: Lying)   Pulse 85   Temp 98.6 °F (37 °C) (Oral)   Resp 16   Ht 180.3 cm (71\")   Wt 88.5 kg (195 lb 1.7 oz)   SpO2 95%   BMI 27.21 kg/m²     Physical Exam  Vitals and nursing note reviewed.   Constitutional:       General: He is not in acute distress.     Appearance: Normal appearance. He is not toxic-appearing.   HENT:      Head: Normocephalic and atraumatic.      Mouth/Throat:      Mouth: Mucous membranes are moist.   Eyes:      General: No scleral icterus.  Cardiovascular:      Rate and Rhythm: Normal rate and regular rhythm.      Pulses: Normal pulses.      Heart sounds: Normal heart sounds.   Pulmonary:      Effort: Pulmonary effort is normal. No respiratory distress.      Breath sounds: Normal breath sounds.   Abdominal:      General: Abdomen is flat.      Palpations: Abdomen is soft.      Tenderness: There is no abdominal tenderness.   Musculoskeletal:         General: Normal range of motion.      Cervical back: Normal range of motion and neck supple.   Skin:     General: Skin is warm and dry.   Neurological:      General: No focal deficit present.      Mental Status: He is alert. Mental status is at baseline. He is disoriented.                  Procedures:  Procedures      Medical Decision Making:      Comorbidities that affect care:    Chronic anemia    External Notes reviewed:    Previous Admission Note: For anemia      The following orders were placed and all results were independently analyzed by me:  Orders Placed This Encounter   Procedures    Blood Culture - Blood,    Blood Culture - Blood,    COVID-19, FLU A/B, RSV PCR - Swab, Nasopharynx    XR Chest 1 View    [place] Draw    Comprehensive Metabolic Panel    Lactic Acid, Plasma    Occult Blood, Fecal By Immunoassay - Stool, Per Rectum    " CBC Auto Differential    Scan Slide    Urinalysis With Culture If Indicated - Urine, Catheter    Urinalysis, Microscopic Only - Urine, Clean Catch    NPO Diet NPO Type: Strict NPO    Undress & Gown    Measure Blood Pressure    Vital Signs    Orthostatic Blood Pressure    Verify Informed Consent    Pulse Oximetry    Oxygen Therapy- Nasal Cannula; Titrate 1-6 LPM Per SpO2; 90 - 95%    Type & Screen    Prepare RBC, 2 Units    Insert Peripheral IV    CBC & Differential    Green Top (Gel)    Lavender Top    Gold Top - SST    Light Blue Top       Medications Given in the Emergency Department:  Medications   sodium chloride 0.9 % flush 10 mL (has no administration in time range)        ED Course:         Labs:    Lab Results (last 24 hours)       Procedure Component Value Units Date/Time    CBC & Differential [[id number]]  (Abnormal) Collected: 10/20/23 2010    Specimen: Blood Updated: 10/20/23 2037    Narrative:      The following orders were created for panel order CBC & Differential.  Procedure                               Abnormality         Status                     ---------                               -----------         ------                     CBC Auto Differential[[id number]]        Abnormal            Final result               Scan Slide[[id number]]                                       Final result                 Please view results for these tests on the individual orders.    Comprehensive Metabolic Panel [[id number]]  (Abnormal) Collected: 10/20/23 2010    Specimen: Blood Updated: 10/20/23 2045     Glucose 154 mg/dL      BUN 18 mg/dL      Creatinine 1.77 mg/dL      Sodium 139 mmol/L      Potassium 4.4 mmol/L      Comment: Specimen hemolyzed.  Results may be affected.        Chloride 106 mmol/L      CO2 21.7 mmol/L      Calcium 8.7 mg/dL      Total Protein 7.0 g/dL      Albumin 3.9 g/dL      ALT (SGPT) 7 U/L      Comment: Specimen hemolyzed.  Results may be affected.        AST (SGOT) 13 U/L      Comment:  Specimen hemolyzed.  Results may be affected.        Alkaline Phosphatase 74 U/L      Total Bilirubin 1.4 mg/dL      Globulin 3.1 gm/dL      A/G Ratio 1.3 g/dL      BUN/Creatinine Ratio 10.2     Anion Gap 11.3 mmol/L      eGFR 36.7 mL/min/1.73     Narrative:      GFR Normal >60  Chronic Kidney Disease <60  Kidney Failure <15    The GFR formula is only valid for adults with stable renal function between ages 18 and 70.    Lactic Acid, Plasma [[id number]]  (Normal) Collected: 10/20/23 2010    Specimen: Blood Updated: 10/20/23 2033     Lactate 1.2 mmol/L     CBC Auto Differential [[id number]]  (Abnormal) Collected: 10/20/23 2010    Specimen: Blood Updated: 10/20/23 2037     WBC 4.86 10*3/mm3      RBC 2.20 10*6/mm3      Hemoglobin 6.8 g/dL      Hematocrit 21.0 %      MCV 95.5 fL      MCH 30.9 pg      MCHC 32.4 g/dL      RDW 17.3 %      RDW-SD 58.4 fl      MPV --     Comment: Cannot calculate due to specimen interference.         Platelets 59 10*3/mm3      Neutrophil % 61.4 %      Lymphocyte % 30.2 %      Monocyte % 4.5 %      Eosinophil % 0.8 %      Basophil % 2.1 %      Immature Grans % 1.0 %      Neutrophils, Absolute 2.98 10*3/mm3      Lymphocytes, Absolute 1.47 10*3/mm3      Monocytes, Absolute 0.22 10*3/mm3      Eosinophils, Absolute 0.04 10*3/mm3      Basophils, Absolute 0.10 10*3/mm3      Immature Grans, Absolute 0.05 10*3/mm3      nRBC 0.0 /100 WBC     Narrative:      Appended report. These results have been appended to a previously verified report.    Scan Slide [[id number]] Collected: 10/20/23 2010    Specimen: Blood Updated: 10/20/23 2037     Anisocytosis Slight/1+     WBC Morphology Normal     Platelet Estimate Decreased    Blood Culture - Blood, Arm, Right [[id number]] Collected: 10/20/23 2106    Specimen: Blood from Arm, Right Updated: 10/20/23 2118    Blood Culture - Blood, Arm, Left [[id number]] Collected: 10/20/23 2106    Specimen: Blood from Arm, Left Updated: 10/20/23 2119    COVID-19, FLU A/B, RSV PCR -  Swab, Nasopharynx [[id number]]  (Normal) Collected: 10/20/23 2106    Specimen: Swab from Nasopharynx Updated: 10/20/23 2240     COVID19 Not Detected     Influenza A PCR Not Detected     Influenza B PCR Not Detected     RSV, PCR Not Detected    Narrative:      Fact sheet for providers: https://www.fda.gov/media/068809/download    Fact sheet for patients: https://www.fda.gov/media/640302/download    Test performed by PCR.    Urinalysis With Culture If Indicated - Urine, Catheter [[id number]]  (Abnormal) Collected: 10/20/23 2106    Specimen: Urine, Catheter Updated: 10/20/23 2127     Color, UA Dark Yellow     Appearance, UA Clear     pH, UA 7.0     Specific Gravity, UA 1.019     Glucose, UA Negative     Ketones, UA Trace     Bilirubin, UA Negative     Blood, UA Negative     Protein, UA 30 mg/dL (1+)     Leuk Esterase, UA Trace     Nitrite, UA Negative     Urobilinogen, UA 4.0 E.U./dL    Narrative:      In absence of clinical symptoms, the presence of pyuria, bacteria, and/or nitrites on the urinalysis result does not correlate with infection.    Urinalysis, Microscopic Only - Urine, Catheter [[id number]] Collected: 10/20/23 2106    Specimen: Urine, Catheter Updated: 10/20/23 2127     RBC, UA 0-2 /HPF      WBC, UA 0-2 /HPF      Comment: Urine culture not indicated.        Bacteria, UA None Seen /HPF      Squamous Epithelial Cells, UA 0-2 /HPF      Hyaline Casts, UA 0-2 /LPF      Methodology Automated Microscopy    Occult Blood, Fecal By Immunoassay - Stool, Per Rectum [[id number]]  (Normal) Collected: 10/20/23 2107    Specimen: Stool from Per Rectum Updated: 10/20/23 2131     Occult Blood, Fecal by Immunoassay Negative             Imaging:    XR Chest 1 View    Result Date: 10/20/2023  PROCEDURE: XR CHEST 1 VW  COMPARISONS: 9/28/2023; 9/18/2023; 6/16/2020; 4/8/2005.  INDICATIONS: Weakness, not otherwise specified.  FINDINGS: A single AP upright portable chest radiograph is provided for review.  Small bilateral pleural  effusions persist.  There may be new bilateral atelectasis and/or infiltrate(s), which may represent mild pulmonary edema.  Infectious multifocal pneumonia cannot be excluded.  Pleural-parenchymal scarring is also seen, especially in the left lung base as well as the biapical regions, seen previously.  There may be mild cardiomegaly, as before.  The patient has undergone median sternotomy and CABG surgery.  External artifacts obscure detail.  There are degenerative changes of the imaged spine and the shoulders.  No pneumothorax is identified.  There may be chronic calcified granulomatous disease the chest.        1. Worsening progression is suggested radiographically since the 9/18/2023 study with new bilateral infiltrates, which may represent pulmonary edema.  Infectious multifocal pneumonia cannot be excluded.  2. Small bilateral pleural effusions persist.  3. Mild cardiomegaly is suspected.  No pneumothorax.  4. Please see above comments for further detail.      Please note that portions of this note were completed with a voice recognition program.  MARISSA WHITE JR, MD       Electronically Signed and Approved By: MARISSA WHITE JR, MD on 10/20/2023 at 22:14                 Differential Diagnosis and Discussion:    Weakness: Based on the patient's history, signs, and symptoms, the diffential diagnosis includes but is not limited to meningitis, stroke, sepsis, subarachnoid hemorrhage, intracranial bleeding, encephalitis, acute uti, dehydration, MS, myasthenia gravis, [place], migraine variant, neuromuscular disorders vertigo, electrolyte imbalance, and metabolic disorders.    All labs were reviewed and interpreted by me.    MDM     Amount and/or Complexity of Data Reviewed  Clinical lab tests: reviewed  Tests in the radiology section of CPT®: reviewed  Decide to obtain previous medical records or to obtain history from someone other than the patient: yes             Patient Care Considerations:    CT  ABDOMEN AND PELVIS: I considered ordering a CT scan of the abdomen and pelvis however the patient has no abdominal pain or tenderness      Consultants/Shared Management Plan:    None    Social Determinants of Health:    Patient is a nursing home/assisted living resident and has reliable access to care.      Disposition and Care Coordination:    Discharged: The patient is suitable and stable for discharge with no need for consideration of observation or admission.    I have explained discharge medications and the need for follow up with the patient/caretakers. This was also printed in the discharge instructions. Patient was discharged with the following medications and follow up:      Medication List      No changes were made to your prescriptions during this visit.      Kayla Renee, APRN  [address] [facility] [address]  [phone]    In 1 day         Final diagnoses:   Chronic anemia        ED Disposition       ED Disposition   Discharge    Condition   Stable    Comment   --               This medical record created using voice recognition software.             Galen Crocker,   10/20/23 6720

## 2023-10-25 LAB
BACTERIA SPEC AEROBE CULT: NORMAL
BACTERIA SPEC AEROBE CULT: NORMAL

## 2023-11-15 ENCOUNTER — APPOINTMENT (OUTPATIENT)
Dept: CT IMAGING | Facility: HOSPITAL | Age: 88
DRG: 871 | End: 2023-11-15
Payer: MEDICARE

## 2023-11-15 ENCOUNTER — HOSPITAL ENCOUNTER (INPATIENT)
Facility: HOSPITAL | Age: 88
LOS: 7 days | Discharge: NURSING, REHAB OR LONG-TERM CARE | DRG: 871 | End: 2023-11-22
Attending: EMERGENCY MEDICINE | Admitting: INTERNAL MEDICINE
Payer: MEDICARE

## 2023-11-15 DIAGNOSIS — D64.9 ACUTE ON CHRONIC ANEMIA: ICD-10-CM

## 2023-11-15 DIAGNOSIS — K92.2 ACUTE UPPER GI BLEED: ICD-10-CM

## 2023-11-15 DIAGNOSIS — N39.0 ACUTE UTI (URINARY TRACT INFECTION): ICD-10-CM

## 2023-11-15 DIAGNOSIS — R26.2 DIFFICULTY WALKING: ICD-10-CM

## 2023-11-15 DIAGNOSIS — K59.00 CONSTIPATION, UNSPECIFIED CONSTIPATION TYPE: ICD-10-CM

## 2023-11-15 DIAGNOSIS — A41.9 SEPSIS WITHOUT ACUTE ORGAN DYSFUNCTION, DUE TO UNSPECIFIED ORGANISM: Primary | ICD-10-CM

## 2023-11-15 DIAGNOSIS — K80.20 CALCULUS OF GALLBLADDER WITHOUT CHOLECYSTITIS WITHOUT OBSTRUCTION: ICD-10-CM

## 2023-11-15 LAB
ABO GROUP BLD: NORMAL
ALBUMIN SERPL-MCNC: 4.2 G/DL (ref 3.5–5.2)
ALBUMIN/GLOB SERPL: 1.1 G/DL
ALP SERPL-CCNC: 98 U/L (ref 39–117)
ALT SERPL W P-5'-P-CCNC: 11 U/L (ref 1–41)
ANION GAP SERPL CALCULATED.3IONS-SCNC: 14.7 MMOL/L (ref 5–15)
ANISOCYTOSIS BLD QL: NORMAL
AST SERPL-CCNC: 23 U/L (ref 1–40)
BACTERIA UR QL AUTO: ABNORMAL /HPF
BASOPHILS # BLD AUTO: 0.08 10*3/MM3 (ref 0–0.2)
BASOPHILS NFR BLD AUTO: 0.5 % (ref 0–1.5)
BILIRUB SERPL-MCNC: 1.5 MG/DL (ref 0–1.2)
BILIRUB UR QL STRIP: NEGATIVE
BLD GP AB SCN SERPL QL: NEGATIVE
BUN SERPL-MCNC: 19 MG/DL (ref 8–23)
BUN/CREAT SERPL: 7.1 (ref 7–25)
CALCIUM SPEC-SCNC: 9.1 MG/DL (ref 8.6–10.5)
CHLORIDE SERPL-SCNC: 105 MMOL/L (ref 98–107)
CLARITY UR: ABNORMAL
CO2 SERPL-SCNC: 20.3 MMOL/L (ref 22–29)
COLOR UR: YELLOW
CREAT SERPL-MCNC: 2.69 MG/DL (ref 0.76–1.27)
D-LACTATE SERPL-SCNC: 1.5 MMOL/L (ref 0.5–2)
D-LACTATE SERPL-SCNC: 2.1 MMOL/L (ref 0.5–2)
DEPRECATED RDW RBC AUTO: 51.9 FL (ref 37–54)
EGFRCR SERPLBLD CKD-EPI 2021: 22.1 ML/MIN/1.73
EOSINOPHIL # BLD AUTO: 0.03 10*3/MM3 (ref 0–0.4)
EOSINOPHIL NFR BLD AUTO: 0.2 % (ref 0.3–6.2)
ERYTHROCYTE [DISTWIDTH] IN BLOOD BY AUTOMATED COUNT: 16.3 % (ref 12.3–15.4)
FLUAV SUBTYP SPEC NAA+PROBE: NOT DETECTED
FLUBV RNA ISLT QL NAA+PROBE: NOT DETECTED
GLOBULIN UR ELPH-MCNC: 3.9 GM/DL
GLUCOSE SERPL-MCNC: 191 MG/DL (ref 65–99)
GLUCOSE UR STRIP-MCNC: NEGATIVE MG/DL
HCT VFR BLD AUTO: 19.9 % (ref 37.5–51)
HEMOCCULT STL QL IA: NEGATIVE
HGB BLD-MCNC: 6.6 G/DL (ref 13–17.7)
HGB UR QL STRIP.AUTO: ABNORMAL
HOLD SPECIMEN: NORMAL
HOLD SPECIMEN: NORMAL
HYALINE CASTS UR QL AUTO: ABNORMAL /LPF
HYPOCHROMIA BLD QL: NORMAL
IMM GRANULOCYTES # BLD AUTO: 0.12 10*3/MM3 (ref 0–0.05)
IMM GRANULOCYTES NFR BLD AUTO: 0.8 % (ref 0–0.5)
INR PPP: 1.26 (ref 0.86–1.15)
KETONES UR QL STRIP: ABNORMAL
LARGE PLATELETS: NORMAL
LEUKOCYTE ESTERASE UR QL STRIP.AUTO: ABNORMAL
LIPASE SERPL-CCNC: 25 U/L (ref 13–60)
LYMPHOCYTES # BLD AUTO: 0.51 10*3/MM3 (ref 0.7–3.1)
LYMPHOCYTES NFR BLD AUTO: 3.4 % (ref 19.6–45.3)
MCH RBC QN AUTO: 29.6 PG (ref 26.6–33)
MCHC RBC AUTO-ENTMCNC: 33.2 G/DL (ref 31.5–35.7)
MCV RBC AUTO: 89.2 FL (ref 79–97)
MICROCYTES BLD QL: NORMAL
MONOCYTES # BLD AUTO: 0.47 10*3/MM3 (ref 0.1–0.9)
MONOCYTES NFR BLD AUTO: 3.1 % (ref 5–12)
NEUTROPHILS NFR BLD AUTO: 13.82 10*3/MM3 (ref 1.7–7)
NEUTROPHILS NFR BLD AUTO: 92 % (ref 42.7–76)
NITRITE UR QL STRIP: NEGATIVE
NRBC BLD AUTO-RTO: 0 /100 WBC (ref 0–0.2)
OVALOCYTES BLD QL SMEAR: NORMAL
PH UR STRIP.AUTO: 7 [PH] (ref 5–8)
PLATELET # BLD AUTO: 68 10*3/MM3 (ref 140–450)
PMV BLD AUTO: 11.3 FL (ref 6–12)
POIKILOCYTOSIS BLD QL SMEAR: NORMAL
POTASSIUM SERPL-SCNC: 3.7 MMOL/L (ref 3.5–5.2)
PROT SERPL-MCNC: 8.1 G/DL (ref 6–8.5)
PROT UR QL STRIP: ABNORMAL
PROTHROMBIN TIME: 16.1 SECONDS (ref 11.8–14.9)
RBC # BLD AUTO: 2.23 10*6/MM3 (ref 4.14–5.8)
RBC # UR STRIP: ABNORMAL /HPF
REF LAB TEST METHOD: ABNORMAL
RH BLD: POSITIVE
RSV RNA NPH QL NAA+NON-PROBE: NOT DETECTED
SARS-COV-2 RNA RESP QL NAA+PROBE: NOT DETECTED
SMALL PLATELETS BLD QL SMEAR: NORMAL
SODIUM SERPL-SCNC: 140 MMOL/L (ref 136–145)
SP GR UR STRIP: 1.01 (ref 1–1.03)
SQUAMOUS #/AREA URNS HPF: ABNORMAL /HPF
T&S EXPIRATION DATE: NORMAL
UROBILINOGEN UR QL STRIP: ABNORMAL
WBC # UR STRIP: ABNORMAL /HPF
WBC MORPH BLD: NORMAL
WBC NRBC COR # BLD: 15.03 10*3/MM3 (ref 3.4–10.8)
WHOLE BLOOD HOLD COAG: NORMAL
WHOLE BLOOD HOLD SPECIMEN: NORMAL

## 2023-11-15 PROCEDURE — 85610 PROTHROMBIN TIME: CPT | Performed by: EMERGENCY MEDICINE

## 2023-11-15 PROCEDURE — 85025 COMPLETE CBC W/AUTO DIFF WBC: CPT | Performed by: EMERGENCY MEDICINE

## 2023-11-15 PROCEDURE — 86900 BLOOD TYPING SEROLOGIC ABO: CPT

## 2023-11-15 PROCEDURE — 74176 CT ABD & PELVIS W/O CONTRAST: CPT

## 2023-11-15 PROCEDURE — 86900 BLOOD TYPING SEROLOGIC ABO: CPT | Performed by: EMERGENCY MEDICINE

## 2023-11-15 PROCEDURE — 99222 1ST HOSP IP/OBS MODERATE 55: CPT | Performed by: INTERNAL MEDICINE

## 2023-11-15 PROCEDURE — 36415 COLL VENOUS BLD VENIPUNCTURE: CPT

## 2023-11-15 PROCEDURE — 99285 EMERGENCY DEPT VISIT HI MDM: CPT

## 2023-11-15 PROCEDURE — 87086 URINE CULTURE/COLONY COUNT: CPT | Performed by: INTERNAL MEDICINE

## 2023-11-15 PROCEDURE — 25810000003 SODIUM CHLORIDE 0.9 % SOLUTION: Performed by: EMERGENCY MEDICINE

## 2023-11-15 PROCEDURE — 36430 TRANSFUSION BLD/BLD COMPNT: CPT

## 2023-11-15 PROCEDURE — 87637 SARSCOV2&INF A&B&RSV AMP PRB: CPT | Performed by: EMERGENCY MEDICINE

## 2023-11-15 PROCEDURE — 83690 ASSAY OF LIPASE: CPT | Performed by: EMERGENCY MEDICINE

## 2023-11-15 PROCEDURE — 87040 BLOOD CULTURE FOR BACTERIA: CPT | Performed by: EMERGENCY MEDICINE

## 2023-11-15 PROCEDURE — 85007 BL SMEAR W/DIFF WBC COUNT: CPT | Performed by: EMERGENCY MEDICINE

## 2023-11-15 PROCEDURE — 86901 BLOOD TYPING SEROLOGIC RH(D): CPT | Performed by: EMERGENCY MEDICINE

## 2023-11-15 PROCEDURE — 82274 ASSAY TEST FOR BLOOD FECAL: CPT | Performed by: EMERGENCY MEDICINE

## 2023-11-15 PROCEDURE — 86923 COMPATIBILITY TEST ELECTRIC: CPT

## 2023-11-15 PROCEDURE — 25010000002 VANCOMYCIN 5 G RECONSTITUTED SOLUTION: Performed by: EMERGENCY MEDICINE

## 2023-11-15 PROCEDURE — P9040 RBC LEUKOREDUCED IRRADIATED: HCPCS

## 2023-11-15 PROCEDURE — 25010000002 CEFEPIME PER 500 MG: Performed by: EMERGENCY MEDICINE

## 2023-11-15 PROCEDURE — 81001 URINALYSIS AUTO W/SCOPE: CPT | Performed by: EMERGENCY MEDICINE

## 2023-11-15 PROCEDURE — 87147 CULTURE TYPE IMMUNOLOGIC: CPT | Performed by: INTERNAL MEDICINE

## 2023-11-15 PROCEDURE — 83605 ASSAY OF LACTIC ACID: CPT | Performed by: EMERGENCY MEDICINE

## 2023-11-15 PROCEDURE — 86850 RBC ANTIBODY SCREEN: CPT | Performed by: EMERGENCY MEDICINE

## 2023-11-15 PROCEDURE — 80053 COMPREHEN METABOLIC PANEL: CPT | Performed by: EMERGENCY MEDICINE

## 2023-11-15 PROCEDURE — 87186 SC STD MICRODIL/AGAR DIL: CPT | Performed by: INTERNAL MEDICINE

## 2023-11-15 RX ORDER — BISACODYL 5 MG/1
5 TABLET, DELAYED RELEASE ORAL DAILY PRN
Status: DISCONTINUED | OUTPATIENT
Start: 2023-11-15 | End: 2023-11-23 | Stop reason: HOSPADM

## 2023-11-15 RX ORDER — AMLODIPINE BESYLATE 5 MG/1
10 TABLET ORAL DAILY
Status: DISCONTINUED | OUTPATIENT
Start: 2023-11-16 | End: 2023-11-21

## 2023-11-15 RX ORDER — POLYETHYLENE GLYCOL 3350 17 G/17G
17 POWDER, FOR SOLUTION ORAL DAILY PRN
Status: DISCONTINUED | OUTPATIENT
Start: 2023-11-15 | End: 2023-11-23 | Stop reason: HOSPADM

## 2023-11-15 RX ORDER — LISINOPRIL 5 MG/1
5 TABLET ORAL DAILY
Status: DISCONTINUED | OUTPATIENT
Start: 2023-11-16 | End: 2023-11-21

## 2023-11-15 RX ORDER — ACETAMINOPHEN 325 MG/1
650 TABLET ORAL EVERY 4 HOURS PRN
Status: DISCONTINUED | OUTPATIENT
Start: 2023-11-15 | End: 2023-11-23 | Stop reason: HOSPADM

## 2023-11-15 RX ORDER — SODIUM CHLORIDE 0.9 % (FLUSH) 0.9 %
10 SYRINGE (ML) INJECTION AS NEEDED
Status: DISCONTINUED | OUTPATIENT
Start: 2023-11-15 | End: 2023-11-23 | Stop reason: HOSPADM

## 2023-11-15 RX ORDER — FLUTICASONE PROPIONATE 50 MCG
2 SPRAY, SUSPENSION (ML) NASAL DAILY PRN
Status: DISCONTINUED | OUTPATIENT
Start: 2023-11-15 | End: 2023-11-23 | Stop reason: HOSPADM

## 2023-11-15 RX ORDER — CETIRIZINE HYDROCHLORIDE 10 MG/1
10 TABLET ORAL DAILY
Status: DISCONTINUED | OUTPATIENT
Start: 2023-11-16 | End: 2023-11-23 | Stop reason: HOSPADM

## 2023-11-15 RX ORDER — BISACODYL 10 MG
10 SUPPOSITORY, RECTAL RECTAL DAILY PRN
Status: DISCONTINUED | OUTPATIENT
Start: 2023-11-15 | End: 2023-11-23 | Stop reason: HOSPADM

## 2023-11-15 RX ORDER — TRAZODONE HYDROCHLORIDE 50 MG/1
25 TABLET ORAL NIGHTLY
Status: DISCONTINUED | OUTPATIENT
Start: 2023-11-15 | End: 2023-11-23 | Stop reason: HOSPADM

## 2023-11-15 RX ORDER — ONDANSETRON 2 MG/ML
4 INJECTION INTRAMUSCULAR; INTRAVENOUS EVERY 6 HOURS PRN
Status: DISCONTINUED | OUTPATIENT
Start: 2023-11-15 | End: 2023-11-23 | Stop reason: HOSPADM

## 2023-11-15 RX ORDER — SODIUM CHLORIDE 0.9 % (FLUSH) 0.9 %
10 SYRINGE (ML) INJECTION EVERY 12 HOURS SCHEDULED
Status: DISCONTINUED | OUTPATIENT
Start: 2023-11-15 | End: 2023-11-23 | Stop reason: HOSPADM

## 2023-11-15 RX ORDER — ACETAMINOPHEN 325 MG/1
650 TABLET ORAL ONCE
Status: COMPLETED | OUTPATIENT
Start: 2023-11-15 | End: 2023-11-15

## 2023-11-15 RX ORDER — PANTOPRAZOLE SODIUM 40 MG/10ML
40 INJECTION, POWDER, LYOPHILIZED, FOR SOLUTION INTRAVENOUS ONCE
Status: COMPLETED | OUTPATIENT
Start: 2023-11-15 | End: 2023-11-15

## 2023-11-15 RX ORDER — NITROGLYCERIN 0.4 MG/1
0.4 TABLET SUBLINGUAL
Status: DISCONTINUED | OUTPATIENT
Start: 2023-11-15 | End: 2023-11-15

## 2023-11-15 RX ORDER — SODIUM CHLORIDE 9 MG/ML
40 INJECTION, SOLUTION INTRAVENOUS AS NEEDED
Status: DISCONTINUED | OUTPATIENT
Start: 2023-11-15 | End: 2023-11-23 | Stop reason: HOSPADM

## 2023-11-15 RX ORDER — NITROGLYCERIN 0.4 MG/1
0.4 TABLET SUBLINGUAL
Status: DISCONTINUED | OUTPATIENT
Start: 2023-11-15 | End: 2023-11-23 | Stop reason: HOSPADM

## 2023-11-15 RX ORDER — METOPROLOL SUCCINATE 25 MG/1
25 TABLET, EXTENDED RELEASE ORAL DAILY
Status: DISCONTINUED | OUTPATIENT
Start: 2023-11-16 | End: 2023-11-19 | Stop reason: SDUPTHER

## 2023-11-15 RX ORDER — AMOXICILLIN 250 MG
2 CAPSULE ORAL 2 TIMES DAILY
Status: DISCONTINUED | OUTPATIENT
Start: 2023-11-15 | End: 2023-11-23 | Stop reason: HOSPADM

## 2023-11-15 RX ORDER — ALUMINA, MAGNESIA, AND SIMETHICONE 2400; 2400; 240 MG/30ML; MG/30ML; MG/30ML
15 SUSPENSION ORAL EVERY 6 HOURS PRN
Status: DISCONTINUED | OUTPATIENT
Start: 2023-11-15 | End: 2023-11-23 | Stop reason: HOSPADM

## 2023-11-15 RX ADMIN — TRAZODONE HYDROCHLORIDE 25 MG: 50 TABLET ORAL at 23:33

## 2023-11-15 RX ADMIN — SODIUM CHLORIDE 1000 ML: 9 INJECTION, SOLUTION INTRAVENOUS at 15:04

## 2023-11-15 RX ADMIN — PANTOPRAZOLE SODIUM 40 MG: 40 INJECTION, POWDER, FOR SOLUTION INTRAVENOUS at 15:08

## 2023-11-15 RX ADMIN — VANCOMYCIN HYDROCHLORIDE 1750 MG: 500 INJECTION, POWDER, LYOPHILIZED, FOR SOLUTION INTRAVENOUS at 16:18

## 2023-11-15 RX ADMIN — ACETAMINOPHEN 650 MG: 325 TABLET ORAL at 21:28

## 2023-11-15 RX ADMIN — DOCUSATE SODIUM 50MG AND SENNOSIDES 8.6MG 2 TABLET: 8.6; 5 TABLET, FILM COATED ORAL at 23:33

## 2023-11-15 RX ADMIN — ACETAMINOPHEN 650 MG: 325 TABLET ORAL at 15:37

## 2023-11-15 RX ADMIN — Medication 10 ML: at 23:33

## 2023-11-15 RX ADMIN — CEFEPIME 2000 MG: 2 INJECTION, POWDER, FOR SOLUTION INTRAVENOUS at 15:37

## 2023-11-15 NOTE — ED PROVIDER NOTES
Time: 3:11 PM EST  Date of encounter:  11/15/2023  Independent Historian/Clinical History and Information was obtained by:   Patient and Nursing Staff    History is limited by: Dementia    Chief Complaint: Coffee-ground emesis, abdominal pain        History of Present Illness:  Patient is a 88 y.o. year old male with history of diabetes and dementia and heart disease now on aspirin and Plavix, who presents to the emergency department for evaluation of vomiting coffee-ground emesis today at nursing facility.    He does not recall any vomiting but it sounds like staff at his nursing facility saw him.    He denies any rectal bleeding or melena but does have a history of vascular dementia so poor historian noted.    He does have some midline periumbilical and lower abdominal pain and feels bloated.    He denies any diarrhea, but states he has been constipated the past couple days.    HPI    Patient Care Team  Primary Care Provider: Kayla Renee APRN    Past Medical History:     No Known Allergies  Past Medical History:   Diagnosis Date    Dementia     Diabetes mellitus     Hyperlipidemia     Hypertension     Renal disorder      Past Surgical History:   Procedure Laterality Date    CATARACT EXTRACTION      COLONOSCOPY      COLONOSCOPY N/A 10/12/2023    Procedure: COLONOSCOPY;  Surgeon: Renny Tong MD;  Location: [facility] ENDOSCOPY;  Service: Gastroenterology;  Laterality: N/A;  DIVERTICULOSIS    CORONARY ARTERY BYPASS GRAFT      CYSTOSCOPY      ENDOSCOPY N/A 9/20/2023    Procedure: ESOPHAGOGASTRODUODENOSCOPY WITH COLD BIOPSIES;  Surgeon: Renny Tong MD;  Location: [facility] ENDOSCOPY;  Service: Gastroenterology;  Laterality: N/A;  HIATAL HERNIA    PROSTATE SURGERY       Family History   Family history unknown: Yes       Home Medications:  Prior to Admission medications    Medication Sig Start Date End Date Taking? Authorizing Provider   amLODIPine (NORVASC) 10 MG tablet Take 1 tablet by mouth Daily.     Yahir Freeman MD   aspirin 81 MG EC tablet Take 1 tablet by mouth Daily.    Yahir Freeman MD   clopidogrel (PLAVIX) 75 MG tablet Take 1 tablet by mouth Daily. 9/26/23   Zoila Jung MD   Darbepoetin New 40 MCG/0.4ML solution prefilled syringe Inject 40 mcg under the skin into the appropriate area as directed Every 14 (Fourteen) Days.    Yahir Freeman MD   fexofenadine (ALLEGRA) 60 MG tablet Take 1 tablet by mouth Daily As Needed. 5/3/22   Yahir Freeman MD   fluticasone (FLONASE) 50 MCG/ACT nasal spray 2 sprays by Each Nare route Daily As Needed.    Yahir Freeman MD   hydrophilic ointment Apply 1 application  topically to the appropriate area as directed 2 (Two) Times a Day As Needed for Dry Skin.    Yahir Freeman MD   lidocaine (LIDODERM) 5 % Place 1 patch on the skin as directed by provider Daily As Needed for Mild Pain.    Yahir Freeman MD   lisinopril (PRINIVIL,ZESTRIL) 10 MG tablet Take 0.5 tablets by mouth Daily.    Yahir Freeman MD   metoprolol succinate XL (TOPROL-XL) 25 MG 24 hr tablet Take 1 tablet by mouth Daily for 30 days. 9/24/23 10/24/23  Zoila Jung MD   nitroglycerin (NITROSTAT) 0.4 MG SL tablet Take 1 tablet by mouth Every 5 (Five) Minutes As Needed.    Yahir Freeman MD   rosuvastatin (CRESTOR) 40 MG tablet Take 1 tablet by mouth Daily.    Yahir Freeman MD   traZODone (DESYREL) 50 MG tablet Take 0.5 tablets by mouth Every Night.    Yahir Freeman MD        Social History:   Social History     Tobacco Use    Smoking status: Former     Packs/day: 1     Types: Cigarettes     Quit date: 2013     Years since quitting: 10.8    Smokeless tobacco: Never   Vaping Use    Vaping Use: Never used   Substance Use Topics    Alcohol use: Not Currently    Drug use: Never         Review of Systems:  Review of Systems   I performed a 10 point review of systems which was all negative, except for the positives found in  "the HPI above.  Physical Exam:  /56   Pulse 98   Temp 100.4 °F (38 °C) (Oral)   Resp 16   Ht 180.3 cm (71\")   Wt 82.6 kg (182 lb 1.6 oz)   SpO2 97%   BMI 25.40 kg/m²     Physical Exam   General: Awake alert and in no obvious distress, with dried emesis around mouth    HEENT: Head normocephalic atraumatic, eyes PERRLA EOMI, nose normal, oropharynx normal.    Neck: Supple full range of motion, no meningismus, no lymphadenopathy    Heart: Tachycardic with a regular rhythm, no murmurs or rubs, 2+ radial pulses bilaterally    Lungs: Clear to auscultation bilaterally without wheezes or crackles, no respiratory distress    Abdomen: Soft, mildly tender throughout the periumbilical and lower abdomen but no pulsatile mass or guarding, no real tenderness in the right upper quadrant noted, nondistended, no rebound or guarding    Skin: Warm, dry, no rash    Musculoskeletal: Normal range of motion, no lower extremity edema    Neurologic: Oriented x3, no motor deficits no sensory deficits    Psychiatric: Mood appears stable, no psychosis          Procedures:  Procedures      Medical Decision Making:      Comorbidities that affect care:    Dementia, history of GI bleeding, history of coronary artery disease on aspirin and Plavix.    External Notes reviewed:    Previous Labs: I reviewed his baseline lab work showing low hemoglobins previously but this 1 is slightly lower than his baseline and he has a new leukocytosis.      The following orders were placed and all results were independently analyzed by me:  Orders Placed This Encounter   Procedures    Blood Culture - Blood,    Blood Culture - Blood,    COVID-19, FLU A/B, RSV PCR 1 HR TAT - Swab, Nasopharynx    CT Abdomen Pelvis Without Contrast    Howard Draw    Comprehensive Metabolic Panel    Lipase    Urinalysis With Microscopic If Indicated (No Culture) - Urine, Clean Catch    Lactic Acid, Plasma    CBC Auto Differential    Scan Slide    Protime-INR    STAT Lactic " Acid, Reflex    Urinalysis, Microscopic Only - Urine, Clean Catch    Occult Blood, Fecal By Immunoassay - Stool, Per Rectum    NPO Diet NPO Type: Strict NPO    Undress & Gown    Undress & Gown    Continuous Pulse Oximetry    Vital Signs    Verify Informed Consent for Blood Product Administration    Straight cath    Soap suds enema    Surgery (on-call MD unless specified)    Gastroenterology (on-call MD unless specified)    IP General Consult (Use specialty-specific consult if known)    Oxygen Therapy- Nasal Cannula; Titrate 1-6 LPM Per SpO2; 90 - 95%    Type & Screen    Prepare RBC, 2 Units    Insert Peripheral IV    Insert Peripheral IV    Insert Peripheral IV    Inpatient Admission    CBC & Differential    Green Top (Gel)    Lavender Top    Gold Top - SST    Light Blue Top       Medications Given in the Emergency Department:  Medications   sodium chloride 0.9 % flush 10 mL (has no administration in time range)   sodium chloride 0.9 % flush 10 mL (has no administration in time range)   sodium chloride 0.9 % flush 10 mL (has no administration in time range)   vancomycin 1750 mg/500 mL 0.9% NS IVPB ([facility]) (1,750 mg Intravenous New Bag 11/15/23 1618)   sodium chloride 0.9 % bolus 1,000 mL (0 mL Intravenous Stopped 11/15/23 1618)   pantoprazole (PROTONIX) injection 40 mg (40 mg Intravenous Given 11/15/23 1508)   acetaminophen (TYLENOL) tablet 650 mg (650 mg Oral Given 11/15/23 1537)   cefepime (MAXIPIME) 2000 mg/100 mL 0.9% NS (mbp) (0 mg Intravenous Stopped 11/15/23 1618)        ED Course:         Labs:    Lab Results (last 24 hours)       Procedure Component Value Units Date/Time    CBC & Differential [[id number]]  (Abnormal) Collected: 11/15/23 1410    Specimen: Blood Updated: 11/15/23 1452    Narrative:      The following orders were created for panel order CBC & Differential.  Procedure                               Abnormality         Status                     ---------                               -----------          ------                     CBC Auto Differential[[id number]]        Abnormal            Final result               Scan Slide[[id number]]                                       Final result                 Please view results for these tests on the individual orders.    Comprehensive Metabolic Panel [[id number]]  (Abnormal) Collected: 11/15/23 1410    Specimen: Blood Updated: 11/15/23 1443     Glucose 191 mg/dL      BUN 19 mg/dL      Creatinine 2.69 mg/dL      Sodium 140 mmol/L      Potassium 3.7 mmol/L      Comment: Slight hemolysis detected by analyzer. Result may be falsely elevated.        Chloride 105 mmol/L      CO2 20.3 mmol/L      Calcium 9.1 mg/dL      Total Protein 8.1 g/dL      Albumin 4.2 g/dL      ALT (SGPT) 11 U/L      AST (SGOT) 23 U/L      Alkaline Phosphatase 98 U/L      Total Bilirubin 1.5 mg/dL      Globulin 3.9 gm/dL      A/G Ratio 1.1 g/dL      BUN/Creatinine Ratio 7.1     Anion Gap 14.7 mmol/L      eGFR 22.1 mL/min/1.73     Narrative:      GFR Normal >60  Chronic Kidney Disease <60  Kidney Failure <15    The GFR formula is only valid for adults with stable renal function between ages 18 and 70.    Lipase [[id number]]  (Normal) Collected: 11/15/23 1410    Specimen: Blood Updated: 11/15/23 1443     Lipase 25 U/L     Lactic Acid, Plasma [[id number]]  (Abnormal) Collected: 11/15/23 1410    Specimen: Blood Updated: 11/15/23 1457     Lactate 2.1 mmol/L     CBC Auto Differential [[id number]]  (Abnormal) Collected: 11/15/23 1410    Specimen: Blood Updated: 11/15/23 1452     WBC 15.03 10*3/mm3      RBC 2.23 10*6/mm3      Hemoglobin 6.6 g/dL      Hematocrit 19.9 %      MCV 89.2 fL      MCH 29.6 pg      MCHC 33.2 g/dL      RDW 16.3 %      RDW-SD 51.9 fl      MPV 11.3 fL      Platelets 68 10*3/mm3      Neutrophil % 92.0 %      Lymphocyte % 3.4 %      Monocyte % 3.1 %      Eosinophil % 0.2 %      Basophil % 0.5 %      Immature Grans % 0.8 %      Neutrophils, Absolute 13.82 10*3/mm3      Lymphocytes, Absolute  0.51 10*3/mm3      Monocytes, Absolute 0.47 10*3/mm3      Eosinophils, Absolute 0.03 10*3/mm3      Basophils, Absolute 0.08 10*3/mm3      Immature Grans, Absolute 0.12 10*3/mm3      nRBC 0.0 /100 WBC     Blood Culture - Blood, Arm, Right [[id number]] Collected: 11/15/23 1410    Specimen: Blood from Arm, Right Updated: 11/15/23 1416    Scan Slide [[id number]] Collected: 11/15/23 1410    Specimen: Blood Updated: 11/15/23 1452     Anisocytosis Slight/1+     Hypochromia Slight/1+     Microcytes Slight/1+     Ovalocytes Slight/1+     Poikilocytes Slight/1+     WBC Morphology Normal     Platelet Estimate Decreased     Large Platelets Slight/1+    Protime-INR [[id number]]  (Abnormal) Collected: 11/15/23 1410    Specimen: Blood Updated: 11/15/23 1511     Protime 16.1 Seconds      INR 1.26    Narrative:      Suggested Therapeutic Ranges For Oral Anticoagulant Therapy:  Level of Therapy                      INR Target Range  Standard Dose                            2.0-3.0  High Dose                                2.5-3.5  Patients not receiving anticoagulant  Therapy Normal Range                     0.86-1.15    Blood Culture - Blood, Arm, Left [[id number]] Collected: 11/15/23 1427    Specimen: Blood from Arm, Left Updated: 11/15/23 1432    COVID-19, FLU A/B, RSV PCR 1 HR TAT - Swab, Nasopharynx [[id number]]  (Normal) Collected: 11/15/23 1544    Specimen: Swab from Nasopharynx Updated: 11/15/23 1622     COVID19 Not Detected     Influenza A PCR Not Detected     Influenza B PCR Not Detected     RSV, PCR Not Detected    Narrative:      Fact sheet for providers: https://www.fda.gov/media/547435/download    Fact sheet for patients: https://www.fda.gov/media/653275/download    Test performed by PCR.    Urinalysis With Microscopic If Indicated (No Culture) - Urine, Clean Catch [[id number]]  (Abnormal) Collected: 11/15/23 1604    Specimen: Urine, Clean Catch Updated: 11/15/23 1622     Color, UA Yellow     Appearance, UA Turbid     pH, UA  7.0     Specific Gravity, UA 1.014     Glucose, UA Negative     Ketones, UA Trace     Bilirubin, UA Negative     Blood, UA Moderate (2+)     Protein, UA >=300 mg/dL (3+)     Leuk Esterase, UA Large (3+)     Nitrite, UA Negative     Urobilinogen, UA 2.0 E.U./dL    Urinalysis, Microscopic Only - Urine, Clean Catch [[id number]]  (Abnormal) Collected: 11/15/23 1604    Specimen: Urine, Clean Catch Updated: 11/15/23 1628     RBC, UA       Unable to determine due to loaded field     /HPF     WBC, UA Too Numerous to Count /HPF      Bacteria, UA 3+ /HPF      Squamous Epithelial Cells, UA       Unable to determine due to loaded field     /HPF     Hyaline Casts, UA       Unable to determine due to loaded field     /LPF     Methodology Manual Light Microscopy             Imaging:    CT Abdomen Pelvis Without Contrast    Result Date: 11/15/2023  PROCEDURE: CT ABDOMEN PELVIS WO CONTRAST  COMPARISON: [facility], CT, ABDOMEN-PELVIS W, 12/24/2006, 16:50.  [facility], CT, CT ABDOMEN PELVIS WO CONTRAST, 9/20/2023, 21:45.  [facility], CT, CT CHEST WO CONTRAST DIAGNOSTIC, 9/20/2023, 21:45.  INDICATIONS: Eval mid abdominal pain, GI bleed, fever  PROTOCOL:   Standard imaging protocol performed    RADIATION:   DLP: 467.1mGy*cm   Automated exposure control was utilized to minimize radiation dose.  TECHNIQUE: Axial images of the abdomen and pelvis without intravenous or oral contrast.  FINDINGS:  ABDOMEN: There is a minimal left pleural effusion.  The visualized heart is enlarged.  Endobronchial secretion is present in lower lobes.  There is a moderate hiatal hernia.  Questionable pericholecystic inflammatory change.  Cholelithiasis.  The unenhanced liver, spleen, pancreas and adrenal glands are normal.  There is atrophy of the left kidney.  Cysts are present in the right kidney.  There is a 1 cm right renal cyst with coarse rim calcification.  PELVIS: No evidence of bowel obstruction, perforation or  abscess.  There is a large amount of stool in the colon and rectum.  Stool in the rectum measures 9.5 cm in greatest AP dimension.  There is a fat containing left inguinal hernia.  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  Small amount of pelvic free fluid.  Chronic distention of the urinary bladder likely secondary to chronic bladder outlet obstruction.  The abdominal aorta has a normal caliber.  Atherosclerotic disease is present.  Colonic diverticulosis is present.  IMPRESSION:   1. Cholelithiasis.  Questionable pericholecystic inflammatory change.  Suggest correlation with any history of acute cholecystitis.  2. Minimal left pleural effusion.  Endobronchial secretion in the lower lobes possibly secondary to mucous plugging or bronchitis.  Suggest correlation with any history of aspiration.  3. Small amount of pelvic free fluid.  4. Large amount of stool throughout the colon and rectum.  Stool in the rectum measures 9.5 cm in greatest AP dimension.   ROSA DA SILVA MD       Electronically Signed and Approved By: ROSA DA SILVA MD on 11/15/2023 at 16:06                Differential Diagnosis and Discussion:    Fever: Based on the complaint of fever, differential diagnosis includes but is not limited to meningitis, pneumonia, pyelonephritis, acute uti,  systemic immune response syndrome, sepsis, viral syndrome, fungal infection, tick born illness and other bacterial infections.  GI Bleeding: Differential diagnosis includes but is not limited to gastritis, gastric ulcer, stress ulcer, duodenitis, Laura-Crenshaw tears, esophageal varices, angiodysplasia, aortic enteric fistula, hematologic issues including thrombocytopenia, GI neoplasm, ulcerative colitis, Crohn's disease, diverticulosis, diverticulitis, hemorrhoids, aortic aneurysm, and polyps    All labs were reviewed and interpreted by me.  All X-rays impressions were independently interpreted by me.  CT scan radiology impression was  "interpreted by me.    MDM     Amount and/or Complexity of Data Reviewed  Clinical lab tests: reviewed  Tests in the radiology section of CPT®: reviewed  Decide to obtain previous medical records or to obtain history from someone other than the patient: yes           This patient is a 88-year-old male with history of aspirin and Plavix now presenting for \"coffee-ground emesis\" seen at nursing facility today by staff.    His hemoglobin is low at 6.6 today but baseline is 6.8 from earlier this month.    We are awaiting results of Hemoccult but I will go ahead and send off type and screen and plan to transfuse him 2 units of blood and start IV Protonix and some fluids initially.    He also was noted to have a fever of 100.4 Fahrenheit and an elevated white blood cell count of 15 in the setting of nitrite positive UTI, so I am giving him IV broad-spectrum antibiotics for sepsis criteria, including cefepime and vancomycin and sending off cultures.    I have consulted with her gastroenterologist to see him for possible GI bleed and also with our general surgeon regarding CT read of cholelithiasis and questionable cholecystitis, however he is not particularly tender in the right upper quadrant, but overall poor historian due to vascular dementia.    We will admit him to the hospital.      **I recognized sepsis at 15:20 p.m. and started IV broad-spectrum antibiotics and cultures and fluids.**      Critical Care Note: Total Critical Care time of 35 minutes. Total critical care time documented does not include time spent on separately billed procedures for services of nurses or physician assistants. I personally saw and examined the patient. I have reviewed all diagnostic interpretations and treatment plans as written. I was present for the key portions of any procedures performed and the inclusive time noted in any critical care statement. Critical care time includes patient management by me, time spent at the patients " bedside,  time to review lab and imaging results, discussing patient care, documentation in the medical record, and time spent with family or caregiver.        Patient Care Considerations:          Consultants/Shared Management Plan:    Consultant: I have discussed the case with the on-call gastroenterologist and general surgeon, who agrees to consult on the patient.  PCP: I have discussed the case with the patient's on-call primary provider, who agrees to accept the patient for admission.    Social Determinants of Health:    Patient is a nursing home/assisted living resident and has reliable access to care.      Disposition and Care Coordination:    Admit:   Through independent evaluation of the patient's history, physical, and imperical data, the patient meets criteria for observation/admission to the hospital.        Final diagnoses:   Acute on chronic anemia   Acute UTI (urinary tract infection)   Sepsis without acute organ dysfunction, due to unspecified organism   Acute upper GI bleed   Constipation, unspecified constipation type   Calculus of gallbladder without cholecystitis without obstruction        ED Disposition       ED Disposition   Decision to Admit    Condition   --    Comment   Level of Care: Progressive Care [20]   Diagnosis: Sepsis [5864710]   Admitting Physician: CARMELO HAMPTON [[id number]]   Attending Physician: CARMELO HAMPTON [[id number]]   Isolate for COVID?: No [0]   Certification: I Certify That Inpatient Hospital Services Are Medically Necessary For Greater Than 2 Midnights                 This medical record created using voice recognition software.             Jose Marinelli MD  11/15/23 3701

## 2023-11-15 NOTE — H&P
[facility]   HISTORY AND PHYSICAL    Patient Name: Selvin Ferguson  : 1935  MRN: [id number]  Primary Care Physician:  Kayla Renee APRN  Date of admission: 11/15/2023    Subjective   Subjective     Chief Complaint: Known to me with history of dementia myelodysplastic syndrome recurrent GI bleeding was admitted at this time because of severe anemia and he was vomiting coffee-ground material in the nursing home where he was at this time patient was discharged about 2 weeks ago from the hospital after having received several transfusions he has had a colonoscopy but patient has not been able to cooperate in getting any of the test done    HPI: Patient with history of dementia as well as cardiac issues    Selvin Ferguson is a 88 y.o. male patient with cardiac issues dementia    Review of Systems   All systems were reviewed and negative except for: Reviewed    Personal History     Past Medical History:   Diagnosis Date    Dementia     Diabetes mellitus     Hyperlipidemia     Hypertension     Renal disorder        Past Surgical History:   Procedure Laterality Date    CATARACT EXTRACTION      COLONOSCOPY      COLONOSCOPY N/A 10/12/2023    Procedure: COLONOSCOPY;  Surgeon: Renny Tong MD;  Location: [facility] ENDOSCOPY;  Service: Gastroenterology;  Laterality: N/A;  DIVERTICULOSIS    CORONARY ARTERY BYPASS GRAFT      CYSTOSCOPY      ENDOSCOPY N/A 2023    Procedure: ESOPHAGOGASTRODUODENOSCOPY WITH COLD BIOPSIES;  Surgeon: Renny Tong MD;  Location: [facility] ENDOSCOPY;  Service: Gastroenterology;  Laterality: N/A;  HIATAL HERNIA    PROSTATE SURGERY         Family History: Family history is unknown by patient. Otherwise pertinent FHx was reviewed and not pertinent to current issue.    Social History:  reports that he quit smoking about 10 years ago. His smoking use included cigarettes. He smoked an average of 1 pack per day. He has never used smokeless tobacco. He reports that he does  not currently use alcohol. He reports that he does not use drugs.    Home Medications:  Darbepoetin New, amLODIPine, aspirin, clopidogrel, fexofenadine, fluticasone, hydrophilic ointment, lidocaine, lisinopril, metoprolol succinate XL, nitroglycerin, rosuvastatin, and traZODone      Allergies:  No Known Allergies    Objective   Objective     Vitals:   Temp:  [99.1 °F (37.3 °C)-100.4 °F (38 °C)] 99.1 °F (37.3 °C)  Heart Rate:  [] 95  Resp:  [16-25] 24  BP: (124-136)/(53-62) 124/62  Physical Exam    Constitutional: Alert and oriented but has.'s of confusion with history of dementia   Eyes: Pupils equal reacting to light and accommodation   HENT: Normal   Neck: Normal   Respiratory: No rales or rhonchi   Cardiovascular: S1-S2 normal no S3 or S4   Gastrointestinal: Tenderness right lower quadrant of the abdomen   Musculoskeletal: Normal   Neurologic: No localizing signs  Skin: No rash    Result Review    Result Review:  I have personally reviewed the results from the time of this admission to 11/15/2023 18:52 EST and agree with these findings:  [x]  Laboratory  []  Microbiology  []  Radiology  []  EKG/Telemetry   []  Cardiology/Vascular   []  Pathology  [x]  Old records  []  Other:  Most notable findings include: Patient with dementia and severe anemia patient with anemia and dementia transfusion upper GI consultation surgical consultation    Assessment & Plan   Assessment / Plan     Brief Patient Summary:  Selvin Ferguson is a 88 y.o. male who patient with dementia GI consultation    Active Hospital Problems:  Active Hospital Problems    Diagnosis     **Sepsis        Plan:   Transfusion of blood antibiotics    DVT prophylaxis:  No DVT prophylaxis order currently exists.    CODE STATUS:         Admission Status:  I believe this patient meets inpatient status.    Electronically signed by Tristin Reece MD, 11/15/23, 6:52 PM EST.      Part of this note may be an electronic transcription/translation of spoken  language to printed text using the Dragon Dictation System.

## 2023-11-15 NOTE — CONSULTS
[facility]  Initial Inpatient Consult Note    Referring Provider: ER    Reason for Consultation: coffee ground emesis    Subjective     History of present illness:    88 y.o. male with history of HTN, HLD, DM, and dementia who presents from nursing facility with coffee ground emesis.  Pt reports associated upper abd pain.  No melena, hematochezia.  Wife reports pt has been getting periodic transfusions.  Hgb 6.6 on presentation.  Hgb 6.8 about 3 weeks ago.  EGD 9/20/23 with Dr. Tong showed a hiatal hernia.      Past Medical History:  Past Medical History:   Diagnosis Date    Dementia     Diabetes mellitus     Hyperlipidemia     Hypertension     Renal disorder      Past Surgical History:  Past Surgical History:   Procedure Laterality Date    CATARACT EXTRACTION      COLONOSCOPY      COLONOSCOPY N/A 10/12/2023    Procedure: COLONOSCOPY;  Surgeon: Renny Tong MD;  Location: [facility] ENDOSCOPY;  Service: Gastroenterology;  Laterality: N/A;  DIVERTICULOSIS    CORONARY ARTERY BYPASS GRAFT      CYSTOSCOPY      ENDOSCOPY N/A 9/20/2023    Procedure: ESOPHAGOGASTRODUODENOSCOPY WITH COLD BIOPSIES;  Surgeon: Renny Tong MD;  Location: [facility] ENDOSCOPY;  Service: Gastroenterology;  Laterality: N/A;  HIATAL HERNIA    PROSTATE SURGERY        Social History:   Social History     Tobacco Use    Smoking status: Former     Packs/day: 1     Types: Cigarettes     Quit date: 2013     Years since quitting: 10.8    Smokeless tobacco: Never   Substance Use Topics    Alcohol use: Not Currently      Family History:  Family History   Family history unknown: Yes       Home Meds:  (Not in a hospital admission)    Current Meds:   vancomycin, 20 mg/kg, Intravenous, Once      Allergies:  No Known Allergies  Review of Systems  Pertinent items are noted in HPI, all other systems reviewed and negative     Objective     Vital Signs  Temp:  [100.4 °F (38 °C)] 100.4 °F (38 °C)  Heart Rate:  [] 98  Resp:   [16] 16  BP: (134-136)/(56) 136/56  Physical Exam:  General Appearance:    Alert, cooperative, in no acute distress   Head:    Normocephalic, without obvious abnormality, atraumatic   Eyes:          conjunctivae and sclerae normal, no   icterus   Throat:   no thrush, oral mucosa moist   Neck:   Supple, no adenopathy   Lungs:     Breathing unlabored    Heart:    No chest tenderness    Chest Wall:    No abnormalities observed   Abdomen:     Soft, nondistended, mild upper abd TTP   Extremities:   no edema, no redness   Skin:   No bruising or rash   Psychiatric:  normal mood and insight     Results Review:   I reviewed the patient's new clinical results.    Results from last 7 days   Lab Units 11/15/23  1410   WBC 10*3/mm3 15.03*   HEMOGLOBIN g/dL 6.6*   HEMATOCRIT % 19.9*   PLATELETS 10*3/mm3 68*     Results from last 7 days   Lab Units 11/15/23  1410   SODIUM mmol/L 140   POTASSIUM mmol/L 3.7   CHLORIDE mmol/L 105   CO2 mmol/L 20.3*   BUN mg/dL 19   CREATININE mg/dL 2.69*   CALCIUM mg/dL 9.1   BILIRUBIN mg/dL 1.5*   ALK PHOS U/L 98   ALT (SGPT) U/L 11   AST (SGOT) U/L 23   GLUCOSE mg/dL 191*     Results from last 7 days   Lab Units 11/15/23  1410   INR  1.26*     Lab Results   Lab Value Date/Time    LIPASE 25 11/15/2023 1410    LIPASE 75 (H) 05/13/2022 1719       Radiology:  CT Abdomen Pelvis Without Contrast   Final Result          Assessment & Plan     * No active hospital problems. *       Assessment:  Acute blood loss anemia  Coffee ground emesis    Plan:  Agree with PPI IV  Will proceed with EGD tomorrow for evaluation  Benefits vs risks of procedure d/w patient and wife; risks include but are not limited to bleeding, infection, perforation, and risk of sedation  Pt and wife express understanding and agree to proceed      I discussed the patients findings and my recommendations with patient, family, and consulting provider.    Ashley Perales MD

## 2023-11-16 ENCOUNTER — ANESTHESIA EVENT (OUTPATIENT)
Dept: GASTROENTEROLOGY | Facility: HOSPITAL | Age: 88
End: 2023-11-16
Payer: MEDICARE

## 2023-11-16 ENCOUNTER — ANESTHESIA (OUTPATIENT)
Dept: GASTROENTEROLOGY | Facility: HOSPITAL | Age: 88
End: 2023-11-16
Payer: MEDICARE

## 2023-11-16 PROBLEM — K92.2 ACUTE UPPER GI BLEED: Status: ACTIVE | Noted: 2023-11-15

## 2023-11-16 LAB
ALBUMIN SERPL-MCNC: 4.1 G/DL (ref 3.5–5.2)
ALBUMIN/GLOB SERPL: 1.1 G/DL
ALP SERPL-CCNC: 97 U/L (ref 39–117)
ALT SERPL W P-5'-P-CCNC: 12 U/L (ref 1–41)
ANION GAP SERPL CALCULATED.3IONS-SCNC: 13.9 MMOL/L (ref 5–15)
AST SERPL-CCNC: 25 U/L (ref 1–40)
BASOPHILS # BLD AUTO: 0.16 10*3/MM3 (ref 0–0.2)
BASOPHILS NFR BLD AUTO: 0.6 % (ref 0–1.5)
BILIRUB SERPL-MCNC: 3.2 MG/DL (ref 0–1.2)
BUN SERPL-MCNC: 19 MG/DL (ref 8–23)
BUN/CREAT SERPL: 7.6 (ref 7–25)
CALCIUM SPEC-SCNC: 8.8 MG/DL (ref 8.6–10.5)
CHLORIDE SERPL-SCNC: 108 MMOL/L (ref 98–107)
CO2 SERPL-SCNC: 19.1 MMOL/L (ref 22–29)
CREAT SERPL-MCNC: 2.5 MG/DL (ref 0.76–1.27)
DEPRECATED RDW RBC AUTO: 48.5 FL (ref 37–54)
DEPRECATED RDW RBC AUTO: 50.8 FL (ref 37–54)
EGFRCR SERPLBLD CKD-EPI 2021: 24.1 ML/MIN/1.73
EOSINOPHIL # BLD AUTO: 0 10*3/MM3 (ref 0–0.4)
EOSINOPHIL NFR BLD AUTO: 0 % (ref 0.3–6.2)
ERYTHROCYTE [DISTWIDTH] IN BLOOD BY AUTOMATED COUNT: 15.9 % (ref 12.3–15.4)
ERYTHROCYTE [DISTWIDTH] IN BLOOD BY AUTOMATED COUNT: 16.5 % (ref 12.3–15.4)
FERRITIN SERPL-MCNC: 2778 NG/ML (ref 30–400)
FOLATE SERPL-MCNC: 4.36 NG/ML (ref 4.78–24.2)
GLOBULIN UR ELPH-MCNC: 3.7 GM/DL
GLUCOSE BLDC GLUCOMTR-MCNC: 153 MG/DL (ref 70–99)
GLUCOSE SERPL-MCNC: 172 MG/DL (ref 65–99)
HCT VFR BLD AUTO: 30.6 % (ref 37.5–51)
HCT VFR BLD AUTO: 31.3 % (ref 37.5–51)
HCYS SERPL-MCNC: 11.7 UMOL/L (ref 0–15)
HGB BLD-MCNC: 10.4 G/DL (ref 13–17.7)
HGB BLD-MCNC: 10.4 G/DL (ref 13–17.7)
IMM GRANULOCYTES # BLD AUTO: 0.21 10*3/MM3 (ref 0–0.05)
IMM GRANULOCYTES NFR BLD AUTO: 0.8 % (ref 0–0.5)
INR PPP: 1.46 (ref 0.86–1.15)
IRON 24H UR-MRATE: 45 MCG/DL (ref 59–158)
IRON SATN MFR SERPL: 19 % (ref 20–50)
LYMPHOCYTES # BLD AUTO: 1.02 10*3/MM3 (ref 0.7–3.1)
LYMPHOCYTES NFR BLD AUTO: 3.9 % (ref 19.6–45.3)
MCH RBC QN AUTO: 29.5 PG (ref 26.6–33)
MCH RBC QN AUTO: 29.7 PG (ref 26.6–33)
MCHC RBC AUTO-ENTMCNC: 33.2 G/DL (ref 31.5–35.7)
MCHC RBC AUTO-ENTMCNC: 34 G/DL (ref 31.5–35.7)
MCV RBC AUTO: 87.4 FL (ref 79–97)
MCV RBC AUTO: 88.7 FL (ref 79–97)
MONOCYTES # BLD AUTO: 0.54 10*3/MM3 (ref 0.1–0.9)
MONOCYTES NFR BLD AUTO: 2.1 % (ref 5–12)
NEUTROPHILS NFR BLD AUTO: 24.31 10*3/MM3 (ref 1.7–7)
NEUTROPHILS NFR BLD AUTO: 92.6 % (ref 42.7–76)
NRBC BLD AUTO-RTO: 0 /100 WBC (ref 0–0.2)
PLATELET # BLD AUTO: 59 10*3/MM3 (ref 140–450)
PLATELET # BLD AUTO: 60 10*3/MM3 (ref 140–450)
PMV BLD AUTO: 10.5 FL (ref 6–12)
PMV BLD AUTO: 11.9 FL (ref 6–12)
POTASSIUM SERPL-SCNC: 3.4 MMOL/L (ref 3.5–5.2)
PROT SERPL-MCNC: 7.8 G/DL (ref 6–8.5)
PROTHROMBIN TIME: 18 SECONDS (ref 11.8–14.9)
RBC # BLD AUTO: 3.5 10*6/MM3 (ref 4.14–5.8)
RBC # BLD AUTO: 3.53 10*6/MM3 (ref 4.14–5.8)
RETICS # AUTO: 0.03 10*6/MM3 (ref 0.02–0.13)
RETICS/RBC NFR AUTO: 0.86 % (ref 0.7–1.9)
SODIUM SERPL-SCNC: 141 MMOL/L (ref 136–145)
TIBC SERPL-MCNC: 241 MCG/DL (ref 298–536)
TRANSFERRIN SERPL-MCNC: 162 MG/DL (ref 200–360)
VANCOMYCIN SERPL-MCNC: 12.6 MCG/ML (ref 5–40)
VIT B12 BLD-MCNC: >2000 PG/ML (ref 211–946)
WBC NRBC COR # BLD: 26.24 10*3/MM3 (ref 3.4–10.8)
WBC NRBC COR # BLD: 26.88 10*3/MM3 (ref 3.4–10.8)

## 2023-11-16 PROCEDURE — 82746 ASSAY OF FOLIC ACID SERUM: CPT | Performed by: INTERNAL MEDICINE

## 2023-11-16 PROCEDURE — 25010000002 PROPOFOL 10 MG/ML EMULSION: Performed by: NURSE ANESTHETIST, CERTIFIED REGISTERED

## 2023-11-16 PROCEDURE — 82607 VITAMIN B-12: CPT | Performed by: INTERNAL MEDICINE

## 2023-11-16 PROCEDURE — 82948 REAGENT STRIP/BLOOD GLUCOSE: CPT

## 2023-11-16 PROCEDURE — 25810000003 SODIUM CHLORIDE 0.9 % SOLUTION: Performed by: INTERNAL MEDICINE

## 2023-11-16 PROCEDURE — 85045 AUTOMATED RETICULOCYTE COUNT: CPT | Performed by: INTERNAL MEDICINE

## 2023-11-16 PROCEDURE — 25010000002 NA FERRIC GLUC CPLX PER 12.5 MG: Performed by: INTERNAL MEDICINE

## 2023-11-16 PROCEDURE — 25010000002 ONDANSETRON PER 1 MG: Performed by: INTERNAL MEDICINE

## 2023-11-16 PROCEDURE — 25010000002 CEFEPIME PER 500 MG: Performed by: INTERNAL MEDICINE

## 2023-11-16 PROCEDURE — 84466 ASSAY OF TRANSFERRIN: CPT | Performed by: INTERNAL MEDICINE

## 2023-11-16 PROCEDURE — 99222 1ST HOSP IP/OBS MODERATE 55: CPT | Performed by: NURSE PRACTITIONER

## 2023-11-16 PROCEDURE — 44360 SMALL BOWEL ENDOSCOPY: CPT | Performed by: INTERNAL MEDICINE

## 2023-11-16 PROCEDURE — 83090 ASSAY OF HOMOCYSTEINE: CPT | Performed by: INTERNAL MEDICINE

## 2023-11-16 PROCEDURE — 85610 PROTHROMBIN TIME: CPT | Performed by: INTERNAL MEDICINE

## 2023-11-16 PROCEDURE — 25810000003 LACTATED RINGERS PER 1000 ML

## 2023-11-16 PROCEDURE — 85025 COMPLETE CBC W/AUTO DIFF WBC: CPT | Performed by: INTERNAL MEDICINE

## 2023-11-16 PROCEDURE — 83540 ASSAY OF IRON: CPT | Performed by: INTERNAL MEDICINE

## 2023-11-16 PROCEDURE — 80053 COMPREHEN METABOLIC PANEL: CPT | Performed by: INTERNAL MEDICINE

## 2023-11-16 PROCEDURE — 97161 PT EVAL LOW COMPLEX 20 MIN: CPT

## 2023-11-16 PROCEDURE — 25010000002 VANCOMYCIN 5 G RECONSTITUTED SOLUTION: Performed by: INTERNAL MEDICINE

## 2023-11-16 PROCEDURE — 36415 COLL VENOUS BLD VENIPUNCTURE: CPT | Performed by: INTERNAL MEDICINE

## 2023-11-16 PROCEDURE — 85027 COMPLETE CBC AUTOMATED: CPT | Performed by: INTERNAL MEDICINE

## 2023-11-16 PROCEDURE — 82728 ASSAY OF FERRITIN: CPT | Performed by: INTERNAL MEDICINE

## 2023-11-16 PROCEDURE — 80202 ASSAY OF VANCOMYCIN: CPT | Performed by: INTERNAL MEDICINE

## 2023-11-16 PROCEDURE — 0DJ08ZZ INSPECTION OF UPPER INTESTINAL TRACT, VIA NATURAL OR ARTIFICIAL OPENING ENDOSCOPIC: ICD-10-PCS | Performed by: INTERNAL MEDICINE

## 2023-11-16 PROCEDURE — 25010000002 EPOETIN ALFA PER 1000 UNITS: Performed by: INTERNAL MEDICINE

## 2023-11-16 RX ORDER — PROPOFOL 10 MG/ML
VIAL (ML) INTRAVENOUS AS NEEDED
Status: DISCONTINUED | OUTPATIENT
Start: 2023-11-16 | End: 2023-11-16 | Stop reason: SURG

## 2023-11-16 RX ORDER — PHENYLEPHRINE HCL IN 0.9% NACL 1 MG/10 ML
SYRINGE (ML) INTRAVENOUS AS NEEDED
Status: DISCONTINUED | OUTPATIENT
Start: 2023-11-16 | End: 2023-11-16 | Stop reason: SURG

## 2023-11-16 RX ORDER — SODIUM CHLORIDE, SODIUM LACTATE, POTASSIUM CHLORIDE, CALCIUM CHLORIDE 600; 310; 30; 20 MG/100ML; MG/100ML; MG/100ML; MG/100ML
30 INJECTION, SOLUTION INTRAVENOUS CONTINUOUS
Status: DISCONTINUED | OUTPATIENT
Start: 2023-11-16 | End: 2023-11-16

## 2023-11-16 RX ORDER — LIDOCAINE HYDROCHLORIDE 20 MG/ML
INJECTION, SOLUTION EPIDURAL; INFILTRATION; INTRACAUDAL; PERINEURAL AS NEEDED
Status: DISCONTINUED | OUTPATIENT
Start: 2023-11-16 | End: 2023-11-16 | Stop reason: SURG

## 2023-11-16 RX ADMIN — Medication 200 MCG: at 10:19

## 2023-11-16 RX ADMIN — PROPOFOL 75 MCG/KG/MIN: 10 INJECTION, EMULSION INTRAVENOUS at 10:07

## 2023-11-16 RX ADMIN — ONDANSETRON 4 MG: 2 INJECTION INTRAMUSCULAR; INTRAVENOUS at 04:55

## 2023-11-16 RX ADMIN — PROPOFOL 70 MG: 10 INJECTION, EMULSION INTRAVENOUS at 10:07

## 2023-11-16 RX ADMIN — DOCUSATE SODIUM 50MG AND SENNOSIDES 8.6MG 2 TABLET: 8.6; 5 TABLET, FILM COATED ORAL at 21:27

## 2023-11-16 RX ADMIN — Medication 10 ML: at 08:43

## 2023-11-16 RX ADMIN — VANCOMYCIN HYDROCHLORIDE 750 MG: 500 INJECTION, POWDER, LYOPHILIZED, FOR SOLUTION INTRAVENOUS at 12:07

## 2023-11-16 RX ADMIN — TRAZODONE HYDROCHLORIDE 25 MG: 50 TABLET ORAL at 21:27

## 2023-11-16 RX ADMIN — Medication 10 ML: at 21:27

## 2023-11-16 RX ADMIN — AMLODIPINE BESYLATE 10 MG: 5 TABLET ORAL at 12:08

## 2023-11-16 RX ADMIN — SODIUM CHLORIDE, POTASSIUM CHLORIDE, SODIUM LACTATE AND CALCIUM CHLORIDE: 600; 310; 30; 20 INJECTION, SOLUTION INTRAVENOUS at 10:06

## 2023-11-16 RX ADMIN — Medication 200 MCG: at 10:27

## 2023-11-16 RX ADMIN — METOPROLOL SUCCINATE 25 MG: 25 TABLET, EXTENDED RELEASE ORAL at 12:07

## 2023-11-16 RX ADMIN — Medication 200 MCG: at 10:24

## 2023-11-16 RX ADMIN — CEFEPIME 2000 MG: 2 INJECTION, POWDER, FOR SOLUTION INTRAVENOUS at 18:14

## 2023-11-16 RX ADMIN — ERYTHROPOIETIN 40000 UNITS: 40000 INJECTION, SOLUTION INTRAVENOUS; SUBCUTANEOUS at 12:08

## 2023-11-16 RX ADMIN — SODIUM CHLORIDE 250 MG: 9 INJECTION, SOLUTION INTRAVENOUS at 15:05

## 2023-11-16 RX ADMIN — DOCUSATE SODIUM 50MG AND SENNOSIDES 8.6MG 2 TABLET: 8.6; 5 TABLET, FILM COATED ORAL at 12:11

## 2023-11-16 RX ADMIN — Medication 10 ML: at 12:10

## 2023-11-16 RX ADMIN — LIDOCAINE HYDROCHLORIDE 60 MG: 20 INJECTION, SOLUTION EPIDURAL; INFILTRATION; INTRACAUDAL; PERINEURAL at 10:07

## 2023-11-16 RX ADMIN — CETIRIZINE HYDROCHLORIDE 10 MG: 10 TABLET, FILM COATED ORAL at 12:11

## 2023-11-16 RX ADMIN — LISINOPRIL 5 MG: 5 TABLET ORAL at 12:09

## 2023-11-16 RX ADMIN — Medication 200 MCG: at 10:14

## 2023-11-16 RX ADMIN — Medication 200 MCG: at 10:32

## 2023-11-16 NOTE — ANESTHESIA PREPROCEDURE EVALUATION
Anesthesia Evaluation     Patient summary reviewed and Nursing notes reviewed   NPO Solid Status: > 8 hours  NPO Liquid Status: > 4 hours           Airway   Mallampati: II  TM distance: >3 FB  Neck ROM: full  No difficulty expected  Dental    (+) poor dentition        Pulmonary - normal exam    breath sounds clear to auscultation  (+) COPD mild,  Cardiovascular   Exercise tolerance: poor (<4 METS)    Rhythm: regular  Rate: abnormal    (+) hypertension well controlled, CAD, CABG >6 Months, hyperlipidemia    PE comment: Sinus tach with occasional PVC's on monitor - -108 bpm    Neuro/Psych  (+) psychiatric history (dementia), dementia  GI/Hepatic/Renal/Endo    (+) GERD, GI bleeding active bleeding, renal disease- CRI, diabetes mellitus type 2 well controlled    Musculoskeletal     Abdominal    Substance History      OB/GYN          Other        ROS/Med Hx Other: Last dose Plavix: 11/15?       Labs:  11/15/23 14:10  WBC: 15.03 (H)  RBC: 2.23 (L)  Hemoglobin: 6.6 (C)  Hematocrit: 19.9 (C)  Platelets: 68 (L)  RDW: 16.3 (H)  MCV: 89.2  MCH: 29.6  MCHC: 33.2  MPV: 11.3  RDW-SD: 51.9    Received 2 units of PRBC's  Labs after blood products-     11/16/23 00:28  WBC: 26.88 (H)  RBC: 3.50 (L)  Hemoglobin: 10.4 (L)  Hematocrit: 30.6 (L)  Platelets: 60 (L)  RDW: 15.9 (H)  MCV: 87.4  MCH: 29.7  MCHC: 34.0  MPV: 10.5  RDW-SD: 48.5    11/16/23 04:14  WBC: 26.24 (H)  RBC: 3.53 (L)  Hemoglobin: 10.4 (L)  Hematocrit: 31.3 (L)  Platelets: 59 (L)  RDW: 16.5 (H)  MCV: 88.7  MCH: 29.5  MCHC: 33.2  MPV: 11.9  RDW-SD: 50.8    ECHO 09/21/23:   ·  Left ventricular ejection fraction appears to be 56 - 60%.  ·  Left ventricular wall thickness is consistent with mild concentric hypertrophy.  ·  The left atrial cavity is mildly dilated.  ·  Moderate tricuspid valve regurgitation is present.  ·  Estimated right ventricular systolic pressure from tricuspid regurgitation is mildly elevated (35-45 mmHg).    EKG 09/18/23:   HR 73,   Sinus  rhythm  Ventricular premature complex  Borderline T abnormalities, diffuse leads                    Anesthesia Plan    ASA 4     general   total IV anesthesia  (Total IV Anesthesia    Patient understands anesthesia not responsible for dental damage.  )  intravenous induction     Anesthetic plan, risks, benefits, and alternatives have been provided, discussed and informed consent has been obtained with: patient.  Pre-procedure education provided  Plan discussed with CRNA.    CODE STATUS:    Level Of Support Discussed With: Health Care Surrogate  Code Status (Patient has no pulse and is not breathing): CPR (Attempt to Resuscitate)  Medical Interventions (Patient has pulse or is breathing): Full Support

## 2023-11-16 NOTE — SIGNIFICANT NOTE
Patient ordered to receive 2 units PRBC. During 15 minute observation of infusion patient temp increased from 98.4 to 100. No other changes in vital signs noted. This RN contacted Dr. Reece for intervention. MD instructed to give PRN tylenol and restart blood if fever resolved. RN administered tylenol. Fever decreased to 98.6. blood restarted. Transfusion continued with no additional changes in vital signs.

## 2023-11-16 NOTE — THERAPY EVALUATION
Acute Care - Physical Therapy Initial Evaluation  [facility]     Patient Name: Selvin Ferguson  : 1935  MRN: [id number]  Today's Date: 2023      Visit Dx:     ICD-10-CM ICD-9-CM   1. Sepsis without acute organ dysfunction, due to unspecified organism  A41.9 038.9     995.91   2. Acute on chronic anemia  D64.9 285.9   3. Acute UTI (urinary tract infection)  N39.0 599.0   4. Acute upper GI bleed  K92.2 578.9   5. Constipation, unspecified constipation type  K59.00 564.00   6. Calculus of gallbladder without cholecystitis without obstruction  K80.20 574.20   7. Difficulty walking  R26.2 719.7     Patient Active Problem List   Diagnosis    Acute renal failure superimposed on chronic kidney disease    Uremia    Urinary retention    Bilateral hydronephrosis    Type 2 diabetes mellitus    Symptomatic anemia    Essential hypertension    CAD (coronary artery disease)    History of dementia    Chronic kidney disease, stage IV (severe)    Abnormal metabolic state due to diabetes mellitus    Advanced dementia    BPH (benign prostatic hyperplasia)    Chronic obstructive pulmonary disease, unspecified    Gastroesophageal reflux disease    Hypertensive heart disease with heart failure    Neuropathy    Anemia    Sepsis    Acute upper GI bleed     Past Medical History:   Diagnosis Date    Dementia     Diabetes mellitus     Hyperlipidemia     Hypertension     Renal disorder      Past Surgical History:   Procedure Laterality Date    CATARACT EXTRACTION      COLONOSCOPY      COLONOSCOPY N/A 10/12/2023    Procedure: COLONOSCOPY;  Surgeon: Renny Tong MD;  Location: [facility] ENDOSCOPY;  Service: Gastroenterology;  Laterality: N/A;  DIVERTICULOSIS    CORONARY ARTERY BYPASS GRAFT      CYSTOSCOPY      ENDOSCOPY N/A 2023    Procedure: ESOPHAGOGASTRODUODENOSCOPY WITH COLD BIOPSIES;  Surgeon: Renny Tong MD;  Location: [facility] ENDOSCOPY;  Service: Gastroenterology;  Laterality: N/A;  HIATAL HERNIA     PROSTATE SURGERY       PT Assessment (last 12 hours)       PT Evaluation and Treatment       Row Name 11/16/23 1400 11/16/23 0800       Physical Therapy Time and Intention    Subjective Information no complaints  -DP --    Document Type evaluation  -DP --    Mode of Treatment individual therapy;physical therapy  -DP --    Session Not Performed -- other (see comments)  -DP    Patient Effort good  -DP --      Row Name 11/16/23 1400          General Information    Patient Profile Reviewed yes  -DP     Patient Observations alert;cooperative;agree to therapy  -DP     Prior Level of Function independent:;transfer;gait;bed mobility;ADL's  Patient was at [facility] for rehab prior to this hospital admission.  -DP     Barriers to Rehab none identified  -DP       Row Name 11/16/23 1400          Living Environment    Current Living Arrangements home  -DP     Home Accessibility wheelchair accessible  -DP     People in Home spouse  -DP       Row Name 11/16/23 1400          Cognition    Orientation Status (Cognition) oriented to;person;place  -DP       Row Name 11/16/23 1400          Range of Motion (ROM)    Range of Motion bilateral lower extremities;ROM is WFL  -DP       Row Name 11/16/23 1400          Strength (Manual Muscle Testing)    Strength (Manual Muscle Testing) bilateral lower extremities  4-/5  -DP       Row Name 11/16/23 1400          Bed Mobility    Bed Mobility supine-sit-supine  -DP     Supine-Sit-Supine [place] (Bed Mobility) minimum assist (75% patient effort)  -DP       Row Name 11/16/23 1400          Transfers    Transfers sit-stand transfer  -DP       Row Name 11/16/23 1400          Sit-Stand Transfer    Sit-Stand [place] (Transfers) minimum assist (75% patient effort)  -DP       Row Name 11/16/23 1400          Gait/Stairs (Locomotion)    Gait/Stairs Locomotion gait/ambulation assistive device  -DP     [place] Level (Gait) contact guard  -DP     Assistive Device (Gait) walker,  front-wheeled  -DP       Row Name 11/16/23 1400          Balance    Balance Assessment standing dynamic balance  -DP     Dynamic Standing Balance contact guard  -DP     Position/Device Used, Standing Balance supported;walker, front-wheeled  -DP       Row Name 11/16/23 1400          Plan of Care Review    Plan of Care Reviewed With patient  -DP     Outcome Evaluation Patient presents with decreased strength, transfers, and functional mobility.  He will benefit from inpatient physical therapy services and return to [facility] for continued rehab.  -DP       Row Name 11/16/23 1400          Therapy Assessment/Plan (PT)    Rehab Potential (PT) good, to achieve stated therapy goals  -DP     Criteria for Skilled Interventions Met (PT) yes;meets criteria  -DP     Therapy Frequency (PT) daily  -DP     Predicted Duration of Therapy Intervention (PT) 10 days  -DP     Problem List (PT) problems related to;balance;strength;mobility  -DP     Activity Limitations Related to Problem List (PT) unable to transfer safely;unable to ambulate safely  -DP       Row Name 11/16/23 1400          PT Evaluation Complexity    History, PT Evaluation Complexity no personal factors and/or comorbidities  -DP     Examination of Body Systems (PT Eval Complexity) total of 4 or more elements  -DP     Clinical Presentation (PT Evaluation Complexity) stable  -DP     Clinical Decision Making (PT Evaluation Complexity) low complexity  -DP     Overall Complexity (PT Evaluation Complexity) low complexity  -DP       Row Name 11/16/23 1400          Physical Therapy Goals    Transfer Goal Selection (PT) transfer, PT goal 1  -DP     Gait Training Goal Selection (PT) gait training, PT goal 1  -DP       Row Name 11/16/23 1400          Transfer Goal 1 (PT)    Activity/Assistive Device (Transfer Goal 1, PT) sit-to-stand/stand-to-sit  -DP     [place] Level/Cues Needed (Transfer Goal 1, PT) supervision required  -DP     Time Frame (Transfer Goal 1, PT) 10  days  -DP       Row Name 11/16/23 1400          Gait Training Goal 1 (PT)    Activity/Assistive Device (Gait Training Goal 1, PT) assistive device use;walker, rolling  -DP     [place] Level (Gait Training Goal 1, PT) supervision required  -DP     Distance (Gait Training Goal 1, PT) 200  -DP     Time Frame (Gait Training Goal 1, PT) 10 days  -DP               User Key  (r) = Recorded By, (t) = Taken By, (c) = Cosigned By      Initials Name Provider Type    Whitney Bell PT Physical Therapist                      PT Recommendation and Plan  Anticipated Discharge Disposition (PT): sub acute care setting  Planned Therapy Interventions (PT): balance training, bed mobility training, gait training, strengthening, transfer training  Therapy Frequency (PT): daily  Plan of Care Reviewed With: patient  Outcome Evaluation: Patient presents with decreased strength, transfers, and functional mobility.  He will benefit from inpatient physical therapy services and return to [facility] for continued rehab.   Outcome Measures       Row Name 11/16/23 1400             How much help from another person do you currently need...    Turning from your back to your side while in flat bed without using bedrails? 4  -DP      Moving from lying on back to sitting on the side of a flat bed without bedrails? 3  -DP      Moving to and from a bed to a chair (including a wheelchair)? 3  -DP      Standing up from a chair using your arms (e.g., wheelchair, bedside chair)? 3  -DP      Climbing 3-5 steps with a railing? 3  -DP      To walk in hospital room? 3  -DP      AM-PAC 6 Clicks Score (PT) 19  -DP      Highest Level of Mobility Goal 6 --> Walk 10 steps or more  -DP         Functional Assessment    Outcome Measure Options AM-PAC 6 Clicks Basic Mobility (PT)  -DP                User Key  (r) = Recorded By, (t) = Taken By, (c) = Cosigned By      Initials Name Provider Type    Whitney Bell PT Physical Therapist                      Time Calculation:    PT Charges       Row Name 11/16/23 1454 11/16/23 0809          Time Calculation    PT Received On 11/16/23  -DP 11/16/23  -DP     PT Goal Re-Cert Due Date 11/25/23  -DP --        Untimed Charges    PT Eval/Re-eval Minutes 40  -DP --        Total Minutes    Untimed Charges Total Minutes 40  -DP --      Total Minutes 40  -DP --               User Key  (r) = Recorded By, (t) = Taken By, (c) = Cosigned By      Initials Name Provider Type    Whitney Bell, PT Physical Therapist                      PT G-Codes  Outcome Measure Options: AM-PAC 6 Clicks Basic Mobility (PT)  AM-PAC 6 Clicks Score (PT): 19    Whitney Thomas, PT  11/16/2023

## 2023-11-16 NOTE — CONSULTS
Date of service: 11/16/2023    Reason for consultation: Managing Plavix in the light of acute GI bleed.    HPI: An 88-year-old male, a nursing home resident, was admitted with hematemesis and anemia.  Apparently, he had recurrent GI bleed and his hemoglobin was approximately as low as 6.0 last month.  This required several blood transfusions.  Today, EGD demonstrated gastric AV malformation.  He is semi-awake at this time.  Communications are difficult but he answered simple questions..  However, he denied having chest or abdominal pains.  According to Dr. Reece, the patient has been on Plavix, but the reason why he takes this medication is unknown.  Therefore, I was asked to see whether there is a cardiac reason to continue giving him this medication.  The patient is lying on bed without any apparent symptoms such as dyspnea, orthopnea, any pains, nausea or vomiting.    Review of systems: Unobtainable this time    Past medical surgical history:    1.  Hypertension  2.  Hypercholesteremia  3.  DM  4.  Chronic renal disease stage III  5.  Dementia  6.  Surgeries-procedures: EGD, colonoscopy, prostate surgery and cataract extraction.    Medications: See the patient's medication list    Allergies: KDA    Social history: Quit cigarette smoking 10 years ago.  Used to smoke 1 PPD for years.  No alcohol or illicit drug use neck.    Family history: Unobtainable.  Nothing recorded in his medical records.    Physical examination: He was in no pain or distress.  Vital signs: Reviewed  HEENT: Sclerae: Nonicteric  Neck: No JVD or carotid bruit.  Chest: CTA  Cardiac: RRR.  Tachycardic.  No murmurs or S3 gallop  Abdomen: Soft and nontender.  No megalies or masses  Extremities: No edema, cyanosis or clubbing.  Pulse intact  Neuro: Nonverbal.    Records: Reviewed    Assessment and plan: An 88-year-old male with recurrent and significant GI bleed.  Today's EGD demonstrated AV malformation.  At this time, due to the ongoing acute  GI bleed, Plavix should be discontinued.  I reviewed his current medical records at [facility].  There was no history of CAD or vascular disease mentioned in his medical records.  [facility] what was the indication of putting him on Plavix.

## 2023-11-16 NOTE — PROGRESS NOTES
[facility]     Progress Note    Patient Name: Selvin Ferguson  : 1935  MRN: [id number]  Primary Care Physician:  Kayla Renee, CAYETANO  Date of admission: 11/15/2023    Subjective   Subjective     Chief Complaint: Patient with myelodysplastic syndrome dementia not a candidate for chemotherapy, [facility] is [facility], patient is requiring transfusions has had GI blood loss, has had tarry colored vomitus, cannot give a good history because of dementia,    HPI: With dementia and has issues with myelodysplastic syndrome    Review of Systems   All systems were reviewed and negative except for: Reviewed    Objective   Objective     Vitals:   Temp:  [98.1 °F (36.7 °C)-100.4 °F (38 °C)] 99 °F (37.2 °C)  Heart Rate:  [] 108  Resp:  [16-25] 20  BP: (110-148)/(40-81) 138/55    Physical Exam    Constitutional: Awake, alert   Eyes: PERRLA, sclerae anicteric, no conjunctival injection   HENT: NCAT, mucous membranes moist   Neck: Supple, no thyromegaly, no lymphadenopathy, trachea midline   Respiratory: Clear to auscultation bilaterally, nonlabored respirations    Cardiovascular: RRR, no murmurs, rubs, or gallops, palpable pedal pulses bilaterally   Gastrointestinal: Positive bowel sounds, soft, nontender, nondistended   Musculoskeletal: No bilateral ankle edema, no clubbing or cyanosis to extremities   Psychiatric: Appropriate affect, cooperative   Neurologic: Oriented x 3, strength symmetric in all extremities, Cranial Nerves grossly intact to confrontation, speech clear   Skin: No rashes     Result Review    Result Review:  I have personally reviewed the results from the time of this admission to 2023 08:12 EST and agree with these findings:  [x]  Laboratory  []  Microbiology  []  Radiology  []  EKG/Telemetry   []  Cardiology/Vascular   []  Pathology  []  Old records  []  Other:  Most notable findings include: Anemia    Assessment & Plan   Assessment / Plan     Brief Patient  Summary:  Selvin Ferguson is a 88 y.o. male who anemia and myelodysplastic syndrome with dementia possible GI blood loss    Active Hospital Problems:  Active Hospital Problems    Diagnosis     **Sepsis     Acute upper GI bleed        Plan:   Fever seems to be subsiding there is a concern about sepsis,    DVT prophylaxis:  Mechanical DVT prophylaxis orders are present.    CODE STATUS:   Level Of Support Discussed With: Health Care Surrogate  Code Status (Patient has no pulse and is not breathing): CPR (Attempt to Resuscitate)  Medical Interventions (Patient has pulse or is breathing): Full Support    Disposition:  I expect patient to be discharged patient has been stabilized.    Electronically signed by Tristin Reece MD, 11/16/23, 8:12 AM EST.      Part of this note may be an electronic transcription/translation of spoken language to printed text using the Dragon Dictation System.

## 2023-11-16 NOTE — PLAN OF CARE
Goal Outcome Evaluation:  Plan of Care Reviewed With: patient           Outcome Evaluation: Patient presents with decreased strength, transfers, and functional mobility.  He will benefit from inpatient physical therapy services and return to [facility] for continued rehab.      Anticipated Discharge Disposition (PT): sub acute care setting

## 2023-11-16 NOTE — ANESTHESIA POSTPROCEDURE EVALUATION
Patient: Selvin Ferguson    Procedure Summary       Date: 11/16/23 Room / Location: [facility] ENDOSCOPY 4 / [facility] ENDOSCOPY    Anesthesia Start: 1006 Anesthesia Stop: 1032    Procedure: ENTEROSCOPY SMALL BOWEL WITH APC RIGHT COLON Diagnosis:       Acute upper GI bleed      (Acute upper GI bleed [K92.2])    Surgeons: Renny Tong MD Provider: Leora Scott CRNA    Anesthesia Type: general ASA Status: Not recorded            Anesthesia Type: general    Vitals  Vitals Value Taken Time   /66 11/16/23 1100   Temp 36.2 °C (97.2 °F) 11/16/23 1100   Pulse 104 11/16/23 1102   Resp 18 11/16/23 1100   SpO2 95 % 11/16/23 1100   Vitals shown include unfiled device data.        Post Anesthesia Care and Evaluation    Post-procedure mental status: acceptable.  Pain management: satisfactory to patient    Airway patency: patent  Anesthetic complications: No anesthetic complications    Cardiovascular status: acceptable  Respiratory status: acceptable    Comments: Per chart review, patient returned to inpatient room

## 2023-11-16 NOTE — CONSULTS
History and Physical    Patient Name:  Selvin Ferguson  YOB: 1935  [id number]    Referring Provider: Dr. Marinelli      Patient Care Team:  Kayla Renee APRN as PCP - General (Family Medicine)  Tristin Reece MD as Consulting Physician (Hematology and Oncology)    Reason for consult/Chief complaint:  abdominal pain    Subjective .     History of present illness:   Selvin Ferguson is a 88 y.o. who presented to the ED at [facility] overnight for evaluation of coffee-ground emesis and abdominal pain that started at a nursing facility of which he is a resident.  He has dementia so history is limited and mainly from reviewing the chart.  He was found to have a UTI.  He has had one episode of vomiting since being admitted.  His hemoglobin on admission was 6.2 and he received blood.  Now his hemoglobin is up to 10.  He had a CT of the abdomen and pelvis that indicates he has gallstones.        History:  Past Medical History:   Diagnosis Date    Dementia     Diabetes mellitus     Hyperlipidemia     Hypertension     Renal disorder        Past Surgical History:   Procedure Laterality Date    CATARACT EXTRACTION      COLONOSCOPY      COLONOSCOPY N/A 10/12/2023    Procedure: COLONOSCOPY;  Surgeon: Renny Tong MD;  Location: [facility] ENDOSCOPY;  Service: Gastroenterology;  Laterality: N/A;  DIVERTICULOSIS    CORONARY ARTERY BYPASS GRAFT      CYSTOSCOPY      ENDOSCOPY N/A 9/20/2023    Procedure: ESOPHAGOGASTRODUODENOSCOPY WITH COLD BIOPSIES;  Surgeon: Renny Tong MD;  Location: [facility] ENDOSCOPY;  Service: Gastroenterology;  Laterality: N/A;  HIATAL HERNIA    PROSTATE SURGERY         Family History   Family history unknown: Yes       Social History     Tobacco Use    Smoking status: Former     Packs/day: 1     Types: Cigarettes     Quit date: 2013     Years since quitting: 10.8    Smokeless tobacco: Never   Vaping Use    Vaping Use: Never used   Substance Use Topics    Alcohol use: Not Currently     Drug use: Never       Review of Systems:  A complete ROS was performed and all are negative except for what is documented in the HPI.    MEDS:  Prior to Admission medications    Medication Sig Start Date End Date Taking? Authorizing Provider   amLODIPine (NORVASC) 10 MG tablet Take 1 tablet by mouth Daily.   Yes Yahir Freeman MD   aspirin 81 MG EC tablet Take 1 tablet by mouth Daily.   Yes Yahir Freeman MD   clopidogrel (PLAVIX) 75 MG tablet Take 1 tablet by mouth Daily. 9/26/23  Yes Zoila Jung MD   fexofenadine (ALLEGRA) 60 MG tablet Take 1 tablet by mouth Daily As Needed. 5/3/22  Yes Yahir Freeman MD   fluticasone (FLONASE) 50 MCG/ACT nasal spray 2 sprays by Each Nare route Daily As Needed.   Yes Yahir Freeman MD   hydrophilic ointment Apply 1 application  topically to the appropriate area as directed 2 (Two) Times a Day As Needed for Dry Skin.   Yes Yahri Freeman MD   lidocaine (LIDODERM) 5 % Place 1 patch on the skin as directed by provider Daily As Needed for Mild Pain.   Yes Yahir Freeman MD   lisinopril (PRINIVIL,ZESTRIL) 10 MG tablet Take 0.5 tablets by mouth Daily.   Yes Yahir Freeman MD   metoprolol succinate XL (TOPROL-XL) 25 MG 24 hr tablet Take 1 tablet by mouth Daily for 30 days. 9/24/23 11/15/23 Yes Zoila Jung MD   nitroglycerin (NITROSTAT) 0.4 MG SL tablet Take 1 tablet by mouth Every 5 (Five) Minutes As Needed.   Yes Yahir Freeman MD   rosuvastatin (CRESTOR) 40 MG tablet Take 1 tablet by mouth Daily.   Yes Yahir Freeman MD   traZODone (DESYREL) 50 MG tablet Take 0.5 tablets by mouth Every Night.   Yes Yahir Freeman MD   Darbepoetin New 40 MCG/0.4ML solution prefilled syringe Inject 40 mcg under the skin into the appropriate area as directed Every 14 (Fourteen) Days.    Yahir Freeman MD        amLODIPine, 10 mg, Oral, Daily  cefepime, 2,000 mg, Intravenous, Q24H  cetirizine, 10 mg, Oral,  "Daily  epoetin gui/gui-epbx, 40,000 Units, Subcutaneous, Weekly  ferric gluconate, 250 mg, Intravenous, Daily  lisinopril, 5 mg, Oral, Daily  metoprolol succinate XL, 25 mg, Oral, Daily  senna-docusate sodium, 2 tablet, Oral, BID  sodium chloride, 10 mL, Intravenous, Q12H  traZODone, 25 mg, Oral, Nightly  vancomycin, 750 mg, Intravenous, Once  Vancomycin Pharmacy Intermittent/Pulse Dosing, , Does not apply, Daily         Pharmacy to Dose Cefepime,   Pharmacy to dose vancomycin,          Allergies:  Patient has no known allergies.    Objective         Physical Exam:      Constitutional:  well nourished, no acute distress, appear stated age /55 (BP Location: Left arm, Patient Position: Lying)   Pulse 108   Temp 99 °F (37.2 °C) (Oral)   Resp 20   Ht 180.3 cm (71\")   Wt 83.5 kg (184 lb 1.4 oz)   SpO2 98%   BMI 25.67 kg/m²    Eyes:  anicteric sclerae, moist conjunctivae, no lid lag, PERRLA  ENMT:  oropharynx clear, moist mucous membranes, good dentition  Neck:   full ROM, trachea midline, no thyromegaly  Cardiovascular: RRR, S1 and S2 present, no MRG, heart rate 108, no pedal edema  Respiratory: lungs CTA, respirations even and unlabored  GI:  Abdomen soft, low abdominal tenderness, nondistended, no HSM     Skin:  warm and dry, normal turgor, no rashes  Psychiatric:  alert and oriented x3, intact judgment and insight, cooperative         Results Review: I have reviewed the patient's labs and imaging including CBC, CMP, CT of abd and pelvis.    LABS/IMAGING:    WBC   Date Value Ref Range Status   11/16/2023 26.24 (H) 3.40 - 10.80 10*3/mm3 Final     RBC   Date Value Ref Range Status   11/16/2023 3.53 (L) 4.14 - 5.80 10*6/mm3 Final     Hemoglobin   Date Value Ref Range Status   11/16/2023 10.4 (L) 13.0 - 17.7 g/dL Final     Hematocrit   Date Value Ref Range Status   11/16/2023 31.3 (L) 37.5 - 51.0 % Final     MCV   Date Value Ref Range Status   11/16/2023 88.7 79.0 - 97.0 fL Final     MCH   Date Value Ref " Range Status   11/16/2023 29.5 26.6 - 33.0 pg Final     MCHC   Date Value Ref Range Status   11/16/2023 33.2 31.5 - 35.7 g/dL Final     RDW   Date Value Ref Range Status   11/16/2023 16.5 (H) 12.3 - 15.4 % Final     RDW-SD   Date Value Ref Range Status   11/16/2023 50.8 37.0 - 54.0 fl Final     MPV   Date Value Ref Range Status   11/16/2023 11.9 6.0 - 12.0 fL Final     Platelets   Date Value Ref Range Status   11/16/2023 59 (L) 140 - 450 10*3/mm3 Final     Neutrophil %   Date Value Ref Range Status   11/16/2023 92.6 (H) 42.7 - 76.0 % Final     Lymphocyte %   Date Value Ref Range Status   11/16/2023 3.9 (L) 19.6 - 45.3 % Final     Monocyte %   Date Value Ref Range Status   11/16/2023 2.1 (L) 5.0 - 12.0 % Final     Eosinophil %   Date Value Ref Range Status   11/16/2023 0.0 (L) 0.3 - 6.2 % Final     Basophil %   Date Value Ref Range Status   11/16/2023 0.6 0.0 - 1.5 % Final     Immature Grans %   Date Value Ref Range Status   11/16/2023 0.8 (H) 0.0 - 0.5 % Final     Neutrophils, Absolute   Date Value Ref Range Status   11/16/2023 24.31 (H) 1.70 - 7.00 10*3/mm3 Final     Lymphocytes, Absolute   Date Value Ref Range Status   11/16/2023 1.02 0.70 - 3.10 10*3/mm3 Final     Monocytes, Absolute   Date Value Ref Range Status   11/16/2023 0.54 0.10 - 0.90 10*3/mm3 Final     Eosinophils, Absolute   Date Value Ref Range Status   11/16/2023 0.00 0.00 - 0.40 10*3/mm3 Final     Basophils, Absolute   Date Value Ref Range Status   11/16/2023 0.16 0.00 - 0.20 10*3/mm3 Final     Immature Grans, Absolute   Date Value Ref Range Status   11/16/2023 0.21 (H) 0.00 - 0.05 10*3/mm3 Final     nRBC   Date Value Ref Range Status   11/16/2023 0.0 0.0 - 0.2 /100 WBC Final        Basic Metabolic Panel    Sodium Sodium   Date Value Ref Range Status   11/16/2023 141 136 - 145 mmol/L Final   11/15/2023 140 136 - 145 mmol/L Final      Potassium Potassium   Date Value Ref Range Status   11/16/2023 3.4 (L) 3.5 - 5.2 mmol/L Final   11/15/2023 3.7 3.5 -  "5.2 mmol/L Final     Comment:     Slight hemolysis detected by analyzer. Result may be falsely elevated.      Chloride Chloride   Date Value Ref Range Status   11/16/2023 108 (H) 98 - 107 mmol/L Final   11/15/2023 105 98 - 107 mmol/L Final      Bicarbonate No results found for: \"PLASMABICARB\"   BUN BUN   Date Value Ref Range Status   11/16/2023 19 8 - 23 mg/dL Final   11/15/2023 19 8 - 23 mg/dL Final      Creatinine Creatinine   Date Value Ref Range Status   11/16/2023 2.50 (H) 0.76 - 1.27 mg/dL Final   11/15/2023 2.69 (H) 0.76 - 1.27 mg/dL Final      Calcium Calcium   Date Value Ref Range Status   11/16/2023 8.8 8.6 - 10.5 mg/dL Final   11/15/2023 9.1 8.6 - 10.5 mg/dL Final      Glucose      No components found for: \"GLUCOSE.*\"        Lab Results   Component Value Date    GLUCOSE 172 (H) 11/16/2023    BUN 19 11/16/2023    CREATININE 2.50 (H) 11/16/2023    BCR 7.6 11/16/2023    K 3.4 (L) 11/16/2023    CO2 19.1 (L) 11/16/2023    CALCIUM 8.8 11/16/2023    PROTENTOTREF 7.3 09/19/2023    ALBUMIN 4.1 11/16/2023    LABIL2 1.2 09/19/2023    AST 25 11/16/2023    ALT 12 11/16/2023          CT Abdomen Pelvis Without Contrast    Result Date: 11/15/2023  PROCEDURE: CT ABDOMEN PELVIS WO CONTRAST  COMPARISON: [facility]iatric, CT, ABDOMEN-PELVIS W, 12/24/2006, 16:50.  Psychiatric, CT, CT ABDOMEN PELVIS WO CONTRAST, 9/20/2023, 21:45.  Psychiatric, CT, CT CHEST WO CONTRAST DIAGNOSTIC, 9/20/2023, 21:45.  INDICATIONS: Eval mid abdominal pain, GI bleed, fever  PROTOCOL:   Standard imaging protocol performed    RADIATION:   DLP: 467.1mGy*cm   Automated exposure control was utilized to minimize radiation dose.  TECHNIQUE: Axial images of the abdomen and pelvis without intravenous or oral contrast.  FINDINGS:  ABDOMEN: There is a minimal left pleural effusion.  The visualized heart is enlarged.  Endobronchial secretion is present in lower lobes.  There is a moderate hiatal hernia.  Questionable " pericholecystic inflammatory change.  Cholelithiasis.  The unenhanced liver, spleen, pancreas and adrenal glands are normal.  There is atrophy of the left kidney.  Cysts are present in the right kidney.  There is a 1 cm right renal cyst with coarse rim calcification.  PELVIS: No evidence of bowel obstruction, perforation or abscess.  There is a large amount of stool in the colon and rectum.  Stool in the rectum measures 9.5 cm in greatest AP dimension.  There is a fat containing left inguinal hernia.  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  Small amount of pelvic free fluid.  Chronic distention of the urinary bladder likely secondary to chronic bladder outlet obstruction.  The abdominal aorta has a normal caliber.  Atherosclerotic disease is present.  Colonic diverticulosis is present.  IMPRESSION:   1. Cholelithiasis.  Questionable pericholecystic inflammatory change.  Suggest correlation with any history of acute cholecystitis.  2. Minimal left pleural effusion.  Endobronchial secretion in the lower lobes possibly secondary to mucous plugging or bronchitis.  Suggest correlation with any history of aspiration.  3. Small amount of pelvic free fluid.  4. Large amount of stool throughout the colon and rectum.  Stool in the rectum measures 9.5 cm in greatest AP dimension.   ROSA DA SILVA MD       Electronically Signed and Approved By: ROSA DA SILVA MD on 11/15/2023 at 16:06                   Assessment & Plan         Sepsis    Acute upper GI bleed    No indications for general surgery   Patient has EGD planned for today            Electronically signed by CAYETANO Zelaya, 11/16/23, 8:18 AM EST.

## 2023-11-16 NOTE — PROGRESS NOTES
"[facility] Clinical Pharmacy Services: Vancomycin Monitoring Note    Selvin Ferguson is a 88 y.o. male who is on day 2 of pharmacy to dose vancomycin for Sepsis.    Previous Vancomycin Dose:   1750 mg IV x1 11/15 @1618  Imaging Reviewed?: Yes  Updated Cultures and Sensitivities:   No results found for: \"BLOODCX\"      Vitals/Labs  Ht: 180.3 cm (71\"); Wt: 83.5 kg (184 lb 1.4 oz)   Temp (24hrs), Av.9 °F (37.2 °C), Min:98.1 °F (36.7 °C), Max:100.4 °F (38 °C)   Estimated Creatinine Clearance: 24.1 mL/min (A) (by C-G formula based on SCr of 2.5 mg/dL (H)).       Results from last 7 days   Lab Units 23  0414 23  0028 11/15/23  1410   VANCOMYCIN RM mcg/mL 12.60  --   --    CREATININE mg/dL  --  2.50* 2.69*   WBC 10*3/mm3 26.24* 26.88* 15.03*     Assessment/Plan    Current Vancomycin Dose: pulse dosing with 750 mg IV once on  at 1000  Next Vanc Random ordered for  at 0600  We will continue to monitor patient changes and renal function     Thank you for involving pharmacy in this patient's care. Please contact pharmacy with any questions or concerns.    Martita Marcus  Clinical Pharmacist    "

## 2023-11-16 NOTE — ANESTHESIA POSTPROCEDURE EVALUATION
Patient: Selvin Ferguson    Procedure Summary       Date: 11/16/23 Room / Location: [facility] ENDOSCOPY 4 / [facility] END[facility]    Anesthesia Start: 1006 Anesthesia Stop: 1032    Procedure: ENTEROSCOPY SMALL BOWEL WITH APC RIGHT COLON Diagnosis:       Acute upper GI bleed      (Acute upper GI bleed [K92.2])    Surgeons: Renny Tong MD Provider: Leora Scott CRNA    Anesthesia Type: general ASA Status: Not recorded            Anesthesia Type: general    Vitals  Vitals Value Taken Time   /66 11/16/23 1100   Temp 36.2 °C (97.2 °F) 11/16/23 1100   Pulse 104 11/16/23 1102   Resp 18 11/16/23 1100   SpO2 95 % 11/16/23 1100   Vitals shown include unfiled device data.        Post Anesthesia Care and Evaluation    Post-procedure mental status: acceptable.  Pain management: satisfactory to patient    Airway patency: patent  Anesthetic complications: No anesthetic complications    Cardiovascular status: acceptable  Respiratory status: acceptable    Comments: Per chart review

## 2023-11-17 LAB
ALBUMIN SERPL-MCNC: 3.4 G/DL (ref 3.5–5.2)
ALBUMIN/GLOB SERPL: 0.9 G/DL
ALP SERPL-CCNC: 96 U/L (ref 39–117)
ALT SERPL W P-5'-P-CCNC: 13 U/L (ref 1–41)
ANION GAP SERPL CALCULATED.3IONS-SCNC: 9.5 MMOL/L (ref 5–15)
AST SERPL-CCNC: 26 U/L (ref 1–40)
BASOPHILS # BLD AUTO: 0.09 10*3/MM3 (ref 0–0.2)
BASOPHILS NFR BLD AUTO: 0.6 % (ref 0–1.5)
BH BB BLOOD EXPIRATION DATE: NORMAL
BH BB BLOOD EXPIRATION DATE: NORMAL
BH BB BLOOD TYPE BARCODE: 5100
BH BB BLOOD TYPE BARCODE: 5100
BH BB DISPENSE STATUS: NORMAL
BH BB DISPENSE STATUS: NORMAL
BH BB PRODUCT CODE: NORMAL
BH BB PRODUCT CODE: NORMAL
BH BB UNIT NUMBER: NORMAL
BH BB UNIT NUMBER: NORMAL
BILIRUB SERPL-MCNC: 1.6 MG/DL (ref 0–1.2)
BUN SERPL-MCNC: 24 MG/DL (ref 8–23)
BUN/CREAT SERPL: 8.5 (ref 7–25)
CALCIUM SPEC-SCNC: 8.6 MG/DL (ref 8.6–10.5)
CHLORIDE SERPL-SCNC: 118 MMOL/L (ref 98–107)
CO2 SERPL-SCNC: 18.5 MMOL/L (ref 22–29)
CREAT SERPL-MCNC: 2.82 MG/DL (ref 0.76–1.27)
CROSSMATCH INTERPRETATION: NORMAL
CROSSMATCH INTERPRETATION: NORMAL
DEPRECATED RDW RBC AUTO: 54.3 FL (ref 37–54)
EGFRCR SERPLBLD CKD-EPI 2021: 20.9 ML/MIN/1.73
EOSINOPHIL # BLD AUTO: 0.03 10*3/MM3 (ref 0–0.4)
EOSINOPHIL NFR BLD AUTO: 0.2 % (ref 0.3–6.2)
ERYTHROCYTE [DISTWIDTH] IN BLOOD BY AUTOMATED COUNT: 17.8 % (ref 12.3–15.4)
GLOBULIN UR ELPH-MCNC: 3.6 GM/DL
GLUCOSE SERPL-MCNC: 145 MG/DL (ref 65–99)
HCT VFR BLD AUTO: 26.5 % (ref 37.5–51)
HGB BLD-MCNC: 9 G/DL (ref 13–17.7)
IMM GRANULOCYTES # BLD AUTO: 0.2 10*3/MM3 (ref 0–0.05)
IMM GRANULOCYTES NFR BLD AUTO: 1.3 % (ref 0–0.5)
LARGE PLATELETS: NORMAL
LYMPHOCYTES # BLD AUTO: 0.8 10*3/MM3 (ref 0.7–3.1)
LYMPHOCYTES NFR BLD AUTO: 5 % (ref 19.6–45.3)
MCH RBC QN AUTO: 29.5 PG (ref 26.6–33)
MCHC RBC AUTO-ENTMCNC: 34 G/DL (ref 31.5–35.7)
MCV RBC AUTO: 86.9 FL (ref 79–97)
MONOCYTES # BLD AUTO: 0.3 10*3/MM3 (ref 0.1–0.9)
MONOCYTES NFR BLD AUTO: 1.9 % (ref 5–12)
NEUTROPHILS NFR BLD AUTO: 14.58 10*3/MM3 (ref 1.7–7)
NEUTROPHILS NFR BLD AUTO: 91 % (ref 42.7–76)
NRBC BLD AUTO-RTO: 0 /100 WBC (ref 0–0.2)
PLATELET # BLD AUTO: 41 10*3/MM3 (ref 140–450)
PMV BLD AUTO: 11.3 FL (ref 6–12)
POTASSIUM SERPL-SCNC: 3.2 MMOL/L (ref 3.5–5.2)
PROT SERPL-MCNC: 7 G/DL (ref 6–8.5)
RBC # BLD AUTO: 3.05 10*6/MM3 (ref 4.14–5.8)
RBC MORPH BLD: NORMAL
SMALL PLATELETS BLD QL SMEAR: NORMAL
SODIUM SERPL-SCNC: 146 MMOL/L (ref 136–145)
UNIT  ABO: NORMAL
UNIT  ABO: NORMAL
UNIT  RH: NORMAL
UNIT  RH: NORMAL
VANCOMYCIN SERPL-MCNC: 15.66 MCG/ML (ref 5–40)
WBC MORPH BLD: NORMAL
WBC NRBC COR # BLD AUTO: 16 10*3/MM3 (ref 3.4–10.8)

## 2023-11-17 PROCEDURE — 25810000003 SODIUM CHLORIDE 0.9 % SOLUTION: Performed by: INTERNAL MEDICINE

## 2023-11-17 PROCEDURE — 25010000002 CEFEPIME PER 500 MG: Performed by: INTERNAL MEDICINE

## 2023-11-17 PROCEDURE — 80053 COMPREHEN METABOLIC PANEL: CPT | Performed by: INTERNAL MEDICINE

## 2023-11-17 PROCEDURE — 80202 ASSAY OF VANCOMYCIN: CPT | Performed by: INTERNAL MEDICINE

## 2023-11-17 PROCEDURE — 25010000002 VANCOMYCIN 1 G RECONSTITUTED SOLUTION: Performed by: INTERNAL MEDICINE

## 2023-11-17 PROCEDURE — 85007 BL SMEAR W/DIFF WBC COUNT: CPT | Performed by: INTERNAL MEDICINE

## 2023-11-17 PROCEDURE — 85025 COMPLETE CBC W/AUTO DIFF WBC: CPT | Performed by: INTERNAL MEDICINE

## 2023-11-17 PROCEDURE — 25010000002 NA FERRIC GLUC CPLX PER 12.5 MG: Performed by: INTERNAL MEDICINE

## 2023-11-17 PROCEDURE — 99222 1ST HOSP IP/OBS MODERATE 55: CPT | Performed by: UROLOGY

## 2023-11-17 RX ORDER — FOLIC ACID 1 MG/1
1 TABLET ORAL DAILY
Status: DISCONTINUED | OUTPATIENT
Start: 2023-11-17 | End: 2023-11-23 | Stop reason: HOSPADM

## 2023-11-17 RX ORDER — POTASSIUM CHLORIDE 750 MG/1
20 CAPSULE, EXTENDED RELEASE ORAL ONCE
Status: COMPLETED | OUTPATIENT
Start: 2023-11-17 | End: 2023-11-17

## 2023-11-17 RX ORDER — POTASSIUM CHLORIDE 750 MG/1
40 CAPSULE, EXTENDED RELEASE ORAL ONCE
Status: COMPLETED | OUTPATIENT
Start: 2023-11-17 | End: 2023-11-17

## 2023-11-17 RX ADMIN — LISINOPRIL 5 MG: 5 TABLET ORAL at 10:34

## 2023-11-17 RX ADMIN — SODIUM CHLORIDE 250 MG: 9 INJECTION, SOLUTION INTRAVENOUS at 10:34

## 2023-11-17 RX ADMIN — METOPROLOL SUCCINATE 25 MG: 25 TABLET, EXTENDED RELEASE ORAL at 08:08

## 2023-11-17 RX ADMIN — DOCUSATE SODIUM 50MG AND SENNOSIDES 8.6MG 2 TABLET: 8.6; 5 TABLET, FILM COATED ORAL at 08:08

## 2023-11-17 RX ADMIN — CETIRIZINE HYDROCHLORIDE 10 MG: 10 TABLET, FILM COATED ORAL at 08:09

## 2023-11-17 RX ADMIN — Medication 10 ML: at 23:57

## 2023-11-17 RX ADMIN — CEFEPIME 2000 MG: 2 INJECTION, POWDER, FOR SOLUTION INTRAVENOUS at 16:48

## 2023-11-17 RX ADMIN — ACETAMINOPHEN 650 MG: 325 TABLET ORAL at 08:08

## 2023-11-17 RX ADMIN — Medication 10 ML: at 10:34

## 2023-11-17 RX ADMIN — POTASSIUM CHLORIDE 20 MEQ: 750 CAPSULE, EXTENDED RELEASE ORAL at 16:48

## 2023-11-17 RX ADMIN — ACETAMINOPHEN 650 MG: 325 TABLET ORAL at 17:20

## 2023-11-17 RX ADMIN — POTASSIUM CHLORIDE 40 MEQ: 750 CAPSULE, EXTENDED RELEASE ORAL at 11:44

## 2023-11-17 RX ADMIN — VANCOMYCIN HYDROCHLORIDE 500 MG: 1 INJECTION, POWDER, LYOPHILIZED, FOR SOLUTION INTRAVENOUS at 12:14

## 2023-11-17 RX ADMIN — AMLODIPINE BESYLATE 10 MG: 5 TABLET ORAL at 08:08

## 2023-11-17 RX ADMIN — FOLIC ACID 1 MG: 1 TABLET ORAL at 08:08

## 2023-11-17 NOTE — PLAN OF CARE
Goal Outcome Evaluation:  Plan of Care Reviewed With: patient           Outcome Evaluation: Patient alert and oriented. Pt speaks very little but will respond to your open ended questions. Blood pressures run soft. Pt is on room air. Pain treated per MAR. Continue with plan of care.

## 2023-11-17 NOTE — CONSULTS
[facility]   UROLOGY Consult Note    Patient Name: Selvin Ferguson  : 1935  MRN: [id number]  Primary Care Physician:  Kayla Renee APRN  Referring Physician: No ref. provider found  Date of admission: 11/15/2023    Subjective   Subjective     Reason for Consult/ Chief Complaint: Urinary retention    HPI:  Selvin Ferguson is a 88 y.o. male history of vascular dementia, CKD stage IV, and numerous other medical comorbidities, lives in nursing home, admitted for management of anemia, GI bleed noted to be in retention by nursing staff.    Serial bladder scans indicate volumes of 500-900.  Patient with intermittent caths and eventually Agustin catheter was placed.      No documentation at that time whether patient was uncomfortable with high bladder volumes or if patient was incontinent.    Baseline voiding status unknown prior to admission.  Patient unable to provide history; no known issues at nursing home with patient voiding.    Catheter placed without issue.    No noted agitation with catheter.    CT abdomen pelvis without contrast performed upon admission reveals chronic distention of the bladder; atrophic left kidney; no right renal hydronephrosis, right renal cyst 1 with coarse rim calcification    _______________  - Previously seen by Dr. 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  Plan at that time had been to obtain urodynamic testing to assess for bladder function with consideration of UroLift if any detrusor function.  Patient never had urodynamics and no documentation of additional follow-up.    Review of Systems   All systems were reviewed and negative except for: Unable to obtain secondary to patient mental status    Personal History     Past Medical History:   Diagnosis Date    Dementia     Diabetes mellitus     Hyperlipidemia     Hypertension      Renal disorder        Past Surgical History:   Procedure Laterality Date    CATARACT EXTRACTION      COLONOSCOPY      COLONOSCOPY N/A 10/12/2023    Procedure: COLONOSCOPY;  Surgeon: Renny Tong MD;  Location: [facility] ENDOSCOPY;  Service: Gastroenterology;  Laterality: N/A;  DIVERTICULOSIS    CORONARY ARTERY BYPASS GRAFT      CYSTOSCOPY      ENDOSCOPY N/A 9/20/2023    Procedure: ESOPHAGOGASTRODUODENOSCOPY WITH COLD BIOPSIES;  Surgeon: Renny Tong MD;  Location: [facility] ENDOSCOPY;  Service: Gastroenterology;  Laterality: N/A;  HIATAL HERNIA    PROSTATE SURGERY         Family History: Family history is unknown by patient. Otherwise pertinent FHx was reviewed and not pertinent to current issue.    Social History:  reports that he quit smoking about 10 years ago. His smoking use included cigarettes. He smoked an average of 1 pack per day. He has never used smokeless tobacco. He reports that he does not currently use alcohol. He reports that he does not use drugs.    Home Medications:  Darbepoetin New, amLODIPine, aspirin, clopidogrel, fexofenadine, fluticasone, hydrophilic ointment, lidocaine, lisinopril, metoprolol succinate XL, nitroglycerin, rosuvastatin, and traZODone    Allergies:  No Known Allergies    Objective    Objective     Vitals:   Temp:  [97 °F (36.1 °C)-99.2 °F (37.3 °C)] 98.6 °F (37 °C)  Heart Rate:  [] 99  Resp:  [16-27] 16  BP: ()/(33-87) 111/43    Physical Exam:  No acute distress, well-nourished  Resting   Lungs unlabored  Agustin in place, concentrated urine    Result Review    Result Review:  I have personally reviewed the results from the time of this admission to 11/17/2023 06:23 EST and agree with these findings:  [x]  Laboratory  []  Microbiology  []  Radiology  []  EKG/Telemetry   []  Cardiology/Vascular   []  Pathology  []  Old records  []  Other:      Assessment & Plan   Assessment / Plan     Brief Patient Summary:  Selvin Ferguson is a 88 y.o. male with vascular  dementia among other comorbidities who was admitted for management of GI bleed noted to be in retention; catheter in place    Suspected neurogenic bladder prior work-up by Dr. Wu    Active Hospital Problems:  Active Hospital Problems    Diagnosis     **Sepsis     Acute upper GI bleed     Urinary retention        Plan:   Chart and imaging reviewed    Agustin catheter in place; patient without known bother    Recommend maintaining Agustin catheter back to nursing home upon discharge    May follow-up with urology ARNP for consideration of void trial, further work-up.  Baseline voiding status unknown.  Unknown given patient's mental status whether eligible for UDS.    Documented cystoscopy by Dr. Wu 6/2022 was without significant evidence of obstructive uropathy, suspected neurogenic bladder.    Reassuring that CT scan was without hydronephrosis despite what appeared to be a chronically distended bladder.    Per chart review, CKD stage IV, appears nephrology consult pending per care everywhere    Maintain Agustin catheter  Follow-up with urology nurse practitioner    We will sign off, please call with questions        Electronically signed by Deb Mistry MD, 11/17/23, 6:13 AM EST.

## 2023-11-17 NOTE — SIGNIFICANT NOTE
11/17/23 1030   Coping/Psychosocial   Observed Emotional State calm;cooperative   Verbalized Emotional State other (see comments)  (The Pt doesn''t speak, just move his head.)   Trust Relationship/Rapport empathic listening provided   Involvement in Care interacting with patient   Additional Documentation Spiritual Care (Group)   Spiritual Care   Use of Spiritual Resources non-Moravian use of spiritual care   Spiritual Care Source  initiative   Spiritual Care Follow-Up follow-up, none required as presently assessed   Response to Spiritual Care receptive of support;engaged in conversation   Spiritual Care Interventions supportive conversation provided   Spiritual Care Visit Type initial   Receptivity to Spiritual Care visit welcomed

## 2023-11-17 NOTE — PROGRESS NOTES
"[facility] Clinical Pharmacy Services: Vancomycin Monitoring Note    Selvin Ferguson is a 88 y.o. male who is on day 3 of pharmacy to dose vancomycin for Sepsis.    Previous Vancomycin Dose:   750 mg IV x1  @1207  Imaging Reviewed?: Yes  Updated Cultures and Sensitivities:   11/15 urine cx- MRSA  11/15 blood cx / NGTD      Vitals/Labs  Ht: 180.3 cm (71\"); Wt: 83.5 kg (184 lb 1.4 oz)   Temp (24hrs), Av.2 °F (36.8 °C), Min:97 °F (36.1 °C), Max:99.2 °F (37.3 °C)   Estimated Creatinine Clearance: 21.4 mL/min (A) (by C-G formula based on SCr of 2.82 mg/dL (H)).       Results from last 7 days   Lab Units 23  0410 23  0414 23  0028 11/15/23  1410   VANCOMYCIN RM mcg/mL 15.66 12.60  --   --    CREATININE mg/dL 2.82*  --  2.50* 2.69*   WBC 10*3/mm3 16.00* 26.24* 26.88* 15.03*     Assessment/Plan    Current Vancomycin Dose: Will give vancomycin 500mg iv x 1 today.  Next Vanc Random ordered for  at 0600  We will continue to monitor patient changes and renal function     Thank you for involving pharmacy in this patient's care. Please contact pharmacy with any questions or concerns.    Celena Wang  Clinical Pharmacist      "

## 2023-11-17 NOTE — PROGRESS NOTES
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 David, feel at this time with his dementia we should just leave the catheter in he will be sent her back to the nursing home with Agustin catheter but we may have to try to take it out if possible because of the infection, we are still waiting for cardiology recommendation to stop Plavix because patient is getting intermittent bleeding and we do not know why patient is on Plavix cardiology efforts appreciated    HPI: With recurrent GI bleeding has had AVMs again and we probably should not keep on anticoagulation    Review of Systems   All systems were reviewed and negative except for: Has been reviewed    Objective   Objective     Vitals:   Temp:  [97 °F (36.1 °C)-99.2 °F (37.3 °C)] 98.6 °F (37 °C)  Heart Rate:  [] 99  Resp:  [16-27] 16  BP: ()/(33-87) 111/43    Physical Exam    Constitutional: Awake, alert   Eyes: PERRLA, sclerae anicteric, no conjunctival injection   HENT: NCAT, mucous membranes moist   Neck: Supple, no thyromegaly, no lymphadenopathy, trachea midline   Respiratory: Clear to auscultation bilaterally, nonlabored respirations    Cardiovascular: RRR, no murmurs, rubs, or gallops, palpable pedal pulses bilaterally   Gastrointestinal: Positive bowel sounds, soft, nontender, nondistended   Musculoskeletal: No bilateral ankle edema, no clubbing or cyanosis to extremities   Psychiatric: Appropriate affect, cooperative   Neurologic: Oriented x 3, strength symmetric in all extremities, Cranial Nerves grossly intact to confrontation, speech clear   Skin: No rashes   No change  Result Review    Result Review:  I have personally reviewed the results from the time of  this admission to 11/17/2023 08:18 EST and agree with these findings:  [x]  Laboratory  []  Microbiology  []  Radiology  []  EKG/Telemetry   []  Cardiology/Vascular   []  Pathology  []  Old records  []  Other:  Most notable findings include: No change    Assessment & Plan   Assessment / Plan     Brief Patient Summary:  Selvin Ferguson is a 88 y.o. male who patient stable doing well not in acute distress    Active Hospital Problems:  Active Hospital Problems    Diagnosis     **Sepsis     Acute upper GI bleed     Urinary retention        Plan:   Continue antibiotics we will try to wean off Agustin catheter if possible have discussed at length with Dr. David    DVT prophylaxis:  Mechanical DVT prophylaxis orders are present.    CODE STATUS:   Level Of Support Discussed With: Health Care Surrogate  Code Status (Patient has no pulse and is not breathing): CPR (Attempt to Resuscitate)  Medical Interventions (Patient has pulse or is breathing): Full Support    Disposition:  I expect patient to be discharged after the patient has been stabilized.    Electronically signed by Tristin Reece MD, 11/17/23, 8:18 AM EST.      Part of this note may be an electronic transcription/translation of spoken language to printed text using the Dragon Dictation System.

## 2023-11-17 NOTE — PROGRESS NOTES
Date of service: 11/17/2023    Resting without any apparent distress or pains.  The latest vital signs are stable.  Requested from his RN [facility] to see why he has been on Plavix.  Serum potassium was low at 3.2.  He will be given KCl.  Will obtain BMP in the morning.

## 2023-11-17 NOTE — PLAN OF CARE
Goal Outcome Evaluation:     Problem: Adult Inpatient Plan of Care  Goal: Plan of Care Review  Outcome: Ongoing, Progressing                      Pt remains AO with some mild confusion over night.  Agustin still in place and Urology is consulted.  He needs encouragement to reposition.  Continue to monitor. Pt should go back to nursing home when ready.

## 2023-11-18 LAB
ALBUMIN SERPL-MCNC: 3.4 G/DL (ref 3.5–5.2)
ALBUMIN/GLOB SERPL: 0.8 G/DL
ALP SERPL-CCNC: 128 U/L (ref 39–117)
ALT SERPL W P-5'-P-CCNC: 35 U/L (ref 1–41)
ANION GAP SERPL CALCULATED.3IONS-SCNC: 12 MMOL/L (ref 5–15)
ANION GAP SERPL CALCULATED.3IONS-SCNC: 12.3 MMOL/L (ref 5–15)
AST SERPL-CCNC: 67 U/L (ref 1–40)
BACTERIA SPEC AEROBE CULT: ABNORMAL
BASOPHILS # BLD AUTO: 0.08 10*3/MM3 (ref 0–0.2)
BASOPHILS NFR BLD AUTO: 0.7 % (ref 0–1.5)
BILIRUB SERPL-MCNC: 1.2 MG/DL (ref 0–1.2)
BUN SERPL-MCNC: 31 MG/DL (ref 8–23)
BUN SERPL-MCNC: 31 MG/DL (ref 8–23)
BUN/CREAT SERPL: 10 (ref 7–25)
BUN/CREAT SERPL: 9.3 (ref 7–25)
BURR CELLS BLD QL SMEAR: NORMAL
CALCIUM SPEC-SCNC: 8.4 MG/DL (ref 8.6–10.5)
CALCIUM SPEC-SCNC: 8.6 MG/DL (ref 8.6–10.5)
CHLORIDE SERPL-SCNC: 117 MMOL/L (ref 98–107)
CHLORIDE SERPL-SCNC: 117 MMOL/L (ref 98–107)
CO2 SERPL-SCNC: 16 MMOL/L (ref 22–29)
CO2 SERPL-SCNC: 16.7 MMOL/L (ref 22–29)
CREAT SERPL-MCNC: 3.1 MG/DL (ref 0.76–1.27)
CREAT SERPL-MCNC: 3.33 MG/DL (ref 0.76–1.27)
D-LACTATE SERPL-SCNC: 1.5 MMOL/L (ref 0.5–2)
DEPRECATED RDW RBC AUTO: 56.5 FL (ref 37–54)
EGFRCR SERPLBLD CKD-EPI 2021: 17.1 ML/MIN/1.73
EGFRCR SERPLBLD CKD-EPI 2021: 18.6 ML/MIN/1.73
EOSINOPHIL # BLD AUTO: 0.12 10*3/MM3 (ref 0–0.4)
EOSINOPHIL NFR BLD AUTO: 1 % (ref 0.3–6.2)
ERYTHROCYTE [DISTWIDTH] IN BLOOD BY AUTOMATED COUNT: 18.2 % (ref 12.3–15.4)
GLOBULIN UR ELPH-MCNC: 4.1 GM/DL
GLUCOSE SERPL-MCNC: 109 MG/DL (ref 65–99)
GLUCOSE SERPL-MCNC: 110 MG/DL (ref 65–99)
HCT VFR BLD AUTO: 25.8 % (ref 37.5–51)
HGB BLD-MCNC: 8.4 G/DL (ref 13–17.7)
IMM GRANULOCYTES # BLD AUTO: 0.07 10*3/MM3 (ref 0–0.05)
IMM GRANULOCYTES NFR BLD AUTO: 0.6 % (ref 0–0.5)
LYMPHOCYTES # BLD AUTO: 0.82 10*3/MM3 (ref 0.7–3.1)
LYMPHOCYTES NFR BLD AUTO: 6.9 % (ref 19.6–45.3)
MCH RBC QN AUTO: 28.9 PG (ref 26.6–33)
MCHC RBC AUTO-ENTMCNC: 32.6 G/DL (ref 31.5–35.7)
MCV RBC AUTO: 88.7 FL (ref 79–97)
MICROCYTES BLD QL: NORMAL
MONOCYTES # BLD AUTO: 0.2 10*3/MM3 (ref 0.1–0.9)
MONOCYTES NFR BLD AUTO: 1.7 % (ref 5–12)
NEUTROPHILS NFR BLD AUTO: 10.68 10*3/MM3 (ref 1.7–7)
NEUTROPHILS NFR BLD AUTO: 89.1 % (ref 42.7–76)
NRBC BLD AUTO-RTO: 0 /100 WBC (ref 0–0.2)
PLATELET # BLD AUTO: 36 10*3/MM3 (ref 140–450)
PMV BLD AUTO: 11.1 FL (ref 6–12)
POIKILOCYTOSIS BLD QL SMEAR: NORMAL
POTASSIUM SERPL-SCNC: 3.8 MMOL/L (ref 3.5–5.2)
POTASSIUM SERPL-SCNC: 4.1 MMOL/L (ref 3.5–5.2)
PROCALCITONIN SERPL-MCNC: 5.31 NG/ML (ref 0–0.25)
PROT SERPL-MCNC: 7.5 G/DL (ref 6–8.5)
RBC # BLD AUTO: 2.91 10*6/MM3 (ref 4.14–5.8)
SMALL PLATELETS BLD QL SMEAR: NORMAL
SODIUM SERPL-SCNC: 145 MMOL/L (ref 136–145)
SODIUM SERPL-SCNC: 146 MMOL/L (ref 136–145)
VANCOMYCIN SERPL-MCNC: 13.5 MCG/ML (ref 5–40)
WBC MORPH BLD: NORMAL
WBC NRBC COR # BLD AUTO: 11.97 10*3/MM3 (ref 3.4–10.8)

## 2023-11-18 PROCEDURE — 25010000002 CEFEPIME PER 500 MG: Performed by: INTERNAL MEDICINE

## 2023-11-18 PROCEDURE — 25810000003 SODIUM CHLORIDE 0.9 % SOLUTION: Performed by: INTERNAL MEDICINE

## 2023-11-18 PROCEDURE — 85007 BL SMEAR W/DIFF WBC COUNT: CPT | Performed by: INTERNAL MEDICINE

## 2023-11-18 PROCEDURE — 83605 ASSAY OF LACTIC ACID: CPT | Performed by: INTERNAL MEDICINE

## 2023-11-18 PROCEDURE — 25010000002 ONDANSETRON PER 1 MG: Performed by: INTERNAL MEDICINE

## 2023-11-18 PROCEDURE — 85025 COMPLETE CBC W/AUTO DIFF WBC: CPT | Performed by: INTERNAL MEDICINE

## 2023-11-18 PROCEDURE — 80053 COMPREHEN METABOLIC PANEL: CPT | Performed by: INTERNAL MEDICINE

## 2023-11-18 PROCEDURE — 80202 ASSAY OF VANCOMYCIN: CPT | Performed by: INTERNAL MEDICINE

## 2023-11-18 PROCEDURE — 87040 BLOOD CULTURE FOR BACTERIA: CPT | Performed by: INTERNAL MEDICINE

## 2023-11-18 PROCEDURE — 84145 PROCALCITONIN (PCT): CPT | Performed by: INTERNAL MEDICINE

## 2023-11-18 PROCEDURE — 25010000002 NA FERRIC GLUC CPLX PER 12.5 MG: Performed by: INTERNAL MEDICINE

## 2023-11-18 PROCEDURE — 25010000002 VANCOMYCIN 5 G RECONSTITUTED SOLUTION: Performed by: INTERNAL MEDICINE

## 2023-11-18 RX ORDER — DEXTROSE AND SODIUM CHLORIDE 5; .45 G/100ML; G/100ML
100 INJECTION, SOLUTION INTRAVENOUS CONTINUOUS
Status: DISCONTINUED | OUTPATIENT
Start: 2023-11-18 | End: 2023-11-18

## 2023-11-18 RX ORDER — DEXTROSE AND SODIUM CHLORIDE 5; .45 G/100ML; G/100ML
100 INJECTION, SOLUTION INTRAVENOUS CONTINUOUS
Status: DISCONTINUED | OUTPATIENT
Start: 2023-11-18 | End: 2023-11-19

## 2023-11-18 RX ADMIN — DEXTROSE AND SODIUM CHLORIDE 100 ML/HR: 5; 450 INJECTION, SOLUTION INTRAVENOUS at 12:34

## 2023-11-18 RX ADMIN — VANCOMYCIN HYDROCHLORIDE 500 MG: 500 INJECTION, POWDER, LYOPHILIZED, FOR SOLUTION INTRAVENOUS at 16:49

## 2023-11-18 RX ADMIN — CEFEPIME 2000 MG: 2 INJECTION, POWDER, FOR SOLUTION INTRAVENOUS at 18:52

## 2023-11-18 RX ADMIN — Medication 10 ML: at 11:30

## 2023-11-18 RX ADMIN — Medication 10 ML: at 21:52

## 2023-11-18 RX ADMIN — CETIRIZINE HYDROCHLORIDE 10 MG: 10 TABLET, FILM COATED ORAL at 11:25

## 2023-11-18 RX ADMIN — DOCUSATE SODIUM 50MG AND SENNOSIDES 8.6MG 2 TABLET: 8.6; 5 TABLET, FILM COATED ORAL at 21:52

## 2023-11-18 RX ADMIN — FOLIC ACID 1 MG: 1 TABLET ORAL at 11:25

## 2023-11-18 RX ADMIN — ONDANSETRON 4 MG: 2 INJECTION INTRAMUSCULAR; INTRAVENOUS at 22:05

## 2023-11-18 RX ADMIN — SODIUM CHLORIDE 250 MG: 9 INJECTION, SOLUTION INTRAVENOUS at 11:24

## 2023-11-18 RX ADMIN — TRAZODONE HYDROCHLORIDE 25 MG: 50 TABLET ORAL at 21:52

## 2023-11-18 RX ADMIN — DOCUSATE SODIUM 50MG AND SENNOSIDES 8.6MG 2 TABLET: 8.6; 5 TABLET, FILM COATED ORAL at 11:25

## 2023-11-18 NOTE — PLAN OF CARE
Goal Outcome Evaluation:  Plan of Care Reviewed With: patient           Outcome Evaluation: Patient remains alert with intermittin confusion. BPs soft,  was notified. SATs in 90s on RA. Critical on platletes,  was notified, awaiting new orders.

## 2023-11-18 NOTE — PROGRESS NOTES
"[facility] Clinical Pharmacy Services: Vancomycin Monitoring Note    Selvin Ferguson is a 88 y.o. male who is on day  of pharmacy to dose vancomycin for Sepsis.    Previous Vancomycin Dose:   500 mg IV at 1214 on   Imaging Reviewed?: N/A  Updated Cultures and Sensitivities:   11-15-23: URINE CX, CLEAN CATCH: > 100,000 CFU MRSA  11-15-23: COVID-19, FLU A/B, RSV PCR: NOT DETECTED  11-15-23: BLOOD CX, LEFT ARM: NGD2  11-15-23: BLOOD CX, RIGHT ARM: NGD2    Vitals/Labs  Ht: 180.3 cm (71\"); Wt: 83.5 kg (184 lb 1.4 oz)     Temp (24hrs), Av.5 °F (36.9 °C), Min:97.5 °F (36.4 °C), Max:99.1 °F (37.3 °C)    Estimated Creatinine Clearance: 19.5 mL/min (A) (by C-G formula based on SCr of 3.1 mg/dL (H)).     Results from last 7 days   Lab Units 23  0355 23  0410 23  0414 23  0028   VANCOMYCIN RM mcg/mL 13.50 15.66 12.60  --    CREATININE mg/dL 3.10* 2.82*  --  2.50*   WBC 10*3/mm3 11.97* 16.00* 26.24* 26.88*     Assessment/Plan    Current Vancomycin Dose: pulse dosing with 500 mg IV once on  at 1214  Random vancomycin level at 0355 this a.m. is 13.5 mcg/ml. Will re-dose with 500 mg today  Next Vanc Random ordered for tomorrow at 0600  We will continue to monitor patient changes and renal function     Thank you for involving pharmacy in this patient's care. Please contact pharmacy with any questions or concerns.    Rosette Horner  Clinical Pharmacist    "

## 2023-11-18 NOTE — PROGRESS NOTES
Date of service: 11/18/2023    Subjective: Awake and trying to eat.  No chest pain, dizziness, palpitations or SOB    Review of systems: Fatigue.  No headache, vertigo, nausea, vomiting, abdominal pain, fever or chills.    Physical examination: He was in no pain or distress.  Vital signs: Reviewed  HEENT: Sclerae: Nonicteric  Neck: No JVD or carotid bruit.  Chest: CTA  Cardiac: RRR.  Tachycardic.  No murmurs or S3 gallop  Abdomen: Soft and nontender.  No megalies or masses  Extremities: No edema, cyanosis or clubbing.  Pulse intact  Neuro: Alert, oriented, no facial droop and speech was clear    Records: Reviewed    Assessment and plan    1.  Telemetry: Sinus rhythm with intermittent nonsustained SVT and nonsustained ventricular tachycardia.  2.  Hypertension  3.  DM  4.  Chronic renal disease stage III-IV  5.  Intermittent borderline blood pressure.  Norvasc was put on hold.  6.  Start him on Toprol XL 25 mg p.o. daily.  7.  Hypokalemia, corrected  8.  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  Iron deficiency anemia, is given IV iron transfusion  9.  Thrombocytopenia, per Dr. Reece management  10.  We are still [facility].  Discussed with the floor .

## 2023-11-18 NOTE — PROGRESS NOTES
[facility]     Progress Note    Patient Name: Selvin Ferguson  : 1935  MRN: [id number]  Primary Care Physician:  Kayla Renee, CAYETANO  Date of admission: 11/15/2023    Subjective   Subjective     Chief Complaint: Patient admitted with severe anemia also is becoming hypotensive had urinary retention and growing MRSA from the urine he has dementia he has myelodysplastic syndrome has been transfusion dependent patient also had GI bleeding which has been cauterized AVMs patient will be going to the nursing home back again    HPI: Hypotensive worsening BUN/creatinine history of GI bleed    Review of Systems   All systems were reviewed and negative except for: Has been reviewed    Objective   Objective     Vitals:   Temp:  [97.5 °F (36.4 °C)-99.1 °F (37.3 °C)] 98.6 °F (37 °C)  Heart Rate:  [92-97] 96  Resp:  [20] 20  BP: (102-124)/(39-52) 122/49    Physical Exam    Constitutional: Awake, alert   Eyes: PERRLA, sclerae anicteric, no conjunctival injection   HENT: NCAT, mucous membranes moist   Neck: Supple, no thyromegaly, no lymphadenopathy, trachea midline   Respiratory: Clear to auscultation bilaterally, nonlabored respirations    Cardiovascular: RRR, no murmurs, rubs, or gallops, palpable pedal pulses bilaterally   Gastrointestinal: Positive bowel sounds, soft, nontender, nondistended   Musculoskeletal: No bilateral ankle edema, no clubbing or cyanosis to extremities   Psychiatric: Appropriate affect, cooperative   Neurologic: Oriented x 3, strength symmetric in all extremities, Cranial Nerves grossly intact to confrontation, speech clear   Skin: No rashes   No change  Result Review    Result Review:  I have personally reviewed the results from the time of this admission to 2023 11:43 EST and agree with these findings:  [x]  Laboratory  []  Microbiology  []  Radiology  []  EKG/Telemetry   []  Cardiology/Vascular   []  Pathology  [x]  Old records  []  Other:  Most notable findings include: With  dementia elevated BUN/creatinine    Assessment & Plan   Assessment / Plan     Brief Patient Summary:  Selvin Ferguson is a 88 y.o. male who elevated BUN/creatinine with dementia    Active Hospital Problems:  Active Hospital Problems    Diagnosis     **Sepsis     Acute upper GI bleed     Urinary retention        Plan:   We will get a nephrology consultation continue antibiotic    DVT prophylaxis:  Mechanical DVT prophylaxis orders are present.    CODE STATUS:   Level Of Support Discussed With: Health Care Surrogate  Code Status (Patient has no pulse and is not breathing): CPR (Attempt to Resuscitate)  Medical Interventions (Patient has pulse or is breathing): Full Support    Disposition:  I expect patient to be discharged after the patient has been stabilized.    Electronically signed by Tristin Reece MD, 11/18/23, 11:43 AM EST.      Part of this note may be an electronic transcription/translation of spoken language to printed text using the Dragon Dictation System.

## 2023-11-18 NOTE — NURSING NOTE
Pt having BP's in the low 90/50s manually. Provider notified and told do hold Norvasc in the AM. Will continue to closely monitor BP.

## 2023-11-19 ENCOUNTER — APPOINTMENT (OUTPATIENT)
Dept: ULTRASOUND IMAGING | Facility: HOSPITAL | Age: 88
DRG: 871 | End: 2023-11-19
Payer: MEDICARE

## 2023-11-19 PROBLEM — K59.00 ACUTE CONSTIPATION: Status: ACTIVE | Noted: 2023-11-19

## 2023-11-19 PROBLEM — N18.1 ACUTE RENAL FAILURE SUPERIMPOSED ON STAGE 1 CHRONIC KIDNEY DISEASE: Status: ACTIVE | Noted: 2022-05-14

## 2023-11-19 PROBLEM — E87.20 METABOLIC ACIDOSIS: Status: ACTIVE | Noted: 2023-11-19

## 2023-11-19 PROBLEM — D46.9 MYELODYSPLASTIC SYNDROME: Status: ACTIVE | Noted: 2023-11-19

## 2023-11-19 PROBLEM — K80.20 GALLSTONES: Status: ACTIVE | Noted: 2023-11-19

## 2023-11-19 LAB
GLUCOSE BLDC GLUCOMTR-MCNC: 223 MG/DL (ref 70–99)
LIPASE SERPL-CCNC: 109 U/L (ref 13–60)
VANCOMYCIN SERPL-MCNC: 14.8 MCG/ML (ref 5–40)

## 2023-11-19 PROCEDURE — 84300 ASSAY OF URINE SODIUM: CPT | Performed by: INTERNAL MEDICINE

## 2023-11-19 PROCEDURE — 82948 REAGENT STRIP/BLOOD GLUCOSE: CPT

## 2023-11-19 PROCEDURE — 83690 ASSAY OF LIPASE: CPT | Performed by: INTERNAL MEDICINE

## 2023-11-19 PROCEDURE — 25010000002 ONDANSETRON PER 1 MG: Performed by: INTERNAL MEDICINE

## 2023-11-19 PROCEDURE — 93005 ELECTROCARDIOGRAM TRACING: CPT | Performed by: INTERNAL MEDICINE

## 2023-11-19 PROCEDURE — 76705 ECHO EXAM OF ABDOMEN: CPT

## 2023-11-19 PROCEDURE — 25010000002 VANCOMYCIN 5 G RECONSTITUTED SOLUTION: Performed by: INTERNAL MEDICINE

## 2023-11-19 PROCEDURE — 25010000002 CEFEPIME PER 500 MG: Performed by: INTERNAL MEDICINE

## 2023-11-19 PROCEDURE — 0 DEXTROSE 5 % SOLUTION: Performed by: INTERNAL MEDICINE

## 2023-11-19 PROCEDURE — 80202 ASSAY OF VANCOMYCIN: CPT | Performed by: INTERNAL MEDICINE

## 2023-11-19 PROCEDURE — 76775 US EXAM ABDO BACK WALL LIM: CPT

## 2023-11-19 PROCEDURE — 99232 SBSQ HOSP IP/OBS MODERATE 35: CPT | Performed by: SURGERY

## 2023-11-19 RX ORDER — METOPROLOL SUCCINATE 25 MG/1
25 TABLET, EXTENDED RELEASE ORAL EVERY 12 HOURS SCHEDULED
Status: DISCONTINUED | OUTPATIENT
Start: 2023-11-19 | End: 2023-11-23 | Stop reason: HOSPADM

## 2023-11-19 RX ORDER — DILTIAZEM HCL IN NACL,ISO-OSM 125 MG/125
5 PLASTIC BAG, INJECTION (ML) INTRAVENOUS
Status: DISCONTINUED | OUTPATIENT
Start: 2023-11-19 | End: 2023-11-22

## 2023-11-19 RX ORDER — BISACODYL 5 MG/1
20 TABLET, DELAYED RELEASE ORAL ONCE
Status: COMPLETED | OUTPATIENT
Start: 2023-11-19 | End: 2023-11-19

## 2023-11-19 RX ORDER — BISACODYL 10 MG
10 SUPPOSITORY, RECTAL RECTAL DAILY
Status: DISCONTINUED | OUTPATIENT
Start: 2023-11-20 | End: 2023-11-23 | Stop reason: HOSPADM

## 2023-11-19 RX ORDER — SODIUM PHOSPHATE,MONO-DIBASIC 19G-7G/118
1 ENEMA (ML) RECTAL ONCE
Status: COMPLETED | OUTPATIENT
Start: 2023-11-19 | End: 2023-11-19

## 2023-11-19 RX ORDER — BISACODYL 10 MG
20 SUPPOSITORY, RECTAL RECTAL ONCE
Status: COMPLETED | OUTPATIENT
Start: 2023-11-20 | End: 2023-11-20

## 2023-11-19 RX ORDER — TAMSULOSIN HYDROCHLORIDE 0.4 MG/1
0.4 CAPSULE ORAL DAILY
Status: DISCONTINUED | OUTPATIENT
Start: 2023-11-19 | End: 2023-11-23 | Stop reason: HOSPADM

## 2023-11-19 RX ORDER — MIDODRINE HYDROCHLORIDE 2.5 MG/1
2.5 TABLET ORAL
Status: DISCONTINUED | OUTPATIENT
Start: 2023-11-19 | End: 2023-11-21

## 2023-11-19 RX ADMIN — MAGNESIUM HYDROXIDE 30 ML: 2400 SUSPENSION ORAL at 21:13

## 2023-11-19 RX ADMIN — Medication 10 ML: at 21:13

## 2023-11-19 RX ADMIN — Medication 10 ML: at 12:38

## 2023-11-19 RX ADMIN — DILTIAZEM HYDROCHLORIDE 13 MG/HR: 5 INJECTION INTRAVENOUS at 23:47

## 2023-11-19 RX ADMIN — ONDANSETRON 4 MG: 2 INJECTION INTRAMUSCULAR; INTRAVENOUS at 12:35

## 2023-11-19 RX ADMIN — CETIRIZINE HYDROCHLORIDE 10 MG: 10 TABLET, FILM COATED ORAL at 12:36

## 2023-11-19 RX ADMIN — SODIUM BICARBONATE 125 ML/HR: 84 INJECTION, SOLUTION INTRAVENOUS at 12:30

## 2023-11-19 RX ADMIN — TAMSULOSIN HYDROCHLORIDE 0.4 MG: 0.4 CAPSULE ORAL at 12:36

## 2023-11-19 RX ADMIN — METOPROLOL SUCCINATE 25 MG: 25 TABLET, EXTENDED RELEASE ORAL at 21:12

## 2023-11-19 RX ADMIN — MIDODRINE HYDROCHLORIDE 2.5 MG: 10 TABLET ORAL at 18:03

## 2023-11-19 RX ADMIN — VANCOMYCIN HYDROCHLORIDE 500 MG: 500 INJECTION, POWDER, LYOPHILIZED, FOR SOLUTION INTRAVENOUS at 12:35

## 2023-11-19 RX ADMIN — DOCUSATE SODIUM 50MG AND SENNOSIDES 8.6MG 2 TABLET: 8.6; 5 TABLET, FILM COATED ORAL at 12:36

## 2023-11-19 RX ADMIN — DOCUSATE SODIUM 50MG AND SENNOSIDES 8.6MG 2 TABLET: 8.6; 5 TABLET, FILM COATED ORAL at 21:12

## 2023-11-19 RX ADMIN — TRAZODONE HYDROCHLORIDE 25 MG: 50 TABLET ORAL at 21:12

## 2023-11-19 RX ADMIN — DEXTROSE AND SODIUM CHLORIDE 100 ML/HR: 5; 450 INJECTION, SOLUTION INTRAVENOUS at 07:53

## 2023-11-19 RX ADMIN — FOLIC ACID 1 MG: 1 TABLET ORAL at 12:36

## 2023-11-19 RX ADMIN — MIDODRINE HYDROCHLORIDE 2.5 MG: 10 TABLET ORAL at 13:50

## 2023-11-19 RX ADMIN — DILTIAZEM HYDROCHLORIDE 5 MG/HR: 5 INJECTION INTRAVENOUS at 17:48

## 2023-11-19 RX ADMIN — CEFEPIME 2000 MG: 2 INJECTION, POWDER, FOR SOLUTION INTRAVENOUS at 18:13

## 2023-11-19 RX ADMIN — BISACODYL 20 MG: 5 TABLET, COATED ORAL at 21:12

## 2023-11-19 RX ADMIN — METOPROLOL SUCCINATE 25 MG: 25 TABLET, EXTENDED RELEASE ORAL at 13:49

## 2023-11-19 RX ADMIN — SODIUM PHOSPHATE 1 ENEMA: 7; 19 ENEMA RECTAL at 16:41

## 2023-11-19 NOTE — PROGRESS NOTES
[facility]     Progress Note    Patient Name: Selvin Ferguson  : 1935  MRN: [id number]  Primary Care Physician:  Kayla Renee APRN  Date of admission: 11/15/2023    Subjective   Subjective     Chief Complaint: Patient continues to have low-grade fever has been growing MRSA from the urine he is on vancomycin and cefepime he has been also complaining of nausea we will get an ultrasound of the gallbladder patient otherwise stable he has had EGD blood pressures are somewhat low we will start him on Flomax and see if that will help with his urinary retention    HPI: We will stop the Agustin catheter and start on Flomax    Review of Systems   All systems were reviewed and negative except for: Has been reviewed    Objective   Objective     Vitals:   Temp:  [99 °F (37.2 °C)-100.2 °F (37.9 °C)] 99.2 °F (37.3 °C)  Heart Rate:  [103-122] 122  Resp:  [18-20] 18  BP: ()/(40-76) 139/42    Physical Exam    Constitutional: Awake, alert   Eyes: PERRLA, sclerae anicteric, no conjunctival injection   HENT: NCAT, mucous membranes moist   Neck: Supple, no thyromegaly, no lymphadenopathy, trachea midline   Respiratory: Clear to auscultation bilaterally, nonlabored respirations    Cardiovascular: RRR, no murmurs, rubs, or gallops, palpable pedal pulses bilaterally   Gastrointestinal: Positive bowel sounds, soft, nontender, nondistended   Musculoskeletal: No bilateral ankle edema, no clubbing or cyanosis to extremities   Psychiatric: Appropriate affect, cooperative   Neurologic: Oriented x 3, strength symmetric in all extremities, Cranial Nerves grossly intact to confrontation, speech clear   Skin: No rashes   No change  Result Review    Result Review:  I have personally reviewed the results from the time of this admission to 2023 11:18 EST and agree with these findings:  [x]  Laboratory chronic kidney disease  []  Microbiology  []  Radiology  []  EKG/Telemetry   []  Cardiology/Vascular   []  Pathology  []  Old  records  []  Other:  Most notable findings include: Chronic kidney disease    Assessment & Plan   Assessment / Plan     Brief Patient Summary:  Selvin Ferguson is a 88 y.o. male who anemia with myelodysplastic syndrome as well as GI bleed    Active Hospital Problems:  Active Hospital Problems    Diagnosis     **Sepsis     Metabolic acidosis     Myelodysplastic syndrome     Acute upper GI bleed     Chronic anemia     Chronic kidney disease, stage IV (severe)     Urinary retention     Acute renal failure superimposed on stage 1 chronic kidney disease        Plan:   Continue antibiotics, she has history of hydronephrosis previously has been evaluated by Dr. 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    CODE STATUS:   Level Of Support Discussed With: Health Care Surrogate  Code Status (Patient has no pulse and is not breathing): CPR (Attempt to Resuscitate)  Medical Interventions (Patient has pulse or is breathing): Full Support    Disposition:  I expect patient to be discharged after patient has been stabilized.    Electronically signed by Tristin Reece MD, 11/19/23, 11:18 AM EST.      Part of this note may be an electronic transcription/translation of spoken language to printed text using the Dragon Dictation System.

## 2023-11-19 NOTE — PROGRESS NOTES
Date of service: 11/19/2023    Subjective: Awake.  Does not talk but answered by shaking his head.  Finally, he smiled.  No chest pain, palpitations, SOB or lightheadedness.      Review of systems: No headache, nausea, vomiting or abdominal pain.    Physical examination: He was in no pain or distress.  Vital signs: Reviewed  HEENT: Sclerae: Nonicteric  Neck: No JVD or carotid bruit.  Chest: CTA  Cardiac: RRR.  Tachycardic.  No murmurs or S3 gallop  Abdomen: Soft and nontender.  No megalies or masses  Extremities: No edema, cyanosis or clubbing.  Pulse intact  Neuro: Alert, oriented, no facial droop     Records: Reviewed    Assessment and plan     1.  Telemetry: Sinus rhythm with intermittent nonsustained SVT and isolated PVCs and ventricular couplets x1.  Continue telemetry.  2.  Hypertension  3.  DM  4.  Chronic renal disease stage III-IV  5.  Intermittent borderline blood pressure.  Norvasc was put on hold.  6.  Start him on Toprol XL 25 mg p.o. daily.  7.  Hypokalemia, corrected  8.  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  Iron deficiency anemia, status post iron transfusion  9.  Thrombocytopenia, per Dr. Reece management  10. We we are unsuccessful to get [facility] to know anything about his cardiac history.  Therefore, we will discontinue Plavix as he has thrombocytopenia.  11. Cholelithiasis.  Dr. Reece told me that he may go for cholecystectomy.  Therefore he needs a Lexiscan nuclear stress test prior to that as he has a vague history of CAD and being on Plavix without unknown reason.

## 2023-11-19 NOTE — CONSULTS
[facility]   Consult Note    Patient Name: Selvin Ferguson  : 1935  MRN: [id number]  Primary Care Physician:  Kayla Renee APRN  Referring Physician: No ref. provider found  Date of admission: 11/15/2023    Subjective   Subjective     Reason for Consult/ Chief Complaint: Acute renal failure on chronic kidney disease    HPI:  Selvin Ferguson is a 88 y.o. male with past medical history significant for progressive dementia, essential hypertension, diabetes mellitus, diabetic neuropathy and neurogenic bladder, chronic kidney disease stage IV is admitted to hospital on November 15 because of recurrent anemia and now was having nausea vomiting abdominal discomfort and vomiting coffee-ground vomitus.  Patient's baseline creatinine is 2-2.4 and now is 3.3 bicarb is 16 and because of that reason I was consulted.  When I see the patient he has been feeling sick in stomach and he also vomited and clinically looks on the dry side.  Patient is hypotensive when I see him his systolic blood pressure has been down into 70s.  He has chronic indwelling Agustin catheter  Review of Systems  Constitutional:        Weakness tiredness fatigue  Eyes:                       No blurry vision, eye discharge, eye irritation, eye pain  HEENT:                   No acute hair loss, earache and discharge, nasal congestion or discharge, sore throat, postnasal drip  Respiratory:           No shortness of breath coughing sputum production wheezing hemoptysis pleuritic chest pain  Cardiovascular:     No chest pain, orthopnea, PND, dizziness, palpitation, lower extremity edema  Gastrointestinal:   No nausea vomiting diarrhea abdominal pain constipation  Genitourinary:       No urinary incontinence, hesitancy, frequency, urgency, dysuria  Neurological:        No confusion, headache, focal weakness, numbness, dysphasia  Hematologic:         No bruising, bleeding, pallor, lymphadenopathy  Endocrine:            No coldness, hot flashes,  polyuria, abnormal hair growth  Musculoskeletal:  No body pains, aches, arthritic pains, muscle pain ,muscle wasting  Psychiatric:          No low or high mood, anxiety, hallucinations, delusions  Skin.                      No rash, ulcers, bruising, itching    Personal History     Past Medical History:   Diagnosis Date    Dementia     Diabetes mellitus     Hyperlipidemia     Hypertension     Renal disorder        Past Surgical History:   Procedure Laterality Date    CATARACT EXTRACTION      COLONOSCOPY      COLONOSCOPY N/A 10/12/2023    Procedure: COLONOSCOPY;  Surgeon: Renny Tong MD;  Location: [facility] ENDOSCOPY;  Service: Gastroenterology;  Laterality: N/A;  DIVERTICULOSIS    CORONARY ARTERY BYPASS GRAFT      CYSTOSCOPY      ENDOSCOPY N/A 9/20/2023    Procedure: ESOPHAGOGASTRODUODENOSCOPY WITH COLD BIOPSIES;  Surgeon: Renny Tong MD;  Location: [facility] ENDOSCOPY;  Service: Gastroenterology;  Laterality: N/A;  HIATAL HERNIA    ENTEROSCOPY SMALL BOWEL N/A 11/16/2023    Procedure: ENTEROSCOPY SMALL BOWEL WITH APC RIGHT COLON;  Surgeon: Renny Tong MD;  Location: [facility] ENDOSCOPY;  Service: Gastroenterology;  Laterality: N/A;  HIATAL HERNIA, AVM STOMACH    PROSTATE SURGERY         Family History: Family history is unknown by patient. Otherwise pertinent FHx was reviewed and not pertinent to current issue.    Social History:  reports that he quit smoking about 10 years ago. His smoking use included cigarettes. He smoked an average of 1 pack per day. He has never used smokeless tobacco. He reports that he does not currently use alcohol. He reports that he does not use drugs.    Home Medications:  Darbepoetin New, amLODIPine, aspirin, clopidogrel, fexofenadine, fluticasone, hydrophilic ointment, lidocaine, lisinopril, metoprolol succinate XL, nitroglycerin, rosuvastatin, and traZODone    Allergies:  No Known Allergies    Objective    Objective     Vitals:   Temp:  [99 °F (37.2 °C)-100.2 °F  (37.9 °C)] 99.2 °F (37.3 °C)  Heart Rate:  [103-112] 112  Resp:  [18-20] 18  BP: ()/(40-76) 101/40    Physical Exam:             Constitutional:         Awake, alert responsive, conversant, no obvious distress   Eyes:                       PERRLA, sclerae anicteric, no conjunctival injection   HEENT:                   Moist mucous membranes, no nasal or eye discharge, no throat congestion   Neck:                      Supple, no thyromegaly, no lymphadenopathy, trachea midline, no elevated JVD   Respiratory:           Clear to auscultation bilaterally, nonlabored respirations    Cardiovascular:     RRR, no murmurs, rubs, or gallops, palpable pedal pulses bilaterally, No bilateral ankle edema   Gastrointestinal:   Positive bowel sounds, soft, nontender, non-distended, no organomegaly   Musculoskeletal:  No clubbing or cyanosis to extremities, muscle wasting, joint swelling, muscle weakness   Psychiatric:              Appropriate affect, cooperative   Neurologic:            Awake alert, oriented x 3, strength symmetric in all extremities, Cranial Nerves grossly intact to confrontation, speech clear   Skin:                      No rashes, bruising, skin ulcers, petechiae or ecchymosis    Result Review    Result Review:  I have personally reviewed the results from the time of this admission to 11/19/2023 08:36 EST and agree with these findings:  []  Laboratory  []  Microbiology  []  Radiology  []  EKG/Telemetry   []  Cardiology/Vascular   []  Pathology  []  Old records  []  Other:      Assessment & Plan   Assessment / Plan     Active Hospital Problems:  Active Hospital Problems    Diagnosis     **Sepsis     Metabolic acidosis     Myelodysplastic syndrome     Acute upper GI bleed     Chronic anemia     Chronic kidney disease, stage IV (severe)     Urinary retention     Acute renal failure superimposed on stage 1 chronic kidney disease        Plan:   Patient is clinically on the dry side and will start patient on  sodium bicarbonate drip with 100 mEq bicarb  Once patient's volume status improves and blood pressure is better expect improvement in renal function until unless patient already have developed ATN    Electronically signed by Annalee Kent MD, 11/18/23, 9:35 PM EST.

## 2023-11-19 NOTE — PROGRESS NOTES
"[facility] Clinical Pharmacy Services: Vancomycin Monitoring Note    Selvin Ferguson is a 88 y.o. male who is on day  of pharmacy to dose vancomycin for Sepsis.    Previous Vancomycin Dose:   500 mg IV at 1649 on   Imaging Reviewed?: N/A  Updated Cultures and Sensitivities:   11-15-23: URINE CX, CLEAN CATCH: > 100,000 CFU MRSA  11-15-23: COVID-19, FLU A/B, RSV PCR: NOT DETECTED  11-15-23: BLOOD CX, LEFT ARM: NGD3  11-15-23: BLOOD CX, RIGHT ARM: NGD3    Vitals/Labs  Ht: 180.3 cm (71\"); Wt: 83.5 kg (184 lb 1.4 oz)     Temp (24hrs), Av.5 °F (37.5 °C), Min:99 °F (37.2 °C), Max:100.2 °F (37.9 °C)    Estimated Creatinine Clearance: 18.1 mL/min (A) (by C-G formula based on SCr of 3.33 mg/dL (H)).       Results from last 7 days   Lab Units 23  0509 23  1058 23  0355 23  0410 23  0414   VANCOMYCIN RM mcg/mL 14.80  --  13.50 15.66 12.60   CREATININE mg/dL  --  3.33* 3.10* 2.82*  --    WBC 10*3/mm3  --   --  11.97* 16.00* 26.24*     Assessment/Plan    Current Vancomycin Dose: pulse dosing with 500 mg IV once on  at 1649  Random vancomycin level at 0509 this a.m. is 14.8 mcg/ml. Will re-dose with 500 mg  Next Vanc Random ordered for tomorrow at 0600  We will continue to monitor patient changes and renal function     Thank you for involving pharmacy in this patient's care. Please contact pharmacy with any questions or concerns.    Rosette Horner  Clinical Pharmacist    "

## 2023-11-19 NOTE — PROGRESS NOTES
[facility]     Progress Note    Patient Name: Selvin Ferguson  : 1935  MRN: [id number]  Primary Care Physician:  Kayla Renee, CAYETANO  Date of admission: 11/15/2023    Subjective   Patient is awake alert responsive complaining of nauseous feeling and vague abdominal pain  Blood pressure little bit better but still hypotensive  Review of Systems  Constitutional:        Weakness tiredness fatigue  Eyes:                       No blurry vision, eye discharge, eye irritation, eye pain  HEENT:                   No acute hair loss, earache and discharge, nasal congestion or discharge, sore throat, postnasal drip  Respiratory:           No shortness of breath coughing sputum production wheezing hemoptysis pleuritic chest pain  Cardiovascular:     No chest pain, orthopnea, PND, dizziness, palpitation, lower extremity edema  Gastrointestinal:   No nausea vomiting diarrhea abdominal pain constipation  Genitourinary:       No urinary incontinence, hesitancy, frequency, urgency, dysuria  Hematologic:         No bruising, bleeding, pallor, lymphadenopathy  Endocrine:            No coldness, hot flashes, polyuria, abnormal hair growth  Musculoskeletal:    No body pains, aches, arthritic pains, muscle pain ,muscle wasting  Psychiatric:          No low or high mood, anxiety, hallucinations, delusions  Skin.                      No rash, ulcers, bruising, itching  Neurological:        No confusion, headache, focal weakness, numbness, dysphasia    Objective   Objective     Vitals:   Temp:  [99 °F (37.2 °C)-100.2 °F (37.9 °C)] 99.2 °F (37.3 °C)  Heart Rate:  [103-112] 112  Resp:  [18-20] 18  BP: ()/(40-76) 101/40  Physical Exam    Constitutional: Awake, alert responsive, conversant, no obvious distress              Psychiatric:  Appropriate affect, cooperative   Neurologic:  Awake alert ,oriented x 3, strength symmetric in all extremities, Cranial Nerves grossly intact to confrontation, speech  clear   Eyes:   PERRLA, sclerae anicteric, no conjunctival injection   HEENT:  Moist mucous membranes, no nasal or eye discharge, no throat congestion   Neck:   Supple, no thyromegaly, no lymphadenopathy, trachea midline, no elevated JVD   Respiratory:  Clear to auscultation bilaterally, nonlabored respirations    Cardiovascular: RRR, no murmurs, rubs, or gallops, palpable pedal pulses bilaterally, No bilateral ankle edema   Gastrointestinal: Positive bowel sounds, soft, nontender, nondistended, no organomegaly   Musculoskeletal:  No clubbing or cyanosis to extremities,muscle wasting, joint swelling, muscle weakness             Skin:                      No rashes, bruising, skin ulcers, petechiae or ecchymosis    Result Review    Result Review:  I have personally reviewed the results from the time of this admission to 11/19/2023 08:37 EST and agree with these findings:  []  Laboratory  []  Microbiology  []  Radiology  []  EKG/Telemetry   []  Cardiology/Vascular   []  Pathology  []  Old records  []  Other:    Assessment & Plan   Assessment / Plan       Active Hospital Problems:    Active Hospital Problems    Diagnosis  POA    **Sepsis [A41.9]  Yes    Metabolic acidosis [E87.20]  Unknown    Myelodysplastic syndrome [D46.9]  Unknown     Severe anemia      Acute upper GI bleed [K92.2]  Unknown    Chronic anemia [D64.9]  Yes    Chronic kidney disease, stage IV (severe) [N18.4]  Yes      Baseline creatinine 2.0-2.4  Metabolic acidosis secondary to acute on chronic renal failure  Acute renal failure secondary to hypotension and dehydration because of extremely poor appetite and patient has been vomiting      Urinary retention [R33.9]  Yes    Acute renal failure superimposed on stage 1 chronic kidney disease [N17.9, N18.1]  Yes     Patient's baseline creatinine is around 2 and he follows with Dr. 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  Patient also has a metabolic acidosis secondary to chronic kidney disease  stage IIIb/IV  Urine analysis unremarkable  Patient has a history of hydronephrosis         Plan:   Change IV fluid to bicarb based fluids  No labs today  We will do labs tomorrow  Avoid hypotension  Continue empiric antibiotics       Electronically signed by Annalee Kent MD, 11/19/23, 8:37 AM EST.

## 2023-11-20 ENCOUNTER — TELEPHONE (OUTPATIENT)
Dept: GASTROENTEROLOGY | Facility: CLINIC | Age: 88
End: 2023-11-20
Payer: MEDICARE

## 2023-11-20 LAB
ALBUMIN SERPL-MCNC: 3.3 G/DL (ref 3.5–5.2)
ALBUMIN/GLOB SERPL: 0.9 G/DL
ALP SERPL-CCNC: 146 U/L (ref 39–117)
ALT SERPL W P-5'-P-CCNC: 51 U/L (ref 1–41)
AMYLASE SERPL-CCNC: 76 U/L (ref 28–100)
ANION GAP SERPL CALCULATED.3IONS-SCNC: 14.8 MMOL/L (ref 5–15)
AST SERPL-CCNC: 91 U/L (ref 1–40)
BACTERIA SPEC AEROBE CULT: NORMAL
BACTERIA SPEC AEROBE CULT: NORMAL
BILIRUB SERPL-MCNC: 1 MG/DL (ref 0–1.2)
BUN SERPL-MCNC: 32 MG/DL (ref 8–23)
BUN/CREAT SERPL: 8.8 (ref 7–25)
CALCIUM SPEC-SCNC: 8.3 MG/DL (ref 8.6–10.5)
CHLORIDE SERPL-SCNC: 119 MMOL/L (ref 98–107)
CO2 SERPL-SCNC: 19.2 MMOL/L (ref 22–29)
CREAT SERPL-MCNC: 3.65 MG/DL (ref 0.76–1.27)
EGFRCR SERPLBLD CKD-EPI 2021: 15.3 ML/MIN/1.73
GLOBULIN UR ELPH-MCNC: 3.8 GM/DL
GLUCOSE SERPL-MCNC: 265 MG/DL (ref 65–99)
MAGNESIUM SERPL-MCNC: 2 MG/DL (ref 1.6–2.4)
POTASSIUM SERPL-SCNC: 2.7 MMOL/L (ref 3.5–5.2)
PROT SERPL-MCNC: 7.1 G/DL (ref 6–8.5)
SODIUM SERPL-SCNC: 153 MMOL/L (ref 136–145)
SODIUM UR-SCNC: 38 MMOL/L
VANCOMYCIN SERPL-MCNC: 17.2 MCG/ML (ref 5–40)

## 2023-11-20 PROCEDURE — 83735 ASSAY OF MAGNESIUM: CPT | Performed by: INTERNAL MEDICINE

## 2023-11-20 PROCEDURE — 25010000002 CEFEPIME PER 500 MG: Performed by: INTERNAL MEDICINE

## 2023-11-20 PROCEDURE — 25010000002 POTASSIUM CHLORIDE 10 MEQ/100ML SOLUTION: Performed by: INTERNAL MEDICINE

## 2023-11-20 PROCEDURE — 80053 COMPREHEN METABOLIC PANEL: CPT | Performed by: INTERNAL MEDICINE

## 2023-11-20 PROCEDURE — 25010000002 VANCOMYCIN 5 G RECONSTITUTED SOLUTION: Performed by: INTERNAL MEDICINE

## 2023-11-20 PROCEDURE — 80202 ASSAY OF VANCOMYCIN: CPT | Performed by: INTERNAL MEDICINE

## 2023-11-20 PROCEDURE — 0 DEXTROSE 5 % SOLUTION 1,000 ML FLEX CONT: Performed by: INTERNAL MEDICINE

## 2023-11-20 PROCEDURE — 82150 ASSAY OF AMYLASE: CPT | Performed by: INTERNAL MEDICINE

## 2023-11-20 PROCEDURE — 0 DEXTROSE 5 % SOLUTION: Performed by: INTERNAL MEDICINE

## 2023-11-20 PROCEDURE — 99231 SBSQ HOSP IP/OBS SF/LOW 25: CPT | Performed by: NURSE PRACTITIONER

## 2023-11-20 RX ORDER — POTASSIUM CHLORIDE 7.45 MG/ML
10 INJECTION INTRAVENOUS
Status: COMPLETED | OUTPATIENT
Start: 2023-11-20 | End: 2023-11-20

## 2023-11-20 RX ORDER — POTASSIUM CHLORIDE 750 MG/1
40 CAPSULE, EXTENDED RELEASE ORAL
Status: DISPENSED | OUTPATIENT
Start: 2023-11-20 | End: 2023-11-20

## 2023-11-20 RX ADMIN — POTASSIUM CHLORIDE 40 MEQ: 750 CAPSULE, EXTENDED RELEASE ORAL at 14:07

## 2023-11-20 RX ADMIN — MIDODRINE HYDROCHLORIDE 2.5 MG: 10 TABLET ORAL at 11:32

## 2023-11-20 RX ADMIN — POTASSIUM CHLORIDE 10 MEQ: 7.46 INJECTION, SOLUTION INTRAVENOUS at 05:17

## 2023-11-20 RX ADMIN — DOCUSATE SODIUM 50MG AND SENNOSIDES 8.6MG 2 TABLET: 8.6; 5 TABLET, FILM COATED ORAL at 21:44

## 2023-11-20 RX ADMIN — CEFEPIME 2000 MG: 2 INJECTION, POWDER, FOR SOLUTION INTRAVENOUS at 17:21

## 2023-11-20 RX ADMIN — FOLIC ACID 1 MG: 1 TABLET ORAL at 09:17

## 2023-11-20 RX ADMIN — MIDODRINE HYDROCHLORIDE 2.5 MG: 10 TABLET ORAL at 09:17

## 2023-11-20 RX ADMIN — DILTIAZEM HYDROCHLORIDE 5 MG/HR: 5 INJECTION INTRAVENOUS at 22:41

## 2023-11-20 RX ADMIN — CETIRIZINE HYDROCHLORIDE 10 MG: 10 TABLET, FILM COATED ORAL at 09:17

## 2023-11-20 RX ADMIN — POTASSIUM CHLORIDE 10 MEQ: 7.46 INJECTION, SOLUTION INTRAVENOUS at 09:16

## 2023-11-20 RX ADMIN — VANCOMYCIN HYDROCHLORIDE 500 MG: 5 INJECTION, POWDER, LYOPHILIZED, FOR SOLUTION INTRAVENOUS at 11:32

## 2023-11-20 RX ADMIN — TAMSULOSIN HYDROCHLORIDE 0.4 MG: 0.4 CAPSULE ORAL at 09:17

## 2023-11-20 RX ADMIN — DILTIAZEM HYDROCHLORIDE 30 MG: 30 TABLET, FILM COATED ORAL at 11:32

## 2023-11-20 RX ADMIN — SODIUM BICARBONATE 75 MEQ: 84 INJECTION, SOLUTION INTRAVENOUS at 13:58

## 2023-11-20 RX ADMIN — SODIUM BICARBONATE 125 ML/HR: 84 INJECTION, SOLUTION INTRAVENOUS at 10:17

## 2023-11-20 RX ADMIN — METOPROLOL SUCCINATE 25 MG: 25 TABLET, EXTENDED RELEASE ORAL at 21:44

## 2023-11-20 RX ADMIN — DILTIAZEM HYDROCHLORIDE 30 MG: 30 TABLET, FILM COATED ORAL at 21:44

## 2023-11-20 RX ADMIN — MIDODRINE HYDROCHLORIDE 2.5 MG: 10 TABLET ORAL at 17:21

## 2023-11-20 RX ADMIN — TRAZODONE HYDROCHLORIDE 25 MG: 50 TABLET ORAL at 21:44

## 2023-11-20 RX ADMIN — BISACODYL 20 MG: 10 SUPPOSITORY RECTAL at 09:17

## 2023-11-20 RX ADMIN — DOCUSATE SODIUM 50MG AND SENNOSIDES 8.6MG 2 TABLET: 8.6; 5 TABLET, FILM COATED ORAL at 09:17

## 2023-11-20 RX ADMIN — POTASSIUM CHLORIDE 10 MEQ: 7.46 INJECTION, SOLUTION INTRAVENOUS at 06:55

## 2023-11-20 RX ADMIN — POTASSIUM CHLORIDE 40 MEQ: 750 CAPSULE, EXTENDED RELEASE ORAL at 19:30

## 2023-11-20 NOTE — PROGRESS NOTES
Date of service: 11/20/2023    They are cleaning him up at this point.  He has been on IV Cardizem drip since yesterday.  We will wean him off IV Cardizem drip.  Start Cardizem 30 mg p.o. twice daily.  In addition to metoprolol 25 mg p.o. twice daily.  Discussed with his RN; Jose.

## 2023-11-20 NOTE — PROGRESS NOTES
[facility]     Progress Note    Patient Name: Selvin Ferguson  : 1935  MRN: [id number]  Primary Care Physician:  Kayla Renee, CAYETANO  Date of admission: 11/15/2023    Subjective   Subjective     Chief Complaint: Patient with multiple health issues including atrial fibrillation recurrent GI bleeding constipation urinary tract infection with MRSA his catheter has been removed and he is voiding all right patient also was given potassium supplements today he does have chronic kidney disease patient had fecal impaction nausea vomiting which is improved since he got enemas Dr. Jain has been working on that possibility of cholecystitis to be managed by Dr. Jain as well no other complaints at this time    HPI: Patient was admitted because of acute GI bleeding transfusions were given he had AVMs which have been cauterized General condition is very poor    Review of Systems   All systems were reviewed and negative except for: Has been reviewed    Objective   Objective     Vitals:   Temp:  [97.3 °F (36.3 °C)-99.9 °F (37.7 °C)] 97.3 °F (36.3 °C)  Heart Rate:  [109-142] 109  Resp:  [16-20] 16  BP: ()/(40-89) 118/84    Physical Exam    Constitutional: Awake, alert   Eyes: PERRLA, sclerae anicteric, no conjunctival injection   HENT: NCAT, mucous membranes moist   Neck: Supple, no thyromegaly, no lymphadenopathy, trachea midline   Respiratory: Clear to auscultation bilaterally, nonlabored respirations    Cardiovascular: RRR, no murmurs, rubs, or gallops, palpable pedal pulses bilaterally   Gastrointestinal: Positive bowel sounds, soft, nontender, nondistended   Musculoskeletal: No bilateral ankle edema, no clubbing or cyanosis to extremities   Psychiatric: Appropriate affect, cooperative   Neurologic: Oriented x 3, strength symmetric in all extremities, Cranial Nerves grossly intact to confrontation, speech clear   Skin: No rashes   No change  Result Review    Result Review:  I have personally reviewed  the results from the time of this admission to 11/20/2023 07:55 EST and agree with these findings:  [x]  Laboratory  []  Microbiology  []  Radiology  []  EKG/Telemetry   []  Cardiology/Vascular   []  Pathology  []  Old records  []  Other:  Most notable findings include: Has been reviewed patient with chronic kidney disease myelodysplastic syndrome dementia and atrial fibrillation multiple health issues    Assessment & Plan   Assessment / Plan     Brief Patient Summary:  Selvin Ferguson is a 88 y.o. male who admitted with what appeared to be sepsis GI bleeding which appears to be getting better    Active Hospital Problems:  Active Hospital Problems    Diagnosis     **Sepsis     Metabolic acidosis     Myelodysplastic syndrome     Acute constipation     Gallstones     Acute upper GI bleed     Chronic anemia     Chronic kidney disease, stage IV (severe)     Urinary retention     Acute renal failure superimposed on stage 1 chronic kidney disease        Plan:   Continue current management supportive care    DVT prophylaxis:  Mechanical DVT prophylaxis orders are present.    CODE STATUS:   Level Of Support Discussed With: Health Care Surrogate  Code Status (Patient has no pulse and is not breathing): CPR (Attempt to Resuscitate)  Medical Interventions (Patient has pulse or is breathing): Full Support    Disposition:  I expect patient to be discharged after the patient has been stabilized.    Electronically signed by Tristin Reece MD, 11/20/23, 7:55 AM EST.      Part of this note may be an electronic transcription/translation of spoken language to printed text using the Dragon Dictation System.

## 2023-11-20 NOTE — PLAN OF CARE
Goal Outcome Evaluation:  Plan of Care Reviewed With: patient        Progress: improving  Outcome Evaluation: Patient remains alert to self and situation with occasional confusion. Patient remains on Cardizem drip, titraiting as per order. HR now at 107 with drip at 5. Patient has been constipated for several days. Orders placed for soap suds enema. Enema performed. Patient had modered sized loose BM. Patients K+ was noted to be 2.7. Potassium protocal intiated. Will continue to monitor.

## 2023-11-20 NOTE — PROGRESS NOTES
"[facility] Clinical Pharmacy Services: Vancomycin Monitoring Note    Selvin Ferguson is a 88 y.o. male who is on day  of pharmacy to dose vancomycin for Sepsis.    Previous Vancomycin Dose:   500 mg IV at 1649 on   Imaging Reviewed?: N/A  Updated Cultures and Sensitivities:   11-15-23: URINE CX, CLEAN CATCH: > 100,000 CFU MRSA  11-15-23: COVID-19, FLU A/B, RSV PCR: NOT DETECTED  11-15-23: BLOOD CX, LEFT ARM: NGD3  11-15-23: BLOOD CX, RIGHT ARM: NGD3  23: Blood culture(s): No growth at 24 hours     Vitals/Labs  Ht: 180.3 cm (71\"); Wt: 83.5 kg (184 lb 1.4 oz)     Temp (24hrs), Av.1 °F (36.7 °C), Min:97.3 °F (36.3 °C), Max:99.9 °F (37.7 °C)    Estimated Creatinine Clearance: 16.5 mL/min (A) (by C-G formula based on SCr of 3.65 mg/dL (H)).       Results from last 7 days   Lab Units 23  0351 23  0509 23  1058 23  0355 23  0410 23  0414   VANCOMYCIN RM mcg/mL 17.20 14.80  --  13.50 15.66 12.60   CREATININE mg/dL 3.65*  --  3.33* 3.10* 2.82*  --    WBC 10*3/mm3  --   --   --  11.97* 16.00* 26.24*     Assessment/Plan    Random level of 17.20 mcg/mL. Will schedule 500 mg every 24 hours for 2 doses.     Current Vancomycin Dose: 500 mg every 24 hours for 2 doses   Exposure target: AUC24 (range)400-600 mg/L.hr   AUC24,ss: 489 mg/L.hr  PAUC*: 84 %  Ctrough,ss: 18.3 mg/L  Pconc*: 35 %  Tox.: 15 %  No level currently ordered. Will order if therapy extended.  We will continue to monitor patient changes and renal function     Thank you for involving pharmacy in this patient's care. Please contact pharmacy with any questions or concerns.    Rene Rees [facility]  Clinical Pharmacist      "

## 2023-11-20 NOTE — PROGRESS NOTES
Enter Query Response Below      Query Response: unable to determine             If applicable, please update the problem list.   Patient: Selvin Ferguson        : 1935  Account: [id number]           Admit Date:         How to Respond to this query:       a. Click New Note     b. Answer query within the yellow box.                c. Update the Problem List, if applicable.      If you have any questions about this query contact me at: [email or web address]     Dr. Reece,        88yr male noted with sepsis and CARMEL treated with IV antibiotics and IV fluids.     After study, please clarify the following:    CARMEL due to sepsis  CARMEL due to _______  Other- specify_______  Unable to determine    By submitting this query, we are merely seeking further clarification of documentation to accurately reflect all conditions that you are monitoring, evaluating, treating or that extend the hospitalization or utilize additional resources of care. Please utilize your independent clinical judgment when addressing the question(s) above.     This query and your response, once completed, will be entered into the legal medical record.    Sincerely,  Lela SHIN Rn  Clinical Documentation Integrity Program

## 2023-11-20 NOTE — PLAN OF CARE
Problem: Adult Inpatient Plan of Care  Goal: Plan of Care Review  Outcome: Ongoing, Progressing  Flowsheets (Taken 11/20/2023 1603)  Progress: improving  Plan of Care Reviewed With:   spouse   patient  Outcome Evaluation: Patient alert and oriented to self only throughout shift. VSS. Patient continues on cardizem drip 5/hr. Patient has tolerated PO medications throughout shift. Enema and suppositories administered, multiple small bowel movements noted. Incontinence and skin care provided prn. Antibiotics and fluids administered per MAR. Q2 turns completed per policy. Continuing with plan of care.   Goal Outcome Evaluation:  Plan of Care Reviewed With: spouse, patient        Progress: improving  Outcome Evaluation: Patient alert and oriented to self only throughout shift. VSS. Patient continues on cardizem drip 5/hr. Patient has tolerated PO medications throughout shift. Enema and suppositories administered, multiple small bowel movements noted. Incontinence and skin care provided prn. Antibiotics and fluids administered per MAR. Q2 turns completed per policy. Continuing with plan of care.

## 2023-11-20 NOTE — CODE DOCUMENTATION
RRT called during shift change for concerns about the pts heart rate and BP.  EKG preformed.  See vitals flowsheet.  HR has been elevated today.  Pt has Cardizem gtt infusing, because BP is wnl after two rechecks, I requested the primary nurse increase Cardizem to 15mg/hr per order.  Pt's blood pressure tolerated that well.  Cardiology added Toprol XL today for heart rate.  Discussed giving that this evening with primary nurse.  Pt denies pain or any other complaints when this nurse left the bedside.  BP wnl.  See MAR.

## 2023-11-20 NOTE — TELEPHONE ENCOUNTER
EGD with push enteroscopy 11/16/2023: Medium size hiatal hernia, angioectasias with bleeding were found in the stomach  R/w Dr Tong pt may f/u as needed

## 2023-11-20 NOTE — PROGRESS NOTES
[facility]     Progress Note    Patient Name: Selvin Ferguson  : 1935  MRN: [id number]  Primary Care Physician:  Kayla Renee, CAYETANO  Date of admission: 11/15/2023    Subjective  patient is resting very comfortably and has no new issues   Hypokalemia versus because of metabolic acidosis correction   Patient's creatinine little worse because of ATN   Sodium level 152  Review of Systems  Constitutional:        Weakness tiredness fatigue  Eyes:                       No blurry vision, eye discharge, eye irritation, eye pain  HEENT:                   No acute hair loss, earache and discharge, nasal congestion or discharge, sore throat, postnasal drip  Respiratory:           No shortness of breath coughing sputum production wheezing hemoptysis pleuritic chest pain  Cardiovascular:     No chest pain, orthopnea, PND, dizziness, palpitation, lower extremity edema  Gastrointestinal:   No nausea vomiting diarrhea abdominal pain constipation  Genitourinary:       No urinary incontinence, hesitancy, frequency, urgency, dysuria  Hematologic:         No bruising, bleeding, pallor, lymphadenopathy  Endocrine:            No coldness, hot flashes, polyuria, abnormal hair growth  Musculoskeletal:    No body pains, aches, arthritic pains, muscle pain ,muscle wasting  Psychiatric:          No low or high mood, anxiety, hallucinations, delusions  Skin.                      No rash, ulcers, bruising, itching  Neurological:        No confusion, headache, focal weakness, numbness, dysphasia    Objective   Objective     Vitals:   Temp:  [97.3 °F (36.3 °C)-99.9 °F (37.7 °C)] 99.5 °F (37.5 °C)  Heart Rate:  [106-142] 106  Resp:  [16-20] 20  BP: ()/(40-89) 119/48  Physical Exam    Constitutional: Awake, alert responsive, conversant, no obvious distress              Psychiatric:  Appropriate affect, cooperative   Neurologic:  Awake alert ,oriented x 3, strength symmetric in all extremities, Cranial Nerves grossly intact to  confrontation, speech clear   Eyes:   PERRLA, sclerae anicteric, no conjunctival injection   HEENT:  Moist mucous membranes, no nasal or eye discharge, no throat congestion   Neck:   Supple, no thyromegaly, no lymphadenopathy, trachea midline, no elevated JVD   Respiratory:  Clear to auscultation bilaterally, nonlabored respirations    Cardiovascular: RRR, no murmurs, rubs, or gallops, palpable pedal pulses bilaterally, No bilateral ankle edema   Gastrointestinal: Positive bowel sounds, soft, nontender, nondistended, no organomegaly   Musculoskeletal:  No clubbing or cyanosis to extremities,muscle wasting, joint swelling, muscle weakness             Skin:                      No rashes, bruising, skin ulcers, petechiae or ecchymosis    Result Review    Result Review:  I have personally reviewed the results from the time of this admission to 11/20/2023 12:01 EST and agree with these findings:  []  Laboratory  []  Microbiology  []  Radiology  []  EKG/Telemetry   []  Cardiology/Vascular   []  Pathology  []  Old records  []  Other:    Assessment & Plan   Assessment / Plan       Active Hospital Problems:    Active Hospital Problems    Diagnosis  POA    **Sepsis [A41.9]  Yes    Metabolic acidosis [E87.20]  Unknown    Myelodysplastic syndrome [D46.9]  Unknown     Severe anemia      Acute constipation [K59.00]  Unknown    Gallstones [K80.20]  Unknown    Acute upper GI bleed [K92.2]  Unknown    Chronic anemia [D64.9]  Yes    Chronic kidney disease, stage IV (severe) [N18.4]  Yes      Baseline creatinine 2.0-2.4  Metabolic acidosis secondary to acute on chronic renal failure  Acute renal failure secondary to hypotension and dehydration because of extremely poor appetite and patient has been vomiting      Urinary retention [R33.9]  Yes    Acute renal failure superimposed on stage 1 chronic kidney disease [N17.9, N18.1]  Yes     Patient's baseline creatinine is around 2 and he follows with Dr. Wu regarding neurogenic bladder  from diabetes.  Patient also has a metabolic acidosis secondary to chronic kidney disease stage IIIb/IV  Urine analysis unremarkable  Patient has a history of hydronephrosis         Plan:    Aggressively replace potassium   Continue bicarb based fluids   Blood pressure is relatively more stable today    Kcl 40 mEq every 3 over 3 doses   Change bicarb fluid with bicarb down to 75 mEq  Electronically signed by Annalee Kent MD, 11/20/23, 12:01 PM EST.

## 2023-11-20 NOTE — PROGRESS NOTES
"PROGRESS NOTE     Patient Name:  Selvin Ferguson  YOB: 1935  [id number]   LOS: 5 days   4 Days Post-Op  Patient Care Team:  Kayla Renee APRN as PCP - General (Family Medicine)  Tristin Reece MD as Consulting Physician (Hematology and Oncology)      Reason for Consult/Chief complaint: follow up on possible cholecystitis    Subjective     Interval History: VSS, no reports of vomiting from nursing staff, no BMs      Review of Systems:      A complete review of systems was performed and all are negative except what is documented in the HPI.       Objective         Physical Exam:     Constitutional:  well nourished, no acute distress, appears stated age /84 (BP Location: Left arm, Patient Position: Lying)   Pulse 109   Temp 99.1 °F (37.3 °C) (Oral)   Resp 16   Ht 180.3 cm (71\")   Wt 83.5 kg (184 lb 1.4 oz)   SpO2 94%   BMI 25.67 kg/m²    Eyes:  anicteric sclerae, moist conjunctivae, no lid lag, PERRLA  ENMT:  oropharynx clear, moist mucous membranes, good dentition  Neck:   full ROM, trachea midline, no thyromegaly  Cardiovascular: RRR, S1 and S2 present, no MRG, heart rate 109, no pedal edema  Respiratory: lungs CTA, respirations even and unlabored  GI:  Abdomen soft, nontender, nondistended, no HSM     Skin:  warm and dry, normal turgor, no rashes  Psychiatric:  alert and oriented x1, poor judgment and insight, cooperative    Results Review:      I reviewed the patient's new clinical results including no new labs.  LABS:                        Medications:    Current Facility-Administered Medications:     acetaminophen (TYLENOL) tablet 650 mg, 650 mg, Oral, Q4H PRN, Renny Tong MD, 650 mg at 11/17/23 1720    aluminum-magnesium hydroxide-simethicone (MAALOX MAX) 400-400-40 MG/5ML suspension 15 mL, 15 mL, Oral, Q6H PRN, Renny Tong MD    [Held by provider] amLODIPine (NORVASC) tablet 10 mg, 10 mg, Oral, Daily, Renny Tong MD, 10 mg at 11/17/23 0808    " sennosides-docusate (PERICOLACE) 8.6-50 MG per tablet 2 tablet, 2 tablet, Oral, BID, 2 tablet at 11/19/23 2112 **AND** polyethylene glycol (MIRALAX) packet 17 g, 17 g, Oral, Daily PRN **AND** bisacodyl (DULCOLAX) EC tablet 5 mg, 5 mg, Oral, Daily PRN **AND** bisacodyl (DULCOLAX) suppository 10 mg, 10 mg, Rectal, Daily PRN, Renny Tong MD    bisacodyl (DULCOLAX) suppository 10 mg, 10 mg, Rectal, Daily, Juan F Jain MD    bisacodyl (DULCOLAX) suppository 20 mg, 20 mg, Rectal, Once, Juan F Jain MD    cefepime (MAXIPIME) 2000 mg/100 mL 0.9% NS (mbp), 2,000 mg, Intravenous, Q24H, Renny Tong MD, Last Rate: 200 mL/hr at 11/19/23 1813, 2,000 mg at 11/19/23 1813    cetirizine (zyrTEC) tablet 10 mg, 10 mg, Oral, Daily, Renny Tong MD, 10 mg at 11/19/23 1236    dilTIAZem (CARDIZEM) 125 mg in 125 mL sodium chloride  infusion, 5 mg/hr, Intravenous, Titrated, Constanza Hogan MD, Last Rate: 5 mL/hr at 11/20/23 0404, 5 mg/hr at 11/20/23 0404    epoetin gui (EPOGEN,PROCRIT) injection 40,000 Units, 40,000 Units, Subcutaneous, Weekly, Renny Tong MD, 40,000 Units at 11/16/23 1208    fluticasone (FLONASE) 50 MCG/ACT nasal spray 2 spray, 2 spray, Each Nare, Daily PRN, Renny Tong MD    folic acid (FOLVITE) tablet 1 mg, 1 mg, Oral, Daily, Tristin Reece MD, 1 mg at 11/19/23 1236    [Held by provider] lisinopril (PRINIVIL,ZESTRIL) tablet 5 mg, 5 mg, Oral, Daily, Renny Tong MD, 5 mg at 11/17/23 1034    metoprolol succinate XL (TOPROL-XL) 24 hr tablet 25 mg, 25 mg, Oral, Q12H, Constanza Hogan MD, 25 mg at 11/19/23 2112    midodrine (PROAMATINE) tablet 2.5 mg, 2.5 mg, Oral, TID AC, Constanza Hogan MD, 2.5 mg at 11/19/23 1803    nitroglycerin (NITROSTAT) SL tablet 0.4 mg, 0.4 mg, Sublingual, Q5 Min PRN, Renny Tong MD    ondansetron (ZOFRAN) injection 4 mg, 4 mg, Intravenous, Q6H PRN, Renny Tong MD, 4 mg at 11/19/23 1235    Pharmacy to Dose  Cefepime, , Does not apply, Continuous Ran DIA Syed Kashif, MD    Pharmacy to dose vancomycin, , Does not apply, Continuous PRNRan Syed Kashif, MD    sodium bicarbonate 100 mEq/1000 mL D5W infusion, 125 mL/hr, Intravenous, Continuous, Annalee Kent MD, Last Rate: 125 mL/hr at 11/19/23 1230, 125 mL/hr at 11/19/23 1230    sodium chloride 0.9 % flush 10 mL, 10 mL, Intravenous, PRNRan Syed Kashif, MD    sodium chloride 0.9 % flush 10 mL, 10 mL, Intravenous, PRNRan Syed Kashif, MD    [COMPLETED] Insert Peripheral IV, , , Once **AND** sodium chloride 0.9 % flush 10 mL, 10 mL, Intravenous, PRN, Renny Tong MD, 10 mL at 11/16/23 1210    sodium chloride 0.9 % flush 10 mL, 10 mL, Intravenous, Q12H, Renny Tong MD, 10 mL at 11/19/23 2113    sodium chloride 0.9 % flush 10 mL, 10 mL, Intravenous, PRN, Renny Tong MD    sodium chloride 0.9 % infusion 40 mL, 40 mL, Intravenous, PRN, Renny Tong MD    tamsulosin (FLOMAX) 24 hr capsule 0.4 mg, 0.4 mg, Oral, Daily, Tristin Reece MD, 0.4 mg at 11/19/23 1236    traZODone (DESYREL) tablet 25 mg, 25 mg, Oral, Nightly, Renny Tong MD, 25 mg at 11/19/23 2112    vancomycin 500 mg/100 mL 0.9% NS IVPB ([facility]), 500 mg, Intravenous, Q24H, Tristin Reece MD    Assessment & Plan     Vomiting  Gallstones  Constipation  Dementia  Multiple medical issues   Continue treating severe constipation.  Once constipation has resolved, if patient continues to have vomiting will need HIDA scan        CAYETANO eZlaya  11/20/23  09:12 EST    Electronically signed by CAYETANO Zelaya, 11/20/23, 9:12 AM EST.

## 2023-11-20 NOTE — NURSING NOTE
During rounding at the start of the shift, the patient was noted as being more confused than baseline, though patient was on a Cardizem drip the HR increased to 198, dropping back to 150s. The BP dropped to 70/40. Rapid was initiated. Patients BP returned to normal limits, however HR remained in the 130s. Cardizem drip was increased to max dose of 15. Patient became more alert.   Patients BP stayed wnl throughout the shift. HR slowly dropped and drip was titrated as per ordered. HR now 107 with drip set at 5. Will continue to monitor.

## 2023-11-20 NOTE — CONSULTS
[facility]   GENERAL SURGERY  CONSULTATION    Patient Name: Selvin Ferguson  : 1935  MRN: [id number]  Primary Care Physician:  Kayla Renee APRN  Date of admission: 11/15/2023    Subjective     Reason for Consult:   Possible cholecystitis    Referring Physician:  Dr. Reece    HPI:  Selvin Ferguson is a 88 y.o. male who has dementia and is currently in the hospital being treated for MRSA UTI.  She cannot provide any history he does not communicate with me although he is alert.  The patient has been having vomiting.  The nurses told me they are not aware the patient is having any abdominal pain.  Because of the vomiting CT abdomen pelvis was done on 11/15/23, and the finding relevant for this consult is that there were gallstones and questionable pericholecystic inflammatory changes.  The CT scan also showed a large amount of stool throughout the colon and rectum.  The stool in the rectum measured almost 10 cm in greatest AP dimension.  I cannot tell from the medical record whether the patient has had a bowel movement since then and the nurse is present today indicate that they are not aware the patient has had a bowel movement.  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  WBC was 26 on  and is 11.9 when last checked yesterday morning bilirubin was 3.2 on  and is normal now.  AST and ALT have been checked 3 separate days while the patient the patient was in the hospital and have been normal except AST was 67 when last checked yesterday    Personal History   Allergies:  No Known Allergies    Home Medications:  Prior to Admission medications    Medication Sig Start Date End Date Taking? Authorizing Provider   amLODIPine (NORVASC) 10 MG tablet Take 1 tablet by mouth Daily.   Yes ProviderYahir MD   aspirin 81 MG EC tablet Take 1 tablet by mouth Daily.   Yes Yahir Freeman MD   clopidogrel  (PLAVIX) 75 MG tablet Take 1 tablet by mouth Daily. 9/26/23  Yes Zoila Jung MD   fexofenadine (ALLEGRA) 60 MG tablet Take 1 tablet by mouth Daily As Needed. 5/3/22  Yes Yahir Freeman MD   fluticasone (FLONASE) 50 MCG/ACT nasal spray 2 sprays by Each Nare route Daily As Needed.   Yes Yahir Freeman MD   hydrophilic ointment Apply 1 application  topically to the appropriate area as directed 2 (Two) Times a Day As Needed for Dry Skin.   Yes Yahir Freeman MD   lidocaine (LIDODERM) 5 % Place 1 patch on the skin as directed by provider Daily As Needed for Mild Pain.   Yes Yahir Freeman MD   lisinopril (PRINIVIL,ZESTRIL) 10 MG tablet Take 0.5 tablets by mouth Daily.   Yes Yahir Freeman MD   metoprolol succinate XL (TOPROL-XL) 25 MG 24 hr tablet Take 1 tablet by mouth Daily for 30 days. 9/24/23 11/15/23 Yes Zoila Jung MD   nitroglycerin (NITROSTAT) 0.4 MG SL tablet Take 1 tablet by mouth Every 5 (Five) Minutes As Needed.   Yes Yahir Freeman MD   rosuvastatin (CRESTOR) 40 MG tablet Take 1 tablet by mouth Daily.   Yes Yahir Freeman MD   traZODone (DESYREL) 50 MG tablet Take 0.5 tablets by mouth Every Night.   Yes Yahir Freeman MD   Darbepoetin New 40 MCG/0.4ML solution prefilled syringe Inject 40 mcg under the skin into the appropriate area as directed Every 14 (Fourteen) Days.    Yahir Freeman MD       Past Medical History:   Diagnosis Date    Dementia     Diabetes mellitus     Hyperlipidemia     Hypertension     Renal disorder        Past Surgical History:   Procedure Laterality Date    CATARACT EXTRACTION      COLONOSCOPY      COLONOSCOPY N/A 10/12/2023    Procedure: COLONOSCOPY;  Surgeon: Renny Tong MD;  Location: [facility] ENDOSCOPY;  Service: Gastroenterology;  Laterality: N/A;  DIVERTICULOSIS    CORONARY ARTERY BYPASS GRAFT      CYSTOSCOPY      ENDOSCOPY N/A 9/20/2023    Procedure: ESOPHAGOGASTRODUODENOSCOPY WITH COLD  BIOPSIES;  Surgeon: Renny Tong MD;  Location: [facility] ENDOSCOPY;  Service: Gastroenterology;  Laterality: N/A;  HIATAL HERNIA    ENTEROSCOPY SMALL BOWEL N/A 11/16/2023    Procedure: ENTEROSCOPY SMALL BOWEL WITH APC RIGHT COLON;  Surgeon: Renny Tong MD;  Location: [facility] ENDOSCOPY;  Service: Gastroenterology;  Laterality: N/A;  HIATAL HERNIA, AVM STOMACH    PROSTATE SURGERY         Social History:  reports that he quit smoking about 10 years ago. His smoking use included cigarettes. He smoked an average of 1 pack per day. He has never used smokeless tobacco. He reports that he does not currently use alcohol. He reports that he does not use drugs.    Family History: Family history is unknown by patient. Otherwise pertinent FHx was reviewed and not pertinent to current issue.    Review of Systems: Review of systems not obtainable    Objective   Vitals:   Temp:  [99.2 °F (37.3 °C)-99.9 °F (37.7 °C)] 99.9 °F (37.7 °C)  Heart Rate:  [112-135] 135  Resp:  [18] 18  BP: ()/(40-76) 132/42  Physical Exam   Constitutional: alert, does not follow commands.  does not answer questions or speak  Respiratory:  breathing not labored, respiratory effort appears normal  Cardiovascular:  tachy  Abdomen:  soft, no detectable tenderness  Musculoskeletal: moving all extremities symmetrically  Neurologic:  consistent with dementia    CBC          11/16/2023    00:28 11/16/2023    04:14 11/17/2023    04:10 11/18/2023    03:55   CBC   WBC 26.88  26.24  16.00  11.97    RBC 3.50  3.53  3.05  2.91    Hemoglobin 10.4  10.4  9.0  8.4    Hematocrit 30.6  31.3  26.5  25.8    MCV 87.4  88.7  86.9  88.7    MCH 29.7  29.5  29.5  28.9    MCHC 34.0  33.2  34.0  32.6    RDW 15.9  16.5  17.8  18.2    Platelets 60  59  41  36      CMP          11/16/2023    00:28 11/17/2023    04:10 11/18/2023    03:55 11/18/2023    10:58   CMP   Glucose 172  145  109  110    BUN 19  24  31  31    Creatinine 2.50  2.82  3.10  3.33    EGFR 24.1  20.9   18.6  17.1    Sodium 141  146  145  146    Potassium 3.4  3.2  4.1  3.8    Chloride 108  118  117  117    Calcium 8.8  8.6  8.4  8.6    Total Protein 7.8  7.0   7.5    Albumin 4.1  3.4   3.4    Globulin 3.7  3.6   4.1    Total Bilirubin 3.2  1.6   1.2    Alkaline Phosphatase 97  96   128    AST (SGOT) 25  26   67    ALT (SGPT) 12  13   35    Albumin/Globulin Ratio 1.1  0.9   0.8    BUN/Creatinine Ratio 7.6  8.5  10.0  9.3    Anion Gap 13.9  9.5  12.0  12.3        Lab Results   Lab Value Date/Time    LIPASE 109 (H) 11/19/2023 1139    LIPASE 25 11/15/2023 1410     Lab Results   Lab Value Date/Time    INR 1.46 (H) 11/16/2023 0414    INR 1.26 (H) 11/15/2023 1410     Lab Results   Lab Value Date/Time    LACTATE 1.5 11/18/2023 1058    LACTATE 1.5 11/15/2023 1731    LACTATE 2.1 (C) 11/15/2023 1410       IMAGING:  Imaging Results (Last 48 Hours)       Procedure Component Value Units Date/Time    US Renal Limited [[id number]] Collected: 11/19/23 1554     Updated: 11/19/23 1557    Narrative:      PROCEDURE: US RENAL LIMITED     COMPARISON: [facility], CT, CT ABDOMEN PELVIS WO CONTRAST, 11/15/2023, 15:24.     INDICATIONS: History of hydronephrosis     FINDINGS:   The right kidney measures 10.6 cm pole to pole, the left kidney measures 7.9 cm pole to pole.     Benign-appearing cysts are seen in the right kidney.       The left kidney has an atrophic appearance.       There is no evidence of hydronephrosis.     The urinary bladder is not distended.       Impression:         Bilateral renal ultrasound demonstrating no evidence of hydronephrosis.     Atrophic left kidney.               DANITA BUENO MD         Electronically Signed and Approved By: DANITA BUENO MD on 11/19/2023 at 15:54                     US Gallbladder [[id number]] Collected: 11/19/23 1551     Updated: 11/19/23 1555    Narrative:      PROCEDURE: US GALLBLADDER     COMPARISON:   [facility], CT, CT ABDOMEN PELVIS WO CONTRAST,  11/15/2023, 15:24.     INDICATIONS: Possible: Cholecystitis     TECHNIQUE: Right upper quadrant ultrasound was performed.     FINDINGS:   The liver is of normal size, and is uniformly echoic.     There is no intra or extrahepatic bile duct dilatation.  The common duct measures 4 mm in diameter,   which is within normal limits.     The gallbladder is not abnormally distended.  The gallbladder wall is thickened, measuring 5 mm.    Shadowing stones and sludge are evident.       Impression:         Right upper quadrant ultrasound demonstrating cholelithiasis.     Thickening of the gallbladder wall is concerning for cholecystitis.            DANITA BUENO MD         Electronically Signed and Approved By: DANITA BUENO MD on 11/19/2023 at 15:51                               Assessment & Plan   Assessment / Plan     Assessment:  Vomiting  Gallstones  Constipation  Dementia  Multiple medical issues    Plan:   There is no clear indication that the patient has cholecystitis.  The cause of the vomiting could be multifactorial.  It also certainly could be related to what appears to be severe constipation that as far as I ascertain has not been addressed yet.  Recommend enemas, laxative suppositories, and oral laxatives (will order) until patient has a large bowel movement.  Patient should then be on a strict bowel regimen.  If patient continues to have vomiting after he has a large bowel movement, or if he starts having localized right upper quadrant pain or elevation of LFTs, then recommend HIDA scan.      Juan F Jain MD  11/15/2023    Electronically signed by Juan F Jain MD, 11/19/23, 7:45 PM EST.

## 2023-11-21 LAB
ALBUMIN SERPL-MCNC: 3 G/DL (ref 3.5–5.2)
ALBUMIN/GLOB SERPL: 0.9 G/DL
ALP SERPL-CCNC: 122 U/L (ref 39–117)
ALT SERPL W P-5'-P-CCNC: 57 U/L (ref 1–41)
ANION GAP SERPL CALCULATED.3IONS-SCNC: 13.5 MMOL/L (ref 5–15)
ANISOCYTOSIS BLD QL: NORMAL
AST SERPL-CCNC: 116 U/L (ref 1–40)
BASOPHILS # BLD AUTO: 0.03 10*3/MM3 (ref 0–0.2)
BASOPHILS NFR BLD AUTO: 0.5 % (ref 0–1.5)
BILIRUB SERPL-MCNC: 0.9 MG/DL (ref 0–1.2)
BUN SERPL-MCNC: 45 MG/DL (ref 8–23)
BUN/CREAT SERPL: 10.5 (ref 7–25)
BURR CELLS BLD QL SMEAR: NORMAL
CALCIUM SPEC-SCNC: 7.7 MG/DL (ref 8.6–10.5)
CHLORIDE SERPL-SCNC: 114 MMOL/L (ref 98–107)
CO2 SERPL-SCNC: 21.5 MMOL/L (ref 22–29)
CREAT SERPL-MCNC: 4.29 MG/DL (ref 0.76–1.27)
DEPRECATED RDW RBC AUTO: 56.2 FL (ref 37–54)
EGFRCR SERPLBLD CKD-EPI 2021: 12.6 ML/MIN/1.73
EOSINOPHIL # BLD AUTO: 0.15 10*3/MM3 (ref 0–0.4)
EOSINOPHIL NFR BLD AUTO: 2.3 % (ref 0.3–6.2)
ERYTHROCYTE [DISTWIDTH] IN BLOOD BY AUTOMATED COUNT: 18.4 % (ref 12.3–15.4)
GLOBULIN UR ELPH-MCNC: 3.5 GM/DL
GLUCOSE SERPL-MCNC: 261 MG/DL (ref 65–99)
HCT VFR BLD AUTO: 22.3 % (ref 37.5–51)
HGB BLD-MCNC: 7.6 G/DL (ref 13–17.7)
HYPOCHROMIA BLD QL: NORMAL
IMM GRANULOCYTES # BLD AUTO: 0.09 10*3/MM3 (ref 0–0.05)
IMM GRANULOCYTES NFR BLD AUTO: 1.4 % (ref 0–0.5)
LYMPHOCYTES # BLD AUTO: 0.52 10*3/MM3 (ref 0.7–3.1)
LYMPHOCYTES NFR BLD AUTO: 8 % (ref 19.6–45.3)
MCH RBC QN AUTO: 29.7 PG (ref 26.6–33)
MCHC RBC AUTO-ENTMCNC: 34.1 G/DL (ref 31.5–35.7)
MCV RBC AUTO: 87.1 FL (ref 79–97)
MICROCYTES BLD QL: NORMAL
MONOCYTES # BLD AUTO: 0.11 10*3/MM3 (ref 0.1–0.9)
MONOCYTES NFR BLD AUTO: 1.7 % (ref 5–12)
NEUTROPHILS NFR BLD AUTO: 5.58 10*3/MM3 (ref 1.7–7)
NEUTROPHILS NFR BLD AUTO: 86.1 % (ref 42.7–76)
NRBC BLD AUTO-RTO: 0 /100 WBC (ref 0–0.2)
OVALOCYTES BLD QL SMEAR: NORMAL
PLATELET # BLD AUTO: 10 10*3/MM3 (ref 140–450)
PMV BLD AUTO: ABNORMAL FL
POIKILOCYTOSIS BLD QL SMEAR: NORMAL
POTASSIUM SERPL-SCNC: 3.2 MMOL/L (ref 3.5–5.2)
PROT SERPL-MCNC: 6.5 G/DL (ref 6–8.5)
RBC # BLD AUTO: 2.56 10*6/MM3 (ref 4.14–5.8)
SCHISTOCYTES BLD QL SMEAR: NORMAL
SMALL PLATELETS BLD QL SMEAR: NORMAL
SODIUM SERPL-SCNC: 149 MMOL/L (ref 136–145)
WBC MORPH BLD: NORMAL
WBC NRBC COR # BLD AUTO: 6.48 10*3/MM3 (ref 3.4–10.8)

## 2023-11-21 PROCEDURE — 25010000002 VANCOMYCIN 5 G RECONSTITUTED SOLUTION: Performed by: INTERNAL MEDICINE

## 2023-11-21 PROCEDURE — 85007 BL SMEAR W/DIFF WBC COUNT: CPT | Performed by: INTERNAL MEDICINE

## 2023-11-21 PROCEDURE — 85025 COMPLETE CBC W/AUTO DIFF WBC: CPT | Performed by: INTERNAL MEDICINE

## 2023-11-21 PROCEDURE — 25010000002 CEFEPIME PER 500 MG: Performed by: INTERNAL MEDICINE

## 2023-11-21 PROCEDURE — 80053 COMPREHEN METABOLIC PANEL: CPT | Performed by: INTERNAL MEDICINE

## 2023-11-21 PROCEDURE — 0 DEXTROSE 5 % SOLUTION 1,000 ML FLEX CONT: Performed by: INTERNAL MEDICINE

## 2023-11-21 RX ORDER — MIDODRINE HYDROCHLORIDE 5 MG/1
5 TABLET ORAL
Status: DISCONTINUED | OUTPATIENT
Start: 2023-11-21 | End: 2023-11-23 | Stop reason: HOSPADM

## 2023-11-21 RX ADMIN — DOCUSATE SODIUM 50MG AND SENNOSIDES 8.6MG 2 TABLET: 8.6; 5 TABLET, FILM COATED ORAL at 09:25

## 2023-11-21 RX ADMIN — MIDODRINE HYDROCHLORIDE 2.5 MG: 10 TABLET ORAL at 09:25

## 2023-11-21 RX ADMIN — SODIUM BICARBONATE 75 MEQ: 84 INJECTION, SOLUTION INTRAVENOUS at 05:47

## 2023-11-21 RX ADMIN — DILTIAZEM HYDROCHLORIDE 30 MG: 30 TABLET, FILM COATED ORAL at 09:11

## 2023-11-21 RX ADMIN — CEFEPIME 2000 MG: 2 INJECTION, POWDER, FOR SOLUTION INTRAVENOUS at 16:58

## 2023-11-21 RX ADMIN — DOCUSATE SODIUM 50MG AND SENNOSIDES 8.6MG 2 TABLET: 8.6; 5 TABLET, FILM COATED ORAL at 21:02

## 2023-11-21 RX ADMIN — MIDODRINE HYDROCHLORIDE 5 MG: 5 TABLET ORAL at 11:40

## 2023-11-21 RX ADMIN — TRAZODONE HYDROCHLORIDE 25 MG: 50 TABLET ORAL at 21:03

## 2023-11-21 RX ADMIN — CETIRIZINE HYDROCHLORIDE 10 MG: 10 TABLET, FILM COATED ORAL at 09:25

## 2023-11-21 RX ADMIN — Medication 10 ML: at 21:07

## 2023-11-21 RX ADMIN — SODIUM BICARBONATE 75 MEQ: 84 INJECTION, SOLUTION INTRAVENOUS at 00:25

## 2023-11-21 RX ADMIN — VANCOMYCIN HYDROCHLORIDE 500 MG: 5 INJECTION, POWDER, LYOPHILIZED, FOR SOLUTION INTRAVENOUS at 11:40

## 2023-11-21 RX ADMIN — POLYETHYLENE GLYCOL 3350 17 G: 17 POWDER, FOR SOLUTION ORAL at 11:40

## 2023-11-21 RX ADMIN — FOLIC ACID 1 MG: 1 TABLET ORAL at 09:26

## 2023-11-21 RX ADMIN — BISACODYL 10 MG: 10 SUPPOSITORY RECTAL at 09:26

## 2023-11-21 RX ADMIN — TAMSULOSIN HYDROCHLORIDE 0.4 MG: 0.4 CAPSULE ORAL at 09:25

## 2023-11-21 RX ADMIN — DILTIAZEM HYDROCHLORIDE 30 MG: 30 TABLET, FILM COATED ORAL at 21:05

## 2023-11-21 RX ADMIN — Medication 10 ML: at 09:26

## 2023-11-21 RX ADMIN — MIDODRINE HYDROCHLORIDE 5 MG: 5 TABLET ORAL at 16:59

## 2023-11-21 RX ADMIN — SODIUM BICARBONATE 75 MEQ: 84 INJECTION, SOLUTION INTRAVENOUS at 10:45

## 2023-11-21 RX ADMIN — METOPROLOL SUCCINATE 25 MG: 25 TABLET, EXTENDED RELEASE ORAL at 09:31

## 2023-11-21 NOTE — PLAN OF CARE
Goal Outcome Evaluation:  Plan of Care Reviewed With: patient, spouse        Progress: no change  Outcome Evaluation: Pt confused-A&O x 1 (person) (HX:dementia). Turned Q2hr this shift. No complaints of pain or discomfort noted. Absent of injury this shift.

## 2023-11-21 NOTE — SIGNIFICANT NOTE
11/21/23 1436   Plan   Plan SW notified that family does not want patient to return to [place]. Family would like patient to go to a facility under [facility]. [facility] in contact with the [facility], andfamily is agreeable for this. Authorization started with the [facility] awaiting approval. Hopefully will have approval tomorrow. KRZYSZTOF notified RN and MD.   Final Discharge Disposition Code 03 - skilled nursing facility (SNF)

## 2023-11-21 NOTE — SIGNIFICANT NOTE
Wound Eval / Progress Noted    LISA Albrecht     Patient Name: Selvin Ferguson  : 1935  MRN: [id number]  Today's Date: 2023                 Admit Date: 11/15/2023    Visit Dx:    ICD-10-CM ICD-9-CM   1. Sepsis without acute organ dysfunction, due to unspecified organism  A41.9 038.9     995.91   2. Acute on chronic anemia  D64.9 285.9   3. Acute UTI (urinary tract infection)  N39.0 599.0   4. Acute upper GI bleed  K92.2 578.9   5. Constipation, unspecified constipation type  K59.00 564.00   6. Calculus of gallbladder without cholecystitis without obstruction  K80.20 574.20   7. Difficulty walking  R26.2 719.7         Sepsis    Acute renal failure superimposed on stage 1 chronic kidney disease    Urinary retention    Chronic kidney disease, stage IV (severe)    Chronic anemia    Acute upper GI bleed    Metabolic acidosis    Myelodysplastic syndrome    Acute constipation    Gallstones        Past Medical History:   Diagnosis Date    Dementia     Diabetes mellitus     Hyperlipidemia     Hypertension     Renal disorder         Past Surgical History:   Procedure Laterality Date    CATARACT EXTRACTION      COLONOSCOPY      COLONOSCOPY N/A 10/12/2023    Procedure: COLONOSCOPY;  Surgeon: Renny Tong MD;  Location: [facility] ENDOSCOPY;  Service: Gastroenterology;  Laterality: N/A;  DIVERTICULOSIS    CORONARY ARTERY BYPASS GRAFT      CYSTOSCOPY      ENDOSCOPY N/A 2023    Procedure: ESOPHAGOGASTRODUODENOSCOPY WITH COLD BIOPSIES;  Surgeon: Renny Tong MD;  Location: [facility] ENDOSCOPY;  Service: Gastroenterology;  Laterality: N/A;  HIATAL HERNIA    ENTEROSCOPY SMALL BOWEL N/A 2023    Procedure: ENTEROSCOPY SMALL BOWEL WITH APC RIGHT COLON;  Surgeon: Renny Tong MD;  Location: [facility] ENDOSCOPY;  Service: Gastroenterology;  Laterality: N/A;  HIATAL HERNIA, AVM STOMACH    PROSTATE SURGERY           Physical Assessment:  Wound 23 1038 sacral spine (Active)   Wound Image    11/21/23 0950   Pressure Injury Stage 2 11/21/23 0950   Dressing Appearance intact;dry 11/21/23 0950   Closure None 11/21/23 0950   Base non-blanchable;red;moist 11/21/23 0950   Red (%), Wound Tissue Color 100 11/21/23 0950   Periwound intact;dry 11/21/23 0950   Periwound Temperature warm 11/21/23 0950   Periwound Skin Turgor soft 11/21/23 0950   Edges open 11/21/23 0950   Wound Length (cm) 0.6 cm 11/21/23 0950   Wound Width (cm) 0.2 cm 11/21/23 0950   Wound Surface Area (cm^2) 0.12 cm^2 11/21/23 0950   Drainage Characteristics/Odor serosanguineous 11/21/23 0950   Drainage Amount scant 11/21/23 0950   Care, Wound cleansed with;sterile normal saline 11/21/23 0950   Dressing Care open to air;skin barrier agent applied 11/21/23 0950   Periwound Care barrier ointment applied 11/21/23 0950       Wound 11/21/23 1056 Left gluteal Pressure Injury (Active)   Wound Image   11/21/23 0950   Pressure Injury Stage DTPI 11/21/23 0950   Dressing Appearance open to air 11/21/23 0950   Closure None 11/21/23 0950   Base non-blanchable;dry;maroon/purple 11/21/23 0950   Periwound intact;dry 11/21/23 0950   Periwound Temperature warm 11/21/23 0950   Periwound Skin Turgor soft 11/21/23 0950   Edges rolled/closed 11/21/23 0950   Drainage Amount none 11/21/23 0950   Care, Wound cleansed with;sterile normal saline 11/21/23 0950   Dressing Care open to air;skin barrier agent applied 11/21/23 0950   Periwound Care barrier ointment applied 11/21/23 0950        Wound Check / Follow-up:  Patient seen today for a wound consult. Patient was incontinent of stool prior to arrival; provided incontinence care.   Patient with DTPI to the left gluteal aspect, tissue is intact. Periwound is soft and dry. Sacrum with a stage II pressure injury with red moist, non-blanchable tissue. Periwound is reddened but remains blanchable at this time. Bilateral heels / elbows intact and dry; no open wounds, all tissue blanchable. Cleansed with NS. Recommend TID / PRN  application of moisture barrier cream and to leave open to air. Implement Q2H turns and offload at all times. Keep the patient clean and free from all moisture.     Impression: DTPI left gluteal aspect, Stage II pressure injury to coccyx    Short term goals:  regain skin integrity, skin protection, moisture management, pressure reduction, topical treatment    Rajni Romero RN    11/21/2023    10:57 EST

## 2023-11-21 NOTE — PROGRESS NOTES
Date of service: 11/21/2023    Subjective: Awake.  Nonverbal.  He answered by nodding his head yes or no.  No chest pain, SOB, palpitations or dizziness.    Review of systems: No headache, nausea, vomiting or abdominal pain.    Physical examination: He was in no pain or distress.  Vital signs: Reviewed  HEENT: Sclerae: Nonicteric  Neck: No JVD or carotid bruit.  Chest: CTA  Cardiac: RRR with occasional irregularities.  No murmurs or S3 gallop  Abdomen: Soft and nontender.  No megalies or masses  Extremities: No edema, cyanosis or clubbing.  Pulse intact  Neuro: Alert, oriented, no facial droop.    Records: Reviewed     Assessment and plan     1.  Telemetry: Sinus rhythm with intermittent nonsustained SVT.  Continue telemetry.  2.  Hypertension  3.  DM  4.  Chronic renal disease stage III-IV  5.  Intermittent borderline blood pressure.  Change midodrine to 5 mg p.o. 3 times daily  6.  Continue Toprol XL 25 mg p.o. twice daily and Cardizem 30 mg p.o. twice daily.  7.  Hypokalemia.  Will give KCl 40 mEq p.o. x1  8.  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  Iron deficiency anemia, status post iron transfusion  9.  Thrombocytopenia, per Dr. Reece management  10.  Cholelithiasis  11.  DNR per his RN

## 2023-11-21 NOTE — DISCHARGE SUMMARY
[facility]         DISCHARGE SUMMARY    Patient Name: Selvin Ferguson  : 1935  MRN: [id number]    Date of Admission: 11/15/2023  Date of Discharge:  2023  Primary Care Physician: Kayla Renee APRN    Consults       Date and Time Order Name Status Description    2023 11:30 AM Inpatient General Surgery Consult Completed     2023 11:39 AM Inpatient Nephrology Consult      2023  7:19 PM Inpatient Urology Consult Completed     2023 10:50 AM Inpatient Cardiology Consult      11/15/2023  7:41 PM Inpatient Gastroenterology Consult      11/15/2023  6:56 PM Inpatient General Surgery Consult Completed     11/15/2023  4:58 PM IP General Consult (Use specialty-specific consult if known)      11/15/2023  4:34 PM Gastroenterology (on-call MD unless specified) Completed     11/15/2023  4:34 PM Surgery (on-call MD unless specified)              Presenting Problem:   Acute upper GI bleed [K92.2]  Calculus of gallbladder without cholecystitis without obstruction [K80.20]  Acute UTI (urinary tract infection) [N39.0]  Sepsis [A41.9]  Constipation, unspecified constipation type [K59.00]  Acute on chronic anemia [D64.9]  Sepsis without acute organ dysfunction, due to unspecified organism [A41.9]    Active and Resolved Hospital Problems:  Active Hospital Problems    Diagnosis POA    **Sepsis [A41.9] Yes    Metabolic acidosis [E87.20] Unknown    Myelodysplastic syndrome [D46.9] Unknown    Acute constipation [K59.00] Unknown    Gallstones [K80.20] Unknown    Acute upper GI bleed [K92.2] Unknown    Chronic anemia [D64.9] Yes    Chronic kidney disease, stage IV (severe) [N18.4] Yes    Urinary retention [R33.9] Yes    Acute renal failure superimposed on stage 1 chronic kidney disease [N17.9, N18.1] Yes      Resolved Hospital Problems   No resolved problems to display.         Hospital Course     Hospital Course:  Selvin Ferguson is a 88 y.o. 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 has not responded to the previous treatment given in the form of darbepoetin alpha he is not a candidate to receive any chemotherapy he also has very advanced disease with recurrent GI bleeding multiple AVMs has had several colonoscopies and upper endoscopies but keeps forgetting bleeding episodes he also has advanced cardiac illness with coronary artery disease but he cannot be put on any anticoagulation because of the recurrent GI bleeding he has also issues with his prostate and urinary retention and recurrent infections at this time was admitted mainly because he was vomiting blood EGD showed multiple AVMs which were anticoagulate he was seen by nephrology because of the worsening of kidney functions and no further treatment was recommended he also has had urology consultation and they also recommended no further treatment he had EGD and gastroenterology also has recommended not to do anything more his poor prognosis was explained to the 5 and because there are no chances of him getting any better it has been decided to keep on a comfort care making DNR and not to bring him back again to the hospital today and he will be going to the nursing home after that he will be moved to [facility] nursing home for comfort care      DISCHARGE Follow Up Recommendations for labs and diagnostics: patient with advanced kidney disease myelodysplastic syndrome      Pertinent  and/or Most Recent Results     LAB RESULTS:      Lab 11/21/23  0510 11/18/23  1058 11/18/23  0355 11/17/23  0410 11/16/23  0414 11/16/23  0028 11/15/23  1731 11/15/23  1410   WBC 6.48  --  11.97* 16.00* 26.24* 26.88*  --  15.03*   HEMOGLOBIN 7.6*  --  8.4* 9.0* 10.4* 10.4*  --  6.6*   HEMATOCRIT 22.3*  --  25.8* 26.5* 31.3* 30.6*  --  19.9*   PLATELETS 10*  --  36* 41* 59* 60*  --  68*   NEUTROS  ABS 5.58  --  10.68* 14.58* 24.31*  --   --  13.82*   IMMATURE GRANS (ABS) 0.09*  --  0.07* 0.20* 0.21*  --   --  0.12*   LYMPHS ABS 0.52*  --  0.82 0.80 1.02  --   --  0.51*   MONOS ABS 0.11  --  0.20 0.30 0.54  --   --  0.47   EOS ABS 0.15  --  0.12 0.03 0.00  --   --  0.03   MCV 87.1  --  88.7 86.9 88.7 87.4  --  89.2   PROCALCITONIN  --  5.31*  --   --   --   --   --   --    LACTATE  --  1.5  --   --   --   --  1.5 2.1*   PROTIME  --   --   --   --  18.0*  --   --  16.1*         Lab 11/21/23  0510 11/20/23  0351 11/18/23  1058 11/18/23  0355 11/17/23  0410   SODIUM 149* 153* 146* 145 146*   POTASSIUM 3.2* 2.7* 3.8 4.1 3.2*   CHLORIDE 114* 119* 117* 117* 118*   CO2 21.5* 19.2* 16.7* 16.0* 18.5*   ANION GAP 13.5 14.8 12.3 12.0 9.5   BUN 45* 32* 31* 31* 24*   CREATININE 4.29* 3.65* 3.33* 3.10* 2.82*   EGFR 12.6* 15.3* 17.1* 18.6* 20.9*   GLUCOSE 261* 265* 110* 109* 145*   CALCIUM 7.7* 8.3* 8.6 8.4* 8.6   MAGNESIUM  --  2.0  --   --   --          Lab 11/21/23  0510 11/20/23  0351 11/19/23  1139 11/18/23  1058 11/17/23  0410 11/16/23  0028 11/15/23  1410   TOTAL PROTEIN 6.5 7.1  --  7.5 7.0 7.8 8.1   ALBUMIN 3.0* 3.3*  --  3.4* 3.4* 4.1 4.2   GLOBULIN 3.5 3.8  --  4.1 3.6 3.7 3.9   ALT (SGPT) 57* 51*  --  35 13 12 11   AST (SGOT) 116* 91*  --  67* 26 25 23   BILIRUBIN 0.9 1.0  --  1.2 1.6* 3.2* 1.5*   ALK PHOS 122* 146*  --  128* 96 97 98   AMYLASE  --  76  --   --   --   --   --    LIPASE  --   --  109*  --   --   --  25         Lab 11/16/23  0414 11/15/23  1410   PROTIME 18.0* 16.1*   INR 1.46* 1.26*             Lab 11/16/23  0414 11/15/23  1503   IRON 45*  --    IRON SATURATION (TSAT) 19*  --    TIBC 241*  --    TRANSFERRIN 162*  --    FERRITIN 2,778.00*  --    FOLATE 4.36*  --    VITAMIN B 12 >2,000*  --    ABO TYPING  --  O   RH TYPING  --  Positive   ANTIBODY SCREEN  --  Negative         Brief Urine Lab Results  (Last result in the past 365 days)        Color   Clarity   Blood   Leuk Est   Nitrite   Protein    CREAT   Urine HCG        11/15/23 1604 Yellow   Turbid   Moderate (2+)   Large (3+)   Negative   >=300 mg/dL (3+)                 Microbiology Results (last 10 days)       Procedure Component Value - Date/Time    Blood Culture - Blood, Hand, Right [[id number]]  (Normal) Collected: 11/18/23 0955    Lab Status: Preliminary result Specimen: Blood from Hand, Right Updated: 11/21/23 1015     Blood Culture No growth at 3 days    Blood Culture - Blood, Hand, Right [[id number]]  (Normal) Collected: 11/18/23 0905    Lab Status: Preliminary result Specimen: Blood from Hand, Right Updated: 11/21/23 0915     Blood Culture No growth at 3 days    Urine Culture - Urine, Urine, Clean Catch [[id number]]  (Abnormal)  (Susceptibility) Collected: 11/15/23 1604    Lab Status: Final result Specimen: Urine, Clean Catch Updated: 11/18/23 0341     Urine Culture >100,000 CFU/mL Staphylococcus aureus, MRSA     Comment:   Methicillin resistant Staph aureus, patient may be an isolation risk.  Except in cases with genitourinary instrumentation, Staphylococcus aureus bacteriuria is associated with S. aureus bacteremia. Blood cultures are recommended.       Narrative:      Colonization of the urinary tract without infection is common. Treatment is discouraged unless the patient is symptomatic, pregnant, or undergoing an invasive urologic procedure.    Susceptibility        Staphylococcus aureus, MRSA      ESTRELLITA      Nitrofurantoin Susceptible      Oxacillin Resistant      Tetracycline Susceptible      Trimethoprim + Sulfamethoxazole Susceptible      Vancomycin Susceptible                       Susceptibility Comments       Staphylococcus aureus, MRSA    This isolate does not demonstrate inducible clindamycin resistance in vitro.                 COVID-19, FLU A/B, RSV PCR 1 HR TAT - Swab, Nasopharynx [[id number]]  (Normal) Collected: 11/15/23 1544    Lab Status: Final result Specimen: Swab from Nasopharynx Updated: 11/15/23 1622     COVID19 Not Detected      Influenza A PCR Not Detected     Influenza B PCR Not Detected     RSV, PCR Not Detected    Narrative:      Fact sheet for providers: https://www.fda.gov/media/198813/download    Fact sheet for patients: https://www.fda.gov/media/363964/download    Test performed by PCR.    Blood Culture - Blood, Arm, Left [[id number]]  (Normal) Collected: 11/15/23 1427    Lab Status: Final result Specimen: Blood from Arm, Left Updated: 11/20/23 1446     Blood Culture No growth at 5 days    Blood Culture - Blood, Arm, Right [[id number]]  (Normal) Collected: 11/15/23 1410    Lab Status: Final result Specimen: Blood from Arm, Right Updated: 11/20/23 1431     Blood Culture No growth at 5 days            US Renal Limited    Result Date: 11/19/2023  Impression:   Bilateral renal ultrasound demonstrating no evidence of hydronephrosis.  Atrophic left kidney.      DANITA BUENO MD       Electronically Signed and Approved By: DANITA BUENO MD on 11/19/2023 at 15:54             US Gallbladder    Result Date: 11/19/2023  Impression:   Right upper quadrant ultrasound demonstrating cholelithiasis.  Thickening of the gallbladder wall is concerning for cholecystitis.     DANITA BUENO MD       Electronically Signed and Approved By: DANITA BUENO MD on 11/19/2023 at 15:51                      Results for orders placed during the hospital encounter of 09/18/23    Adult Transthoracic Echo Complete W/ Cont if Necessary Per Protocol    Interpretation Summary    Left ventricular ejection fraction appears to be 56 - 60%.    Left ventricular wall thickness is consistent with mild concentric hypertrophy.    The left atrial cavity is mildly dilated.    Moderate tricuspid valve regurgitation is present.    Estimated right ventricular systolic pressure from tricuspid regurgitation is mildly elevated (35-45 mmHg).    There were no apparent intracardiac masses, vegetations or thrombi.      Labs Pending at Discharge:  Pending Labs       Order Current Status     Blood Culture - Blood, Hand, Right Preliminary result    Blood Culture - Blood, Hand, Right Preliminary result              Discharge Details        Discharge Medications        Continue These Medications        Instructions Start Date   fexofenadine 60 MG tablet  Commonly known as: ALLEGRA   60 mg, Oral, Daily PRN      fluticasone 50 MCG/ACT nasal spray  Commonly known as: FLONASE   2 sprays, Each Nare, Daily PRN      hydrophilic ointment   1 application , Topical, 2 Times Daily PRN      lidocaine 5 %  Commonly known as: LIDODERM   1 patch, Transdermal, Daily PRN      metoprolol succinate XL 25 MG 24 hr tablet  Commonly known as: TOPROL-XL   25 mg, Oral, Daily      nitroglycerin 0.4 MG SL tablet  Commonly known as: NITROSTAT   0.4 mg, Oral, Every 5 Minutes PRN      rosuvastatin 40 MG tablet  Commonly known as: CRESTOR   40 mg, Oral, Daily      traZODone 50 MG tablet  Commonly known as: DESYREL   25 mg, Oral, Nightly             Stop These Medications      amLODIPine 10 MG tablet  Commonly known as: NORVASC     aspirin 81 MG EC tablet     clopidogrel 75 MG tablet  Commonly known as: PLAVIX     Darbepoetin New 40 MCG/0.4ML solution prefilled syringe     lisinopril 10 MG tablet  Commonly known as: PRINIVIL,ZESTRIL              No Known Allergies      Discharge Disposition:  Skilled Nursing Facility (DC - External)    Diet:  Hospital:  Diet Order   Procedures    NPO Diet NPO Type: Sips with Meds         Discharge Activity:         CODE STATUS:  Code Status and Medical Interventions:   Ordered at: 11/21/23 0813     Medical Intervention Limits:    NO intubation (DNI)    NO artificial nutrition    NO cardioversion    NO dialysis    NO NIPPV (Non-Invasive Positive Pressure Ventilation)    NO vasopressors     Code Status (Patient has no pulse and is not breathing):    No CPR (Do Not Attempt to Resuscitate)     Medical Interventions (Patient has pulse or is breathing):    Limited Support         Future Appointments    Date Time Provider Department Center   1/23/2024  1:30 PM Rosy Palomino APRN [facility] [place] [facility]           Time spent on Discharge including face to face service:  45 every minutes    Electronically signed by Tristin Reece MD, 11/21/23, 1:09 PM EST.    Part of this note may be an electronic transcription/translation of spoken language to printed text using the Dragon Dictation System.

## 2023-11-21 NOTE — PROGRESS NOTES
[facility]     Progress Note    Patient Name: Selvin Ferguson  : 1935  MRN: [id number]  Primary Care Physician:  Kayla Renee APRN  Date of admission: 11/15/2023    Subjective  patient was lethargic this morning and did not have any new issues renal function is worse secondary to ATN from hypotension.   Family may decide to withdraw all care    Review of Systems   Cannot be obtained    Objective   Objective     Vitals:   Temp:  [98.5 °F (36.9 °C)-100.8 °F (38.2 °C)] 99.9 °F (37.7 °C)  Heart Rate:  [104-120] 110  Resp:  [18-20] 20  BP: ()/(40-78) 102/45  Physical Exam       Eyes:   PERRLA, sclerae anicteric, no conjunctival injection   HEENT:  Moist mucous membranes, no nasal or eye discharge, no throat congestion   Neck:   Supple, no thyromegaly, no lymphadenopathy, trachea midline, no elevated JVD   Respiratory:  Clear to auscultation bilaterally, nonlabored respirations    Cardiovascular: RRR, no murmurs, rubs, or gallops, palpable pedal pulses bilaterally, No bilateral ankle edema   Gastrointestinal: Positive bowel sounds, soft, nontender, nondistended, no organomegaly   Musculoskeletal:  No clubbing or cyanosis to extremities,muscle wasting, joint swelling, muscle weakness             Skin:                      No rashes, bruising, skin ulcers, petechiae or ecchymosis    Result Review    Result Review:  I have personally reviewed the results from the time of this admission to 2023 12:10 EST and agree with these findings:  []  Laboratory  []  Microbiology  []  Radiology  []  EKG/Telemetry   []  Cardiology/Vascular   []  Pathology  []  Old records  []  Other:    Assessment & Plan   Assessment / Plan       Active Hospital Problems:    Active Hospital Problems    Diagnosis  POA    **Sepsis [A41.9]  Yes    Metabolic acidosis [E87.20]  Unknown    Myelodysplastic syndrome [D46.9]  Unknown     Severe anemia      Acute constipation [K59.00]  Unknown    Gallstones [K80.20]  Unknown    Acute  upper GI bleed [K92.2]  Unknown    Chronic anemia [D64.9]  Yes    Chronic kidney disease, stage IV (severe) [N18.4]  Yes      Baseline creatinine 2.0-2.4  Metabolic acidosis secondary to acute on chronic renal failure  Acute renal failure secondary to hypotension and dehydration because of extremely poor appetite and patient has been vomiting      Urinary retention [R33.9]  Yes    Acute renal failure superimposed on stage 1 chronic kidney disease [N17.9, N18.1]  Yes     Patient's baseline creatinine is around 2 and he follows with Dr. 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  Patient also has a metabolic acidosis secondary to chronic kidney disease stage IIIb/IV  Urine analysis unremarkable  Patient has a history of hydronephrosis         Plan:    Continue IV fluids   Renal function is getting worse secondary to hypotension induced ATN   Family may withdraw all care and go for comfort care otherwise most likely will end up requiring dialysis       Electronically signed by Annalee Kent MD, 11/21/23, 12:10 PM EST.

## 2023-11-21 NOTE — PROGRESS NOTES
[facility]     Progress Note    Patient Name: Selvin Ferguson  : 1935  MRN: [id number]  Primary Care Physician:  Kayla Renee APRN  Date of admission: 11/15/2023    Subjective   Subjective     Chief Complaint: Had a long discussion with his wife and explained to her that his myelodysplastic syndrome is worse he is not responding to any treatment and he is not a candidate to give him chemotherapy he is also, demented has dementia does not really know what is going on, Agustin catheter has been removed, discussed regarding not to give any artificial breathing but continue on the antiarrhythmic drugs at this time, we will get him to the nursing home and then we will continue the comfort measures fortunately patient does not have any painful condition we will be just withdrawing the treatments and not give him any active aggressive measures, he is already waiting for a bed to be available at [facility] for comfort care    HPI: Patient waiting for comfort care bed available at rehab in the meantime if it is possible to get [facility] we will try to send him back there his wife is indicated not to give him.  Aggressive treatment and to bring back to the hospital    Review of Systems   All systems were reviewed and negative except for: Has been reviewed    Objective   Objective     Vitals:   Temp:  [98.5 °F (36.9 °C)-100.8 °F (38.2 °C)] 98.8 °F (37.1 °C)  Heart Rate:  [106-120] 110  Resp:  [18-20] 20  BP: ()/(34-78) 100/44    Physical Exam    Constitutional: Awake, alert   Eyes: PERRLA, sclerae anicteric, no conjunctival injection   HENT: NCAT, mucous membranes moist   Neck: Supple, no thyromegaly, no lymphadenopathy, trachea midline   Respiratory: Clear to auscultation bilaterally, nonlabored respirations    Cardiovascular: RRR, no murmurs, rubs, or gallops, palpable pedal pulses bilaterally   Gastrointestinal: Positive bowel sounds, soft, nontender,  nondistended   Musculoskeletal: No bilateral ankle edema, no clubbing or cyanosis to extremities   Psychiatric: Appropriate affect, cooperative   Neurologic: Oriented x 3, strength symmetric in all extremities, Cranial Nerves grossly intact to confrontation, speech clear   Skin: No rashes   General condition remains the same he is alert but.  Episodes of confusion able to eat and able to take oral medication  Result Review    Result Review:  I have personally reviewed the results from the time of this admission to 11/21/2023 08:14 EST and agree with these findings:  [x]  Laboratory  []  Microbiology  []  Radiology  []  EKG/Telemetry   []  Cardiology/Vascular   []  Pathology  []  Old records  []  Other:  Most notable findings include: Has been reviewed and discussed with his wife    Assessment & Plan   Assessment / Plan     Brief Patient Summary:  Selvin Ferguson is a 88 y.o. male who patient to be started on comfort measures patient has dementia recurrent GI bleeding recurrent infections and has worsening of his myelodysplastic syndrome    Active Hospital Problems:  Active Hospital Problems    Diagnosis     **Sepsis     Metabolic acidosis     Myelodysplastic syndrome     Acute constipation     Gallstones     Acute upper GI bleed     Chronic anemia     Chronic kidney disease, stage IV (severe)     Urinary retention     Acute renal failure superimposed on stage 1 chronic kidney disease        Plan:   Comfort measures to be continued no active treatment fortunately patient does not have any pain so does not require any pain management    DVT prophylaxis:  Mechanical DVT prophylaxis orders are present.    CODE STATUS:   Medical Intervention Limits: NO intubation (DNI); NO artificial nutrition; NO cardioversion; NO dialysis; NO NIPPV (Non-Invasive Positive Pressure Ventilation); NO vasopressors  Code Status (Patient has no pulse and is not breathing): No CPR (Do Not Attempt to Resuscitate)  Medical Interventions  (Patient has pulse or is breathing): Limited Support    Disposition:  I expect patient to be discharged we will try to get  involved to get the placement to the nursing home.    Electronically signed by Tristin Reece MD, 11/21/23, 8:14 AM EST.      Part of this note may be an electronic transcription/translation of spoken language to printed text using the Dragon Dictation System.

## 2023-11-21 NOTE — CONSULTS
The patient is on 4[facility], I coordinated with the unit  and the patient's spouse. The patient is currently at [facility] and has requested to move [facility] and be a do not rehospitalize and move to comfort care there under their program. Unit CM is currently working with [facility] to determine if a bed is available. The spouse is not currently at the hospital at this time. An EMS DNR is needed if she arrives.     -Debby GTZ, RN, [facility]   Palliative Care    11/21/2023 10:34 EST

## 2023-11-22 VITALS
HEART RATE: 107 BPM | TEMPERATURE: 98.9 F | WEIGHT: 184.08 LBS | RESPIRATION RATE: 18 BRPM | HEIGHT: 71 IN | BODY MASS INDEX: 25.77 KG/M2 | SYSTOLIC BLOOD PRESSURE: 107 MMHG | DIASTOLIC BLOOD PRESSURE: 48 MMHG | OXYGEN SATURATION: 92 %

## 2023-11-22 PROBLEM — A41.02 SEPSIS DUE TO METHICILLIN RESISTANT STAPHYLOCOCCUS AUREUS (MRSA) WITHOUT ACUTE ORGAN DYSFUNCTION: Status: ACTIVE | Noted: 2023-11-22

## 2023-11-22 PROCEDURE — 0 DEXTROSE 5 % SOLUTION: Performed by: STUDENT IN AN ORGANIZED HEALTH CARE EDUCATION/TRAINING PROGRAM

## 2023-11-22 PROCEDURE — 25010000002 CEFEPIME PER 500 MG: Performed by: INTERNAL MEDICINE

## 2023-11-22 PROCEDURE — 0 DEXTROSE 5 % SOLUTION 1,000 ML FLEX CONT: Performed by: INTERNAL MEDICINE

## 2023-11-22 RX ORDER — DEXTROSE MONOHYDRATE 50 MG/ML
100 INJECTION, SOLUTION INTRAVENOUS CONTINUOUS
Status: DISCONTINUED | OUTPATIENT
Start: 2023-11-22 | End: 2023-11-23 | Stop reason: HOSPADM

## 2023-11-22 RX ADMIN — DEXTROSE MONOHYDRATE 100 ML/HR: 50 INJECTION, SOLUTION INTRAVENOUS at 11:13

## 2023-11-22 RX ADMIN — MIDODRINE HYDROCHLORIDE 5 MG: 5 TABLET ORAL at 17:10

## 2023-11-22 RX ADMIN — MIDODRINE HYDROCHLORIDE 5 MG: 5 TABLET ORAL at 13:04

## 2023-11-22 RX ADMIN — MIDODRINE HYDROCHLORIDE 5 MG: 5 TABLET ORAL at 08:37

## 2023-11-22 RX ADMIN — DILTIAZEM HYDROCHLORIDE 30 MG: 30 TABLET, FILM COATED ORAL at 17:10

## 2023-11-22 RX ADMIN — Medication 10 ML: at 08:39

## 2023-11-22 RX ADMIN — SODIUM BICARBONATE 75 MEQ: 84 INJECTION, SOLUTION INTRAVENOUS at 00:38

## 2023-11-22 RX ADMIN — DILTIAZEM HYDROCHLORIDE 30 MG: 30 TABLET, FILM COATED ORAL at 10:48

## 2023-11-22 RX ADMIN — TAMSULOSIN HYDROCHLORIDE 0.4 MG: 0.4 CAPSULE ORAL at 08:38

## 2023-11-22 RX ADMIN — ACETAMINOPHEN 650 MG: 325 TABLET ORAL at 01:46

## 2023-11-22 RX ADMIN — BISACODYL 10 MG: 10 SUPPOSITORY RECTAL at 08:39

## 2023-11-22 RX ADMIN — CEFEPIME 2000 MG: 2 INJECTION, POWDER, FOR SOLUTION INTRAVENOUS at 17:10

## 2023-11-22 NOTE — PLAN OF CARE
Goal Outcome Evaluation:  Plan of Care Reviewed With: patient      Patient is alert to self. Patient is SR on the monitor. On room air. Patient is anticipating discharge to [facility] today.Will continue plan of care.

## 2023-11-22 NOTE — PROGRESS NOTES
[facility]     Progress Note    Patient Name: Selvin Ferguson  : 1935  MRN: [id number]  Primary Care Physician:  Kayla Renee, CAYETANO  Date of admission: 11/15/2023    Subjective   Patient continues to have intermittent episodes of altered sensorium  Was seen by palliative care with possible transition to hospice  Renal dysfunction continues to worsen  Continues to be on IV fluids    Scheduled Meds:bisacodyl, 10 mg, Rectal, Daily  cefepime, 2,000 mg, Intravenous, Q24H  cetirizine, 10 mg, Oral, Daily  dilTIAZem, 30 mg, Oral, BID  epoetin gui/gui-epbx, 40,000 Units, Subcutaneous, Weekly  folic acid, 1 mg, Oral, Daily  metoprolol succinate XL, 25 mg, Oral, Q12H  midodrine, 5 mg, Oral, TID AC  senna-docusate sodium, 2 tablet, Oral, BID  sodium chloride, 10 mL, Intravenous, Q12H  tamsulosin, 0.4 mg, Oral, Daily  traZODone, 25 mg, Oral, Nightly      Continuous Infusions:dilTIAZem, 5 mg/hr, Last Rate: Stopped (23)  Pharmacy to Dose Cefepime,   Pharmacy to dose vancomycin,   sodium bicarbonate, 75 mEq, Last Rate: 75 mEq (23)      PRN Meds:.  acetaminophen    aluminum-magnesium hydroxide-simethicone    senna-docusate sodium **AND** polyethylene glycol **AND** bisacodyl **AND** bisacodyl    fluticasone    nitroglycerin    ondansetron    Pharmacy to Dose Cefepime    Pharmacy to dose vancomycin    sodium chloride    sodium chloride    [COMPLETED] Insert Peripheral IV **AND** sodium chloride    sodium chloride    sodium chloride       Review of Systems  Constitutional:        Weakness tiredness fatigue  Eyes:                       No blurry vision, eye discharge, eye irritation, eye pain  HEENT:                   No acute hair loss, earache and discharge, nasal congestion or discharge, sore throat, postnasal drip  Respiratory:           No shortness of breath coughing sputum production wheezing hemoptysis pleuritic chest pain  Cardiovascular:     No chest pain, orthopnea, PND, dizziness,  palpitation, lower extremity edema  Gastrointestinal:   No nausea vomiting diarrhea abdominal pain constipation  Genitourinary:       No urinary incontinence, hesitancy, frequency, urgency, dysuria  Hematologic:         No bruising, bleeding, pallor, lymphadenopathy  Endocrine:            No coldness, hot flashes, polyuria, abnormal hair growth  Musculoskeletal:    No body pains, aches, arthritic pains, muscle pain ,muscle wasting  Psychiatric:          No low or high mood, anxiety, hallucinations, delusions  Skin.                      No rash, ulcers, bruising, itching  Neurological:        No confusion, headache, focal weakness, numbness, dysphasia    Objective   Objective     Vitals:   Temp:  [99 °F (37.2 °C)-100 °F (37.8 °C)] 99.2 °F (37.3 °C)  Heart Rate:  [] 105  Resp:  [20] 20  BP: ()/(45-79) 98/45  Physical Exam    Constitutional: Awake, alert responsive, conversant, no obvious distress              Psychiatric:  Appropriate affect, cooperative   Neurologic:  Awake alert ,oriented x 3, strength symmetric in all extremities, Cranial Nerves grossly intact to confrontation, speech clear   Eyes:   PERRLA, sclerae anicteric, no conjunctival injection   HEENT:  Moist mucous membranes, no nasal or eye discharge, no throat congestion   Neck:   Supple, no thyromegaly, no lymphadenopathy, trachea midline, no elevated JVD   Respiratory:  Clear to auscultation bilaterally, nonlabored respirations    Cardiovascular: RRR, no murmurs, rubs, or gallops, palpable pedal pulses bilaterally, No bilateral ankle edema   Gastrointestinal: Positive bowel sounds, soft, nontender, nondistended, no organomegaly   Musculoskeletal:  No clubbing or cyanosis to extremities,muscle wasting, joint swelling, muscle weakness             Skin:                      No rashes, bruising, skin ulcers, petechiae or ecchymosis    Result Review    Result Review:  I have personally reviewed the results from the time of this admission to  11/22/2023 08:35 EST and agree with these findings:  []  Laboratory  []  Microbiology  []  Radiology  []  EKG/Telemetry   []  Cardiology/Vascular   []  Pathology  []  Old records  []  Other:    Assessment & Plan   Assessment / Plan       Active Hospital Problems:  Active Hospital Problems    Diagnosis     **Sepsis     Metabolic acidosis     Myelodysplastic syndrome     Acute constipation     Gallstones     Acute upper GI bleed     Chronic anemia     Chronic kidney disease, stage IV (severe)     Urinary retention     Acute renal failure superimposed on stage 1 chronic kidney disease      82-year-old male with past medical history of neurogenic bladder with chronic Agustin in place, MDS, hypertension, diabetic neuropathy, CKD stage IV with baseline creatinine of 2-2.4, dementia recurrent anemia with recent history of coffee-ground emesis presented with abdominal discomfort with CARMEL and creatinine rise to 3.3 at the time of presentation likely due to the patient being volume down with continued worsening of his renal dysfunction likely denoting patient has developed ATN.  Course also complicated with A-fib with RVR, concern for possible cholecystitis with no intervention needed per surgery, endoscopy which was unremarkable    Plan:   Noted patient is transitioning to hospice  At this time we will sign off.  Please do not hesitate to contact if plan changes       Electronically signed by Martin Javier MD, 11/22/23, 8:35 AM EST.

## 2023-11-22 NOTE — PROGRESS NOTES
[facility]     Progress Note    Patient Name: Selvin Ferguson  : 1935  MRN: [id number]  Primary Care Physician:  Kayla Renee APRN  Date of admission: 11/15/2023    Subjective   Subjective     Chief Complaint: Patient with dementia multiple comorbidities have discussed with the family in detail and at this point the plan is to put him on comfort care because patient has been coming in and out out of hospital and there is really not much can be done for him    H patient on comfort care as per wife wishes waiting for placement to the rehab facility    Review of Systems   All systems were reviewed and negative except for: Has been reviewed    Objective   Objective     Vitals:   Temp:  [99 °F (37.2 °C)-100 °F (37.8 °C)] 99.2 °F (37.3 °C)  Heart Rate:  [] 105  Resp:  [20] 20  BP: ()/(45-79) 98/45    Physical Exam    Constitutional: Awake, alert   Eyes: PERRLA, sclerae anicteric, no conjunctival injection   HENT: NCAT, mucous membranes moist   Neck: Supple, no thyromegaly, no lymphadenopathy, trachea midline   Respiratory: Clear to auscultation bilaterally, nonlabored respirations    Cardiovascular: RRR, no murmurs, rubs, or gallops, palpable pedal pulses bilaterally   Gastrointestinal: Positive bowel sounds, soft, nontender, nondistended   Musculoskeletal: No bilateral ankle edema, no clubbing or cyanosis to extremities   Psychiatric: Appropriate affect, cooperative   Neurologic: Oriented x 3, strength symmetric in all extremities, Cranial Nerves grossly intact to confrontation, speech clear   Skin: No rashes   Use tenderness  Result Review    Result Review:  I have personally reviewed the results from the time of this admission to 2023 08:19 EST and agree with these findings:  [x]  Laboratory  []  Microbiology  [x]  Radiology  []  EKG/Telemetry   []  Cardiology/Vascular   []  Pathology  []  Old records  []  Other:  Most notable findings include: Diffuse tenderness with  anemia    Assessment & Plan   Assessment / Plan     Brief Patient Summary:  Selvin Ferguson is a 88 y.o. male patient has progressive myelodysplastic syndrome not a delay for getting any chemo    Active Hospital Problems:  Active Hospital Problems    Diagnosis     **Sepsis     Metabolic acidosis     Myelodysplastic syndrome     Acute constipation     Gallstones     Acute upper GI bleed     Chronic anemia     Chronic kidney disease, stage IV (severe)     Urinary retention     Acute renal failure superimposed on stage 1 chronic kidney disease        Plan: He is waiting for placement    DVT prophylaxis:  Mechanical DVT prophylaxis orders are present.    CODE STATUS:   Medical Intervention Limits: NO intubation (DNI); NO artificial nutrition; NO cardioversion; NO dialysis; NO NIPPV (Non-Invasive Positive Pressure Ventilation); NO vasopressors  Code Status (Patient has no pulse and is not breathing): No CPR (Do Not Attempt to Resuscitate)  Medical Interventions (Patient has pulse or is breathing): Limited Support    Disposition:  I expect patient to be discharged waiting for placement.    Electronically signed by Tristin Reece MD, 11/22/23, 8:19 AM EST.      Part of this note may be an electronic transcription/translation of spoken language to printed text using the Dragon Dictation System.

## 2023-11-22 NOTE — PROGRESS NOTES
Date of service: 11/22/2023    Subjective: Lying on bed.  Awake.  He answers by nodding his head and pointed his thumb up.  No chest pain, SOB, palpitations or dizziness.  He refused to take his medication this morning.    Review of systems: No nausea, vomiting or abdominal pain.  No fever or chills.    Physical examination: He was in no pain or distress.  Vital signs: Reviewed  HEENT: Sclerae: Nonicteric  Neck: No JVD or carotid bruit.  Chest: CTA  Cardiac: RRR with occasional irregularities.  No murmurs or S3 gallop  Abdomen: Soft and nontender.  No megalies or masses  Extremities: No edema, cyanosis or clubbing.  Pulse intact  Neuro: Alert, oriented and no facial droop.    Records: Reviewed     Assessment and plan     1.  Telemetry: Sinus rhythm with intermittent nonsustained SVT and nonsustained VT.  Discussed with his nurse to try again to give him his medication this morning.  2.  Hypertension  3.  DM  4.  Chronic renal disease stage III-IV  5.  Intermittent borderline blood pressure.  Change midodrine to 5 mg p.o. 3 times daily  6.  Continue Toprol XL 25 mg p.o. twice daily.  Change Cardizem 30 mg p.o. 3 times daily.  7.  Hypokalemia.  BMP today  8.  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  Iron deficiency anemia, status post iron transfusion  9.  Thrombocytopenia, per Dr. Reece management  10.  Cholelithiasis  11.  DNR per his RN    12.  Nothing to add I will sign off.  I tried to discuss his case with Dr. Reece but he did not answer his call.

## 2023-11-22 NOTE — PLAN OF CARE
Goal Outcome Evaluation:  Plan of Care Reviewed With: patient        Progress: no change  Outcome Evaluation: Patient alert but disoriented to time and place.BP in low 90s, Tachycardic, weak congested non productive cough .Patient in pain.,MD notified .Will continue plan of care.  Sussy Christopher RN

## 2023-11-22 NOTE — DISCHARGE SUMMARY
[facility]         DISCHARGE SUMMARY    Patient Name: Selvin Ferguson  : 1935  MRN: [id number]    Date of Admission: 11/15/2023  Date of Discharge:  2023  Primary Care Physician: Kayla Renee APRN    Consults       Date and Time Order Name Status Description    2023 11:30 AM Inpatient General Surgery Consult Completed     2023 11:39 AM Inpatient Nephrology Consult      2023  7:19 PM Inpatient Urology Consult Completed     2023 10:50 AM Inpatient Cardiology Consult      11/15/2023  7:41 PM Inpatient Gastroenterology Consult      11/15/2023  6:56 PM Inpatient General Surgery Consult Completed     11/15/2023  4:58 PM IP General Consult (Use specialty-specific consult if known)      11/15/2023  4:34 PM Gastroenterology (on-call MD unless specified) Completed     11/15/2023  4:34 PM Surgery (on-call MD unless specified)              Presenting Problem:   Acute upper GI bleed [K92.2]  Calculus of gallbladder without cholecystitis without obstruction [K80.20]  Acute UTI (urinary tract infection) [N39.0]  Sepsis [A41.9]  Constipation, unspecified constipation type [K59.00]  Acute on chronic anemia [D64.9]  Sepsis without acute organ dysfunction, due to unspecified organism [A41.9]    Active and Resolved Hospital Problems:  Active Hospital Problems    Diagnosis POA    **Sepsis [A41.9] Yes    Sepsis due to methicillin resistant Staphylococcus aureus (MRSA) without acute organ dysfunction [A41.02] Yes    Metabolic acidosis [E87.20] Unknown    Myelodysplastic syndrome [D46.9] Unknown    Acute constipation [K59.00] Unknown    Gallstones [K80.20] Unknown    Acute upper GI bleed [K92.2] Unknown    Chronic anemia [D64.9] Yes    Chronic kidney disease, stage IV (severe) [N18.4] Yes    Urinary retention [R33.9] Yes    Acute renal failure superimposed on stage 1 chronic kidney disease [N17.9, N18.1] Yes      Resolved Hospital Problems   No resolved problems to display.          Hospital Course     Hospital Course:  Selvin Ferguson is a 88 y.o. male patient was supposed to have been discharged yesterday distress and he was done however was no bed available in the nursing home and therefore were changed today to bed is available so patient is going to be discharged today is distress may was discharged yesterday and it remains the same patient had the MRSA sepsis at [facility] and stage myelodysplastic syndrome dementia and cardiac issues not a candidate for any further aggressive treatment discussed with the family decided to make him comfort care and he is therefore being discharged on of the apparent on comfort care    Comfort care  DISCHARGE Follow Up Recommendations for labs and diagnostics: heart care for in the nursing home      Pertinent  and/or Most Recent Results     LAB RESULTS:      Lab 11/21/23  0510 11/18/23  1058 11/18/23  0355 11/17/23  0410 11/16/23  0414 11/16/23  0028 11/15/23  1731   WBC 6.48  --  11.97* 16.00* 26.24* 26.88*  --    HEMOGLOBIN 7.6*  --  8.4* 9.0* 10.4* 10.4*  --    HEMATOCRIT 22.3*  --  25.8* 26.5* 31.3* 30.6*  --    PLATELETS 10*  --  36* 41* 59* 60*  --    NEUTROS ABS 5.58  --  10.68* 14.58* 24.31*  --   --    IMMATURE GRANS (ABS) 0.09*  --  0.07* 0.20* 0.21*  --   --    LYMPHS ABS 0.52*  --  0.82 0.80 1.02  --   --    MONOS ABS 0.11  --  0.20 0.30 0.54  --   --    EOS ABS 0.15  --  0.12 0.03 0.00  --   --    MCV 87.1  --  88.7 86.9 88.7 87.4  --    PROCALCITONIN  --  5.31*  --   --   --   --   --    LACTATE  --  1.5  --   --   --   --  1.5   PROTIME  --   --   --   --  18.0*  --   --          Lab 11/21/23  0510 11/20/23  0351 11/18/23  1058 11/18/23  0355 11/17/23  0410   SODIUM 149* 153* 146* 145 146*   POTASSIUM 3.2* 2.7* 3.8 4.1 3.2*   CHLORIDE 114* 119* 117* 117* 118*   CO2 21.5* 19.2* 16.7* 16.0* 18.5*   ANION GAP 13.5 14.8 12.3 12.0 9.5   BUN 45* 32* 31* 31* 24*   CREATININE 4.29* 3.65* 3.33* 3.10* 2.82*   EGFR 12.6* 15.3* 17.1* 18.6* 20.9*    GLUCOSE 261* 265* 110* 109* 145*   CALCIUM 7.7* 8.3* 8.6 8.4* 8.6   MAGNESIUM  --  2.0  --   --   --          Lab 11/21/23  0510 11/20/23  0351 11/19/23  1139 11/18/23  1058 11/17/23  0410 11/16/23  0028   TOTAL PROTEIN 6.5 7.1  --  7.5 7.0 7.8   ALBUMIN 3.0* 3.3*  --  3.4* 3.4* 4.1   GLOBULIN 3.5 3.8  --  4.1 3.6 3.7   ALT (SGPT) 57* 51*  --  35 13 12   AST (SGOT) 116* 91*  --  67* 26 25   BILIRUBIN 0.9 1.0  --  1.2 1.6* 3.2*   ALK PHOS 122* 146*  --  128* 96 97   AMYLASE  --  76  --   --   --   --    LIPASE  --   --  109*  --   --   --          Lab 11/16/23  0414   PROTIME 18.0*   INR 1.46*             Lab 11/16/23  0414   IRON 45*   IRON SATURATION (TSAT) 19*   TIBC 241*   TRANSFERRIN 162*   FERRITIN 2,778.00*   FOLATE 4.36*   VITAMIN B 12 >2,000*         Brief Urine Lab Results  (Last result in the past 365 days)        Color   Clarity   Blood   Leuk Est   Nitrite   Protein   CREAT   Urine HCG        11/15/23 1604 Yellow   Turbid   Moderate (2+)   Large (3+)   Negative   >=300 mg/dL (3+)                 Microbiology Results (last 10 days)       Procedure Component Value - Date/Time    Blood Culture - Blood, Hand, Right [[id number]]  (Normal) Collected: 11/18/23 0955    Lab Status: Preliminary result Specimen: Blood from Hand, Right Updated: 11/22/23 1015     Blood Culture No growth at 4 days    Blood Culture - Blood, Hand, Right [[id number]]  (Normal) Collected: 11/18/23 0905    Lab Status: Preliminary result Specimen: Blood from Hand, Right Updated: 11/22/23 0915     Blood Culture No growth at 4 days    Urine Culture - Urine, Urine, Clean Catch [[id number]]  (Abnormal)  (Susceptibility) Collected: 11/15/23 1604    Lab Status: Final result Specimen: Urine, Clean Catch Updated: 11/18/23 0341     Urine Culture >100,000 CFU/mL Staphylococcus aureus, MRSA     Comment:   Methicillin resistant Staph aureus, patient may be an isolation risk.  Except in cases with genitourinary instrumentation, Staphylococcus aureus  bacteriuria is associated with S. aureus bacteremia. Blood cultures are recommended.       Narrative:      Colonization of the urinary tract without infection is common. Treatment is discouraged unless the patient is symptomatic, pregnant, or undergoing an invasive urologic procedure.    Susceptibility        Staphylococcus aureus, MRSA      ESTRELLITA      Nitrofurantoin Susceptible      Oxacillin Resistant      Tetracycline Susceptible      Trimethoprim + Sulfamethoxazole Susceptible      Vancomycin Susceptible                       Susceptibility Comments       Staphylococcus aureus, MRSA    This isolate does not demonstrate inducible clindamycin resistance in vitro.                 COVID-19, FLU A/B, RSV PCR 1 HR TAT - Swab, Nasopharynx [[id number]]  (Normal) Collected: 11/15/23 1544    Lab Status: Final result Specimen: Swab from Nasopharynx Updated: 11/15/23 1622     COVID19 Not Detected     Influenza A PCR Not Detected     Influenza B PCR Not Detected     RSV, PCR Not Detected    Narrative:      Fact sheet for providers: https://www.fda.gov/media/954893/download    Fact sheet for patients: https://www.fda.gov/media/224112/download    Test performed by PCR.    Blood Culture - Blood, Arm, Left [[id number]]  (Normal) Collected: 11/15/23 1427    Lab Status: Final result Specimen: Blood from Arm, Left Updated: 11/20/23 1446     Blood Culture No growth at 5 days    Blood Culture - Blood, Arm, Right [[id number]]  (Normal) Collected: 11/15/23 1410    Lab Status: Final result Specimen: Blood from Arm, Right Updated: 11/20/23 1431     Blood Culture No growth at 5 days            US Renal Limited    Result Date: 11/19/2023  Impression:   Bilateral renal ultrasound demonstrating no evidence of hydronephrosis.  Atrophic left kidney.      DANITA BUENO MD       Electronically Signed and Approved By: DANITA BUENO MD on 11/19/2023 at 15:54             US Gallbladder    Result Date: 11/19/2023  Impression:   Right upper quadrant  ultrasound demonstrating cholelithiasis.  Thickening of the gallbladder wall is concerning for cholecystitis.     DANITA BUENO MD       Electronically Signed and Approved By: DANITA BUENO MD on 11/19/2023 at 15:51                      Results for orders placed during the hospital encounter of 09/18/23    Adult Transthoracic Echo Complete W/ Cont if Necessary Per Protocol    Interpretation Summary    Left ventricular ejection fraction appears to be 56 - 60%.    Left ventricular wall thickness is consistent with mild concentric hypertrophy.    The left atrial cavity is mildly dilated.    Moderate tricuspid valve regurgitation is present.    Estimated right ventricular systolic pressure from tricuspid regurgitation is mildly elevated (35-45 mmHg).    There were no apparent intracardiac masses, vegetations or thrombi.      Labs Pending at Discharge:  Pending Labs       Order Current Status    Blood Culture - Blood, Hand, Right Preliminary result    Blood Culture - Blood, Hand, Right Preliminary result              Discharge Details        Discharge Medications        Continue These Medications        Instructions Start Date   fexofenadine 60 MG tablet  Commonly known as: ALLEGRA   60 mg, Oral, Daily PRN      fluticasone 50 MCG/ACT nasal spray  Commonly known as: FLONASE   2 sprays, Each Nare, Daily PRN      hydrophilic ointment   1 application , Topical, 2 Times Daily PRN      lidocaine 5 %  Commonly known as: LIDODERM   1 patch, Transdermal, Daily PRN      metoprolol succinate XL 25 MG 24 hr tablet  Commonly known as: TOPROL-XL   25 mg, Oral, Daily      nitroglycerin 0.4 MG SL tablet  Commonly known as: NITROSTAT   0.4 mg, Oral, Every 5 Minutes PRN      rosuvastatin 40 MG tablet  Commonly known as: CRESTOR   40 mg, Oral, Daily      traZODone 50 MG tablet  Commonly known as: DESYREL   25 mg, Oral, Nightly             Stop These Medications      amLODIPine 10 MG tablet  Commonly known as: NORVASC     aspirin 81 MG EC  tablet     clopidogrel 75 MG tablet  Commonly known as: PLAVIX     Darbepoetin New 40 MCG/0.4ML solution prefilled syringe     lisinopril 10 MG tablet  Commonly known as: PRINIVIL,ZESTRIL              No Known Allergies      Discharge Disposition:  Skilled Nursing Facility (DC - External)    Diet:  Hospital:  Diet Order   Procedures    Diet: Regular/House Diet; Texture: Mechanical Ground (NDD 2); Fluid Consistency: Thin (IDDSI 0)         Discharge Activity:   Activity Instructions    Q 2hr turn. Bedrest.             CODE STATUS:  Code Status and Medical Interventions:   Ordered at: 11/21/23 0813     Medical Intervention Limits:    NO intubation (DNI)    NO artificial nutrition    NO cardioversion    NO dialysis    NO NIPPV (Non-Invasive Positive Pressure Ventilation)    NO vasopressors     Code Status (Patient has no pulse and is not breathing):    No CPR (Do Not Attempt to Resuscitate)     Medical Interventions (Patient has pulse or is breathing):    Limited Support         Future Appointments   Date Time Provider Department Center   1/23/2024  1:30 PM Rosy Palomino APRN [facility]           Time spent on Discharge including face to face service:  45 minutes    Electronically signed by Tristin Reece MD, 11/22/23, 5:16 PM EST.    Part of this note may be an electronic transcription/translation of spoken language to printed text using the Dragon Dictation System.

## 2023-11-22 NOTE — SIGNIFICANT NOTE
11/22/23 1256   Plan   Plan Patient was approved at [facility] marcia Blackmon. Notified MD patient can DC to the facility today.   Final Discharge Disposition Code 03 - skilled nursing facility (SNF)

## 2023-11-23 LAB
BACTERIA SPEC AEROBE CULT: NORMAL
BACTERIA SPEC AEROBE CULT: NORMAL

## 2023-11-27 LAB
QT INTERVAL: 267 MS
QTC INTERVAL: 390 MS

## 2023-12-05 ENCOUNTER — TELEPHONE (OUTPATIENT)
Dept: GASTROENTEROLOGY | Facility: CLINIC | Age: 88
End: 2023-12-05

## 2023-12-05 NOTE — TELEPHONE ENCOUNTER
“Please be informed that patient has passed. Patient has been marked  in the system. The date of death is: 2023.    Caller: HOMER WITH [facility]    HOMER WITH [facility] CALLED IN AND STATED THAT SHE NEEDED TO CANCEL PATIENT'S APPOINTMENTS DUE TO PATIENT BEING . DATE OF DEATH 2023.

## (undated) DEVICE — PAD GRND REM POLYHESIVE A/ DISP

## (undated) DEVICE — FIAPC® PROBE W/ FILTER 2200 A OD 2.3MM/6.9FR; L 2.2M/7.2FT: Brand: ERBE

## (undated) DEVICE — BLCK/BITE BLOX WO/DENTL/RIM W/STRAP 54F

## (undated) DEVICE — CONN JET HYDRA H20 AUXILIARY DISP

## (undated) DEVICE — SINGLE-USE BIOPSY FORCEPS: Brand: RADIAL JAW 4

## (undated) DEVICE — Device: Brand: DEFENDO AIR/WATER/SUCTION AND BIOPSY VALVE

## (undated) DEVICE — SOLIDIFIER LIQLOC PLS 1500CC BT

## (undated) DEVICE — SOL IRRG H2O PL/BG 1000ML STRL

## (undated) DEVICE — Device

## (undated) DEVICE — LINER SURG CANSTR SXN S/RIGD 1500CC